# Patient Record
Sex: MALE | Race: WHITE | Employment: PART TIME | URBAN - METROPOLITAN AREA
[De-identification: names, ages, dates, MRNs, and addresses within clinical notes are randomized per-mention and may not be internally consistent; named-entity substitution may affect disease eponyms.]

---

## 2020-01-01 ENCOUNTER — APPOINTMENT (OUTPATIENT)
Dept: GENERAL RADIOLOGY | Age: 75
DRG: 951 | End: 2020-01-01
Attending: EMERGENCY MEDICINE
Payer: COMMERCIAL

## 2020-01-01 ENCOUNTER — HOSPITAL ENCOUNTER (EMERGENCY)
Age: 75
Discharge: HOME | DRG: 951 | End: 2020-12-30
Attending: EMERGENCY MEDICINE
Payer: COMMERCIAL

## 2020-01-01 VITALS
HEART RATE: 78 BPM | OXYGEN SATURATION: 95 % | HEIGHT: 71 IN | DIASTOLIC BLOOD PRESSURE: 70 MMHG | TEMPERATURE: 98.8 F | SYSTOLIC BLOOD PRESSURE: 109 MMHG | RESPIRATION RATE: 20 BRPM | BODY MASS INDEX: 29.82 KG/M2 | WEIGHT: 213 LBS

## 2020-01-01 DIAGNOSIS — E87.1 HYPONATREMIA: ICD-10-CM

## 2020-01-01 DIAGNOSIS — J12.82 PNEUMONIA DUE TO COVID-19 VIRUS: ICD-10-CM

## 2020-01-01 DIAGNOSIS — U07.1 PNEUMONIA DUE TO COVID-19 VIRUS: ICD-10-CM

## 2020-01-01 DIAGNOSIS — U07.1 COVID-19 VIRUS INFECTION: Primary | ICD-10-CM

## 2020-01-01 LAB
ALBUMIN SERPL-MCNC: 3.8 G/DL (ref 3.2–4.6)
ALBUMIN/GLOB SERPL: 0.9 {RATIO} (ref 1.2–3.5)
ALP SERPL-CCNC: 98 U/L (ref 50–136)
ALT SERPL-CCNC: 55 U/L (ref 12–65)
ANION GAP SERPL CALC-SCNC: 3 MMOL/L (ref 7–16)
AST SERPL-CCNC: 130 U/L (ref 15–37)
ATRIAL RATE: 92 BPM
BASOPHILS # BLD: 0 K/UL (ref 0–0.2)
BASOPHILS NFR BLD: 0 % (ref 0–2)
BILIRUB SERPL-MCNC: 0.6 MG/DL (ref 0.2–1.1)
BUN SERPL-MCNC: 22 MG/DL (ref 8–23)
CALCIUM SERPL-MCNC: 8.9 MG/DL (ref 8.3–10.4)
CALCULATED P AXIS, ECG09: 23 DEGREES
CALCULATED R AXIS, ECG10: -3 DEGREES
CALCULATED T AXIS, ECG11: 15 DEGREES
CHLORIDE SERPL-SCNC: 104 MMOL/L (ref 98–107)
CO2 SERPL-SCNC: 22 MMOL/L (ref 21–32)
COVID-19 RAPID TEST, COVR: DETECTED
CREAT SERPL-MCNC: 1.7 MG/DL (ref 0.8–1.5)
DIAGNOSIS, 93000: NORMAL
DIFFERENTIAL METHOD BLD: ABNORMAL
EOSINOPHIL # BLD: 0 K/UL (ref 0–0.8)
EOSINOPHIL NFR BLD: 0 % (ref 0.5–7.8)
ERYTHROCYTE [DISTWIDTH] IN BLOOD BY AUTOMATED COUNT: 13.1 % (ref 11.9–14.6)
GLOBULIN SER CALC-MCNC: 4.1 G/DL (ref 2.3–3.5)
GLUCOSE SERPL-MCNC: 151 MG/DL (ref 65–100)
HCT VFR BLD AUTO: 39.7 % (ref 41.1–50.3)
HGB BLD-MCNC: 13.5 G/DL (ref 13.6–17.2)
IMM GRANULOCYTES # BLD AUTO: 0 K/UL (ref 0–0.5)
IMM GRANULOCYTES NFR BLD AUTO: 0 % (ref 0–5)
LACTATE SERPL-SCNC: 1.1 MMOL/L (ref 0.4–2)
LYMPHOCYTES # BLD: 1 K/UL (ref 0.5–4.6)
LYMPHOCYTES NFR BLD: 22 % (ref 13–44)
MCH RBC QN AUTO: 32.5 PG (ref 26.1–32.9)
MCHC RBC AUTO-ENTMCNC: 34 G/DL (ref 31.4–35)
MCV RBC AUTO: 95.4 FL (ref 79.6–97.8)
MONOCYTES # BLD: 0.3 K/UL (ref 0.1–1.3)
MONOCYTES NFR BLD: 7 % (ref 4–12)
NEUTS SEG # BLD: 3.2 K/UL (ref 1.7–8.2)
NEUTS SEG NFR BLD: 71 % (ref 43–78)
NRBC # BLD: 0 K/UL (ref 0–0.2)
P-R INTERVAL, ECG05: 136 MS
PLATELET # BLD AUTO: 136 K/UL (ref 150–450)
PLATELET COMMENTS,PCOM: ADEQUATE
PMV BLD AUTO: 9.7 FL (ref 9.4–12.3)
POTASSIUM SERPL-SCNC: 4.1 MMOL/L (ref 3.5–5.1)
PROCALCITONIN SERPL-MCNC: 0.07 NG/ML
PROT SERPL-MCNC: 7.9 G/DL (ref 6.3–8.2)
Q-T INTERVAL, ECG07: 332 MS
QRS DURATION, ECG06: 100 MS
QTC CALCULATION (BEZET), ECG08: 410 MS
RBC # BLD AUTO: 4.16 M/UL (ref 4.23–5.6)
RBC MORPH BLD: ABNORMAL
SODIUM SERPL-SCNC: 129 MMOL/L (ref 136–145)
SOURCE, COVRS: ABNORMAL
TROPONIN-HIGH SENSITIVITY: 19.1 PG/ML (ref 0–14)
VENTRICULAR RATE, ECG03: 92 BPM
WBC # BLD AUTO: 4.5 K/UL (ref 4.3–11.1)
WBC MORPH BLD: ABNORMAL

## 2020-01-01 PROCEDURE — 74011250636 HC RX REV CODE- 250/636: Performed by: EMERGENCY MEDICINE

## 2020-01-01 PROCEDURE — 87040 BLOOD CULTURE FOR BACTERIA: CPT

## 2020-01-01 PROCEDURE — 74011250637 HC RX REV CODE- 250/637: Performed by: EMERGENCY MEDICINE

## 2020-01-01 PROCEDURE — 84484 ASSAY OF TROPONIN QUANT: CPT

## 2020-01-01 PROCEDURE — 99284 EMERGENCY DEPT VISIT MOD MDM: CPT

## 2020-01-01 PROCEDURE — 80053 COMPREHEN METABOLIC PANEL: CPT

## 2020-01-01 PROCEDURE — 71045 X-RAY EXAM CHEST 1 VIEW: CPT

## 2020-01-01 PROCEDURE — 93005 ELECTROCARDIOGRAM TRACING: CPT | Performed by: EMERGENCY MEDICINE

## 2020-01-01 PROCEDURE — 83605 ASSAY OF LACTIC ACID: CPT

## 2020-01-01 PROCEDURE — 84145 PROCALCITONIN (PCT): CPT

## 2020-01-01 PROCEDURE — 85025 COMPLETE CBC W/AUTO DIFF WBC: CPT

## 2020-01-01 PROCEDURE — 87635 SARS-COV-2 COVID-19 AMP PRB: CPT

## 2020-01-01 RX ORDER — DOXYCYCLINE HYCLATE 100 MG
100 TABLET ORAL 2 TIMES DAILY
Qty: 14 TAB | Refills: 0 | Status: SHIPPED | OUTPATIENT
Start: 2020-01-01 | End: 2021-01-01

## 2020-01-01 RX ORDER — ACETAMINOPHEN 500 MG
1000 TABLET ORAL
Status: COMPLETED | OUTPATIENT
Start: 2020-01-01 | End: 2020-01-01

## 2020-01-01 RX ADMIN — ACETAMINOPHEN 1000 MG: 500 TABLET, FILM COATED ORAL at 01:19

## 2020-01-01 RX ADMIN — SODIUM CHLORIDE 500 ML: 900 INJECTION, SOLUTION INTRAVENOUS at 01:19

## 2020-04-27 PROBLEM — R07.89 OTHER CHEST PAIN: Status: ACTIVE | Noted: 2020-01-01

## 2020-04-27 PROBLEM — R07.2 PRECORDIAL PAIN: Status: ACTIVE | Noted: 2020-01-01

## 2020-12-30 NOTE — DISCHARGE INSTRUCTIONS
Take antibiotic as prescribed. You will need to follow-up with primary care physician in 48 hours for sodium recheck to ensure not worsening. Your sodium level was 129. Self quarantine/self isolate at home per CDC/DHEC guidelines. Schedule follow-up with primary care physician in 48 hrs. Return to emergency department if symptoms worsen or progress in any way. Take Tylenol and/or Ibuprofen as directed if you develop fever. Drink plenty of fluids (water) to remain hydrated. You should be contacted in 72 hours with results to COVID-19 testing. Obtain your own personal pulse oximeter to check oxygen saturation. If oxygen saturations <88-90%, please return immediately to Emergency Department.

## 2020-12-30 NOTE — ED PROVIDER NOTES
74-year-old male with history of CAD, diabetes presents via EMS from home with complaint of increased generalized diffuse myalgias, fatigue, nonproductive cough, fever, chills, URI symptoms since Sunday 12/28. States that he visited [facility] for [place] with family on the way home from his visit he began to have chills and body aches during the car ride home. Patient reports shortness of breath and \"diffuse burning feeling in his lungs when he breathes in\". Denies chest pain, abdominal pain, nausea, vomiting, diarrhea, sore throat, lost sense of taste or smell. Patient states that he last took Aleve around noon. Patient states that he was tested for COVID-19 around 2 weeks ago and was tested negative. 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 Denies history of tobacco use. The history is provided by the patient. No  was used. Shortness of Breath This is a new problem. The problem occurs continuously. The current episode started more than 2 days ago. The problem has not changed since onset. Associated symptoms include a fever, headaches, rhinorrhea and cough. Pertinent negatives include no coryza, no sore throat, no swollen glands, no ear pain, no neck pain, no hemoptysis, no wheezing, no PND, no orthopnea, no chest pain, no syncope, no vomiting, no abdominal pain, no rash, no leg pain and no leg swelling. He has tried nothing for the symptoms. The treatment provided no relief. Past Medical History:  
Diagnosis Date  Arthritis  CAD (coronary artery disease) CABG 1992 - no heart cath since - has not seen a cardiologist since 2008  Diabetes ([facility] [address] type 2, adverage glucose , symptomatic is unknown, unknown A1C level  Full dentures  GERD (gastroesophageal reflux disease)   
 controlled with rolaids PRN  
 Hepatitis C   
 2005  High cholesterol  Hypertension Past Surgical History:  
Procedure Laterality Date  CARDIAC SURG PROCEDURE UNLIST    
 triple bypass  HX COLONOSCOPY    
 HX KNEE ARTHROSCOPY Left   
 times 3   
 HX KNEE REPLACEMENT Bilateral   
 HX REFRACTIVE SURGERY Right Family History:  
Problem Relation Age of Onset  Diabetes Mother  Kidney Disease Mother   
     only has one kidney  Cancer Mother   
     liver  Alcohol abuse Father Social History Socioeconomic History  Marital status:  Spouse name: Not on file  Number of children: Not on file  Years of education: Not on file  Highest education level: Not on file Occupational History  Not on file Social Needs  Financial resource strain: Not on file  Food insecurity Worry: Not on file Inability: Not on file  Transportation needs Medical: Not on file Non-medical: Not on file Tobacco Use  Smoking status: Never Smoker  Smokeless tobacco: Never Used Substance and Sexual Activity  Alcohol use: Yes Alcohol/week: 4.0 standard drinks Types: 4 Cans of beer per week  Drug use: No  
 Sexual activity: Not on file Lifestyle  Physical activity Days per week: Not on file Minutes per session: Not on file  Stress: Not on file Relationships  Social connections Talks on phone: Not on file Gets together: Not on file Attends Quaker service: Not on file Active member of club or organization: Not on file Attends meetings of clubs or organizations: Not on file Relationship status: Not on file  Intimate partner violence Fear of current or ex partner: Not on file Emotionally abused: Not on file Physically abused: Not on file Forced sexual activity: Not on file Other Topics Concern  Not on file Social History Narrative  Not on file ALLERGIES: Codeine Review of Systems Constitutional: Positive for chills, fatigue and fever. HENT: Positive for congestion and rhinorrhea. Negative for ear pain, sore throat, trouble swallowing and voice change. Respiratory: Positive for cough and shortness of breath. Negative for hemoptysis and wheezing. Cardiovascular: Negative for chest pain, orthopnea, leg swelling, syncope and PND. Gastrointestinal: Negative for abdominal pain, diarrhea, nausea and vomiting. Genitourinary: Negative for dysuria and flank pain. Musculoskeletal: Positive for myalgias. Negative for gait problem and neck pain. Skin: Negative for rash. Neurological: Positive for headaches. Negative for dizziness, syncope, weakness and light-headedness. Psychiatric/Behavioral: Negative for confusion. Vitals:  
 12/29/20 2234 BP: 129/76 Pulse: 90 Resp: 20 Temp: (!) 102.7 °F (39.3 °C) SpO2: 95% Weight: 96.6 kg (213 lb) Height: 5' 10.5\" (1.791 m) Physical Exam 
Vitals signs and nursing note reviewed. Constitutional:   
   Appearance: He is well-developed. Comments: Nontoxic in appearance. HENT:  
   Head: Normocephalic. Mouth/Throat:  
   Mouth: Mucous membranes are moist.  
   Comments: Uvula midline. No tonsillar erythema or exudate noted. Eyes:  
   Extraocular Movements: Extraocular movements intact. Pupils: Pupils are equal, round, and reactive to light. Neck: Musculoskeletal: Normal range of motion. Comments: No nuchal rigidity. Cardiovascular:  
   Rate and Rhythm: Normal rate and regular rhythm. Pulses: Normal pulses. Heart sounds: Normal heart sounds. Pulmonary:  
   Effort: Pulmonary effort is normal.  
   Breath sounds: Normal breath sounds. Comments: CTAB. Abdominal:  
   General: Bowel sounds are normal.  
   Palpations: Abdomen is soft. Tenderness: There is no abdominal tenderness. Comments: Soft, nontender. Musculoskeletal: Normal range of motion. Right lower leg: No edema. Left lower leg: No edema.   
Skin: 
 General: Skin is warm. Findings: No erythema. Neurological:  
   General: No focal deficit present. Mental Status: He is alert and oriented to person, place, and time. Motor: No weakness. Comments: No focal deficits. No meningeal signs. MDM Number of Diagnoses or Management Options COVID-19 virus infection: new and requires workup Hyponatremia: new and requires workup Pneumonia due to COVID-19 virus: new and requires workup Diagnosis management comments: Labs unremarkable with exception of a slight hyponatremia with sodium of 129. Chest x-ray with hazy bilateral lung opacities. COVID-19 positive. Patient well-appearing in no acute distress. Ambulated in place for greater than 3 minutes with no significant desat. O2 sats remained around 95%. Patient given small fluid bolus and Tylenol. No indication for admission the patient at this time. Will discharge home with instructions to follow-up with PCP in 48 hours. Patient given personal pulse oximeter. Patient given strict return precautions. Amount and/or Complexity of Data Reviewed Clinical lab tests: ordered and reviewed Tests in the radiology section of CPT®: ordered and reviewed Tests in the medicine section of CPT®: ordered and reviewed Review and summarize past medical records: yes Independent visualization of images, tracings, or specimens: yes Risk of Complications, Morbidity, and/or Mortality Presenting problems: moderate Diagnostic procedures: low Management options: moderate Patient Progress Patient progress: stable ED Course as of Dec 30 0315 Wed Dec 30, 2020  
0059 CXR IMPRESSION: Hazy bilateral lung opacities, possibly early pneumonia. Covid 19 
pneumonia is a differential consideration. [DF] 0225 Patient ambulated in room for greater than 3 minutes with no desaturation. O2 sats remained greater than 95%. [DF] ED Course User Index 
[DF] Ashu Bailey MD  
 EKG 
 
Date/Time: 12/30/2020 1:12 AM 
Performed by: Ashu Bailey MD 
Authorized by: Ashu Bailey MD  
 
ECG reviewed by ED Physician in the absence of a cardiologist: yes Rate:  
  ECG rate:  92 ECG rate assessment: normal   
Rhythm:  
  Rhythm: sinus rhythm Ectopy:  
  Ectopy: none QRS:  
  QRS axis:  Normal 
  QRS intervals:  Normal 
Conduction:  
  Conduction: normal   
ST segments: ST segments:  Normal 
T waves:  
  T waves: normal   
 
 
 
Results Include: 
 
Recent Results (from the past 24 hour(s)) CBC WITH AUTOMATED DIFF Collection Time: 12/29/20 10:42 PM  
Result Value Ref Range WBC 4.5 4.3 - 11.1 K/uL  
 RBC 4.16 (L) 4.23 - 5.6 M/uL  
 HGB 13.5 (L) 13.6 - 17.2 g/dL HCT 39.7 (L) 41.1 - 50.3 % MCV 95.4 79.6 - 97.8 FL  
 MCH 32.5 26.1 - 32.9 PG  
 MCHC 34.0 31.4 - 35.0 g/dL  
 RDW 13.1 11.9 - 14.6 % PLATELET 856 (L) 660 - 450 K/uL MPV 9.7 9.4 - 12.3 FL ABSOLUTE NRBC 0.00 0.0 - 0.2 K/uL DF PENDING   
METABOLIC PANEL, COMPREHENSIVE Collection Time: 12/29/20 10:42 PM  
Result Value Ref Range Sodium 129 (L) 136 - 145 mmol/L Potassium 4.1 3.5 - 5.1 mmol/L Chloride 104 98 - 107 mmol/L  
 CO2 22 21 - 32 mmol/L Anion gap 3 (L) 7 - 16 mmol/L Glucose 151 (H) 65 - 100 mg/dL BUN 22 8 - 23 MG/DL Creatinine 1.70 (H) 0.8 - 1.5 MG/DL  
 GFR est AA 51 (L) >60 ml/min/1.73m2 GFR est non-AA 42 (L) >60 ml/min/1.73m2 Calcium 8.9 8.3 - 10.4 MG/DL Bilirubin, total 0.6 0.2 - 1.1 MG/DL  
 ALT (SGPT) 55 12 - 65 U/L  
 AST (SGOT) 130 (H) 15 - 37 U/L Alk. phosphatase 98 50 - 136 U/L Protein, total 7.9 6.3 - 8.2 g/dL Albumin 3.8 3.2 - 4.6 g/dL Globulin 4.1 (H) 2.3 - 3.5 g/dL A-G Ratio 0.9 (L) 1.2 - 3.5 LACTIC ACID Collection Time: 12/29/20 10:42 PM  
Result Value Ref Range  Lactic acid 1.1 0.4 - 2.0 MMOL/L  
 
 
 
 
 
 Wan Ford MD; 12/30/2020 @1:12 AM Voice dictation software was used during the making of this note. This software is not perfect and grammatical and other typographical errors may be present.   This note has not been proofread for errors. 
===================================================================

## 2020-12-30 NOTE — ED TRIAGE NOTES
Pt arrives via [facility] from home. Pt is wearing a mask. Pt c/o generalized body aches, fatigue, cough, fever, and dizziness since Sunday 12/28/20. Pt reports he got a temperature of 101 at home. Pt reports SOB and reports a \"burning feeling when I breathe in.\" Pt reports \"extreme HA\" and diarrhea. Pt denies CP, abdominal pain, n/v, sore throat, or loss of taste/smell. Pt reports he last took a 12-hour Aleve around noon today. Pt denies any known COVID contacts. Pt reports getting together with family on Sunday. EMS started 18 g IV in left hand. Pt has Hx of CABG and Type 2 DM. 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

## 2021-01-01 ENCOUNTER — APPOINTMENT (OUTPATIENT)
Dept: CT IMAGING | Age: 76
DRG: 951 | End: 2021-01-01
Attending: HOSPITALIST
Payer: COMMERCIAL

## 2021-01-01 ENCOUNTER — APPOINTMENT (OUTPATIENT)
Dept: GENERAL RADIOLOGY | Age: 76
DRG: 951 | End: 2021-01-01
Attending: FAMILY MEDICINE
Payer: COMMERCIAL

## 2021-01-01 ENCOUNTER — APPOINTMENT (OUTPATIENT)
Dept: GENERAL RADIOLOGY | Age: 76
DRG: 951 | End: 2021-01-01
Attending: INTERNAL MEDICINE
Payer: COMMERCIAL

## 2021-01-01 ENCOUNTER — HOSPITAL ENCOUNTER (INPATIENT)
Age: 76
LOS: 12 days | DRG: 951 | End: 2021-01-13
Attending: EMERGENCY MEDICINE | Admitting: FAMILY MEDICINE
Payer: COMMERCIAL

## 2021-01-01 ENCOUNTER — APPOINTMENT (OUTPATIENT)
Dept: GENERAL RADIOLOGY | Age: 76
DRG: 951 | End: 2021-01-01
Attending: EMERGENCY MEDICINE
Payer: COMMERCIAL

## 2021-01-01 VITALS
DIASTOLIC BLOOD PRESSURE: 75 MMHG | OXYGEN SATURATION: 27 % | HEIGHT: 70 IN | TEMPERATURE: 98 F | RESPIRATION RATE: 110 BRPM | WEIGHT: 141.54 LBS | BODY MASS INDEX: 20.26 KG/M2 | SYSTOLIC BLOOD PRESSURE: 145 MMHG

## 2021-01-01 DIAGNOSIS — E11.65 TYPE 2 DIABETES MELLITUS WITH HYPERGLYCEMIA, WITH LONG-TERM CURRENT USE OF INSULIN (HCC): ICD-10-CM

## 2021-01-01 DIAGNOSIS — J12.82 PNEUMONIA DUE TO COVID-19 VIRUS: Primary | ICD-10-CM

## 2021-01-01 DIAGNOSIS — Z79.4 TYPE 2 DIABETES MELLITUS WITH HYPERGLYCEMIA, WITH LONG-TERM CURRENT USE OF INSULIN (HCC): ICD-10-CM

## 2021-01-01 DIAGNOSIS — I95.9 HYPOTENSION, UNSPECIFIED HYPOTENSION TYPE: ICD-10-CM

## 2021-01-01 DIAGNOSIS — I10 ESSENTIAL HYPERTENSION: ICD-10-CM

## 2021-01-01 DIAGNOSIS — E55.9 VITAMIN D DEFICIENCY: ICD-10-CM

## 2021-01-01 DIAGNOSIS — J96.01 ACUTE RESPIRATORY FAILURE WITH HYPOXIA (HCC): ICD-10-CM

## 2021-01-01 DIAGNOSIS — U07.1 PNEUMONIA DUE TO COVID-19 VIRUS: Primary | ICD-10-CM

## 2021-01-01 DIAGNOSIS — J93.9 PNEUMOTHORAX ON RIGHT: ICD-10-CM

## 2021-01-01 DIAGNOSIS — D72.829 LEUKOCYTOSIS, UNSPECIFIED TYPE: ICD-10-CM

## 2021-01-01 DIAGNOSIS — R09.02 HYPOXIA: ICD-10-CM

## 2021-01-01 DIAGNOSIS — N18.31 STAGE 3A CHRONIC KIDNEY DISEASE (HCC): ICD-10-CM

## 2021-01-01 PROBLEM — E78.2 MIXED HYPERLIPIDEMIA: Status: ACTIVE | Noted: 2021-01-01

## 2021-01-01 PROBLEM — E11.9 TYPE 2 DIABETES MELLITUS (HCC): Status: ACTIVE | Noted: 2018-07-23

## 2021-01-01 PROBLEM — I25.119 CORONARY ARTERY DISEASE INVOLVING NATIVE CORONARY ARTERY OF NATIVE HEART WITH ANGINA PECTORIS (HCC): Status: ACTIVE | Noted: 2020-01-01

## 2021-01-01 PROBLEM — K21.9 GASTROESOPHAGEAL REFLUX DISEASE: Status: ACTIVE | Noted: 2021-01-01

## 2021-01-01 LAB
ABO + RH BLD: NORMAL
ADMINISTERED INITIALS, ADMINIT: NORMAL
ALBUMIN SERPL-MCNC: 2.4 G/DL (ref 3.2–4.6)
ALBUMIN SERPL-MCNC: 2.6 G/DL (ref 3.2–4.6)
ALBUMIN SERPL-MCNC: 2.7 G/DL (ref 3.2–4.6)
ALBUMIN SERPL-MCNC: 2.9 G/DL (ref 3.2–4.6)
ALBUMIN SERPL-MCNC: 3.1 G/DL (ref 3.2–4.6)
ALBUMIN/GLOB SERPL: 0.6 {RATIO} (ref 1.2–3.5)
ALBUMIN/GLOB SERPL: 0.6 {RATIO} (ref 1.2–3.5)
ALBUMIN/GLOB SERPL: 0.7 {RATIO} (ref 1.2–3.5)
ALBUMIN/GLOB SERPL: 0.8 {RATIO} (ref 1.2–3.5)
ALP SERPL-CCNC: 125 U/L (ref 50–136)
ALP SERPL-CCNC: 80 U/L (ref 50–136)
ALP SERPL-CCNC: 81 U/L (ref 50–136)
ALP SERPL-CCNC: 83 U/L (ref 50–136)
ALP SERPL-CCNC: 85 U/L (ref 50–136)
ALP SERPL-CCNC: 88 U/L (ref 50–136)
ALP SERPL-CCNC: 92 U/L (ref 50–136)
ALP SERPL-CCNC: 98 U/L (ref 50–136)
ALT SERPL-CCNC: 40 U/L (ref 12–65)
ALT SERPL-CCNC: 41 U/L (ref 12–65)
ALT SERPL-CCNC: 43 U/L (ref 12–65)
ALT SERPL-CCNC: 43 U/L (ref 12–65)
ALT SERPL-CCNC: 44 U/L (ref 12–65)
ALT SERPL-CCNC: 47 U/L (ref 12–65)
ANION GAP SERPL CALC-SCNC: 10 MMOL/L (ref 7–16)
ANION GAP SERPL CALC-SCNC: 11 MMOL/L (ref 7–16)
ANION GAP SERPL CALC-SCNC: 12 MMOL/L (ref 7–16)
ANION GAP SERPL CALC-SCNC: 12 MMOL/L (ref 7–16)
ANION GAP SERPL CALC-SCNC: 6 MMOL/L (ref 7–16)
ANION GAP SERPL CALC-SCNC: 7 MMOL/L (ref 7–16)
ANION GAP SERPL CALC-SCNC: 7 MMOL/L (ref 7–16)
ANION GAP SERPL CALC-SCNC: 8 MMOL/L (ref 7–16)
ANION GAP SERPL CALC-SCNC: 9 MMOL/L (ref 7–16)
ANION GAP SERPL CALC-SCNC: 9 MMOL/L (ref 7–16)
ARTERIAL PATENCY WRIST A: ABNORMAL
ARTERIAL PATENCY WRIST A: YES
AST SERPL-CCNC: 105 U/L (ref 15–37)
AST SERPL-CCNC: 108 U/L (ref 15–37)
AST SERPL-CCNC: 109 U/L (ref 15–37)
AST SERPL-CCNC: 111 U/L (ref 15–37)
AST SERPL-CCNC: 116 U/L (ref 15–37)
AST SERPL-CCNC: 116 U/L (ref 15–37)
AST SERPL-CCNC: 117 U/L (ref 15–37)
AST SERPL-CCNC: 120 U/L (ref 15–37)
ATRIAL RATE: 79 BPM
ATRIAL RATE: 88 BPM
BACTERIA SPEC CULT: NORMAL
BASE DEFICIT BLD-SCNC: 11 MMOL/L
BASE DEFICIT BLD-SCNC: 3 MMOL/L
BASE DEFICIT BLD-SCNC: 4 MMOL/L
BASE DEFICIT BLD-SCNC: 4 MMOL/L
BASE DEFICIT BLD-SCNC: 5 MMOL/L
BASE DEFICIT BLD-SCNC: 7 MMOL/L
BASE DEFICIT BLD-SCNC: 7 MMOL/L
BASE EXCESS BLD CALC-SCNC: 1 MMOL/L
BASOPHILS # BLD: 0 K/UL (ref 0–0.2)
BASOPHILS # BLD: 0.1 K/UL (ref 0–0.2)
BASOPHILS NFR BLD: 0 % (ref 0–2)
BASOPHILS NFR BLD: 1 % (ref 0–2)
BDY SITE: ABNORMAL
BILIRUB SERPL-MCNC: 0.5 MG/DL (ref 0.2–1.1)
BILIRUB SERPL-MCNC: 0.6 MG/DL (ref 0.2–1.1)
BILIRUB SERPL-MCNC: 0.6 MG/DL (ref 0.2–1.1)
BILIRUB SERPL-MCNC: 0.7 MG/DL (ref 0.2–1.1)
BILIRUB SERPL-MCNC: 0.8 MG/DL (ref 0.2–1.1)
BILIRUB SERPL-MCNC: 0.8 MG/DL (ref 0.2–1.1)
BILIRUB SERPL-MCNC: 0.9 MG/DL (ref 0.2–1.1)
BILIRUB SERPL-MCNC: 0.9 MG/DL (ref 0.2–1.1)
BLD PROD TYP BPU: NORMAL
BLOOD BANK CMNT PATIENT-IMP: NORMAL
BLOOD GROUP ANTIBODIES SERPL: NORMAL
BNP SERPL-MCNC: 378 PG/ML (ref 5–125)
BPU ID: NORMAL
BUN SERPL-MCNC: 25 MG/DL (ref 8–23)
BUN SERPL-MCNC: 33 MG/DL (ref 8–23)
BUN SERPL-MCNC: 35 MG/DL (ref 8–23)
BUN SERPL-MCNC: 37 MG/DL (ref 8–23)
BUN SERPL-MCNC: 38 MG/DL (ref 8–23)
BUN SERPL-MCNC: 60 MG/DL (ref 8–23)
BUN SERPL-MCNC: 64 MG/DL (ref 8–23)
BUN SERPL-MCNC: 84 MG/DL (ref 8–23)
BUN SERPL-MCNC: 92 MG/DL (ref 8–23)
BUN SERPL-MCNC: 94 MG/DL (ref 8–23)
CALCIUM SERPL-MCNC: 10.1 MG/DL (ref 8.3–10.4)
CALCIUM SERPL-MCNC: 7.9 MG/DL (ref 8.3–10.4)
CALCIUM SERPL-MCNC: 8.2 MG/DL (ref 8.3–10.4)
CALCIUM SERPL-MCNC: 8.2 MG/DL (ref 8.3–10.4)
CALCIUM SERPL-MCNC: 8.3 MG/DL (ref 8.3–10.4)
CALCIUM SERPL-MCNC: 8.4 MG/DL (ref 8.3–10.4)
CALCIUM SERPL-MCNC: 8.6 MG/DL (ref 8.3–10.4)
CALCIUM SERPL-MCNC: 8.7 MG/DL (ref 8.3–10.4)
CALCIUM SERPL-MCNC: 8.7 MG/DL (ref 8.3–10.4)
CALCIUM SERPL-MCNC: 8.8 MG/DL (ref 8.3–10.4)
CALCIUM SERPL-MCNC: 9 MG/DL (ref 8.3–10.4)
CALCIUM SERPL-MCNC: 9.6 MG/DL (ref 8.3–10.4)
CALCULATED P AXIS, ECG09: 1 DEGREES
CALCULATED R AXIS, ECG10: -16 DEGREES
CALCULATED R AXIS, ECG10: -26 DEGREES
CALCULATED T AXIS, ECG11: -2 DEGREES
CALCULATED T AXIS, ECG11: -5 DEGREES
CHLORIDE SERPL-SCNC: 100 MMOL/L (ref 98–107)
CHLORIDE SERPL-SCNC: 100 MMOL/L (ref 98–107)
CHLORIDE SERPL-SCNC: 101 MMOL/L (ref 98–107)
CHLORIDE SERPL-SCNC: 104 MMOL/L (ref 98–107)
CHLORIDE SERPL-SCNC: 104 MMOL/L (ref 98–107)
CHLORIDE SERPL-SCNC: 105 MMOL/L (ref 98–107)
CHLORIDE SERPL-SCNC: 105 MMOL/L (ref 98–107)
CHLORIDE SERPL-SCNC: 106 MMOL/L (ref 98–107)
CHLORIDE SERPL-SCNC: 107 MMOL/L (ref 98–107)
CHLORIDE SERPL-SCNC: 107 MMOL/L (ref 98–107)
CHLORIDE SERPL-SCNC: 96 MMOL/L (ref 98–107)
CHLORIDE SERPL-SCNC: 96 MMOL/L (ref 98–107)
CO2 BLD-SCNC: 19 MMOL/L
CO2 BLD-SCNC: 21 MMOL/L
CO2 BLD-SCNC: 21 MMOL/L
CO2 BLD-SCNC: 24 MMOL/L
CO2 BLD-SCNC: 24 MMOL/L
CO2 BLD-SCNC: 25 MMOL/L
CO2 BLD-SCNC: 25 MMOL/L
CO2 BLD-SCNC: 26 MMOL/L
CO2 SERPL-SCNC: 19 MMOL/L (ref 21–32)
CO2 SERPL-SCNC: 20 MMOL/L (ref 21–32)
CO2 SERPL-SCNC: 21 MMOL/L (ref 21–32)
CO2 SERPL-SCNC: 21 MMOL/L (ref 21–32)
CO2 SERPL-SCNC: 22 MMOL/L (ref 21–32)
CO2 SERPL-SCNC: 23 MMOL/L (ref 21–32)
CO2 SERPL-SCNC: 23 MMOL/L (ref 21–32)
CO2 SERPL-SCNC: 24 MMOL/L (ref 21–32)
CO2 SERPL-SCNC: 27 MMOL/L (ref 21–32)
COLLECT TIME,HTIME: 1120
COLLECT TIME,HTIME: 1345
COLLECT TIME,HTIME: 1506
COLLECT TIME,HTIME: 1833
COLLECT TIME,HTIME: 2002
COLLECT TIME,HTIME: 230
COLLECT TIME,HTIME: 455
COLLECT TIME,HTIME: 635
CREAT SERPL-MCNC: 1.22 MG/DL (ref 0.8–1.5)
CREAT SERPL-MCNC: 1.27 MG/DL (ref 0.8–1.5)
CREAT SERPL-MCNC: 1.33 MG/DL (ref 0.8–1.5)
CREAT SERPL-MCNC: 1.4 MG/DL (ref 0.8–1.5)
CREAT SERPL-MCNC: 1.41 MG/DL (ref 0.8–1.5)
CREAT SERPL-MCNC: 1.51 MG/DL (ref 0.8–1.5)
CREAT SERPL-MCNC: 1.51 MG/DL (ref 0.8–1.5)
CREAT SERPL-MCNC: 1.53 MG/DL (ref 0.8–1.5)
CREAT SERPL-MCNC: 1.56 MG/DL (ref 0.8–1.5)
CREAT SERPL-MCNC: 2.6 MG/DL (ref 0.8–1.5)
CREAT SERPL-MCNC: 3.62 MG/DL (ref 0.8–1.5)
CREAT SERPL-MCNC: 3.9 MG/DL (ref 0.8–1.5)
CRP SERPL-MCNC: 10.5 MG/DL (ref 0–0.9)
D DIMER PPP FEU-MCNC: 0.4 UG/ML(FEU)
D DIMER PPP FEU-MCNC: 0.42 UG/ML(FEU)
D DIMER PPP FEU-MCNC: 0.5 UG/ML(FEU)
D DIMER PPP FEU-MCNC: 0.5 UG/ML(FEU)
D DIMER PPP FEU-MCNC: 0.51 UG/ML(FEU)
D DIMER PPP FEU-MCNC: 0.51 UG/ML(FEU)
D DIMER PPP FEU-MCNC: 0.52 UG/ML(FEU)
D DIMER PPP FEU-MCNC: 0.62 UG/ML(FEU)
D DIMER PPP FEU-MCNC: 0.73 UG/ML(FEU)
D DIMER PPP FEU-MCNC: 0.74 UG/ML(FEU)
D DIMER PPP FEU-MCNC: 0.76 UG/ML(FEU)
D50 ADMINISTERED, D50ADM: 0 ML
D50 ORDER, D50ORD: 0 ML
DIAGNOSIS, 93000: NORMAL
DIAGNOSIS, 93000: NORMAL
DIFFERENTIAL METHOD BLD: ABNORMAL
EOSINOPHIL # BLD: 0 K/UL (ref 0–0.8)
EOSINOPHIL NFR BLD: 0 % (ref 0.5–7.8)
ERYTHROCYTE [DISTWIDTH] IN BLOOD BY AUTOMATED COUNT: 12.8 % (ref 11.9–14.6)
ERYTHROCYTE [DISTWIDTH] IN BLOOD BY AUTOMATED COUNT: 13.1 % (ref 11.9–14.6)
ERYTHROCYTE [DISTWIDTH] IN BLOOD BY AUTOMATED COUNT: 13.2 % (ref 11.9–14.6)
EST. AVERAGE GLUCOSE BLD GHB EST-MCNC: 235 MG/DL
EST. AVERAGE GLUCOSE BLD GHB EST-MCNC: 240 MG/DL
EXHALED MINUTE VOLUME, VE: 11.7 L/MIN
EXHALED MINUTE VOLUME, VE: 12.6 L/MIN
EXHALED MINUTE VOLUME, VE: 15 L/MIN
EXHALED MINUTE VOLUME, VE: 20 L/MIN
FERRITIN SERPL-MCNC: 664 NG/ML (ref 8–388)
FIBRINOGEN PPP-MCNC: 555 MG/DL (ref 190–501)
FIBRINOGEN PPP-MCNC: 608 MG/DL (ref 190–501)
FIBRINOGEN PPP-MCNC: 621 MG/DL (ref 190–501)
FIBRINOGEN PPP-MCNC: 642 MG/DL (ref 190–501)
FIBRINOGEN PPP-MCNC: 644 MG/DL (ref 190–501)
FIBRINOGEN PPP-MCNC: 652 MG/DL (ref 190–501)
FIBRINOGEN PPP-MCNC: 652 MG/DL (ref 190–501)
FIBRINOGEN PPP-MCNC: 716 MG/DL (ref 190–501)
FIBRINOGEN PPP-MCNC: 757 MG/DL (ref 190–501)
FIBRINOGEN PPP-MCNC: 796 MG/DL (ref 190–501)
FIBRINOGEN PPP-MCNC: 828 MG/DL (ref 190–501)
FLOW RATE ISTAT,IFRATE: 60 L/MIN
GAS FLOW.O2 O2 DELIVERY SYS: ABNORMAL L/MIN
GAS FLOW.O2 SETTING OXYMISER: 28 BPM
GAS FLOW.O2 SETTING OXYMISER: 28 BPM
GAS FLOW.O2 SETTING OXYMISER: 32 BPM
GAS FLOW.O2 SETTING OXYMISER: 32 BPM
GLOBULIN SER CALC-MCNC: 3.7 G/DL (ref 2.3–3.5)
GLOBULIN SER CALC-MCNC: 3.8 G/DL (ref 2.3–3.5)
GLOBULIN SER CALC-MCNC: 3.9 G/DL (ref 2.3–3.5)
GLOBULIN SER CALC-MCNC: 4 G/DL (ref 2.3–3.5)
GLOBULIN SER CALC-MCNC: 4 G/DL (ref 2.3–3.5)
GLOBULIN SER CALC-MCNC: 4.2 G/DL (ref 2.3–3.5)
GLOBULIN SER CALC-MCNC: 4.2 G/DL (ref 2.3–3.5)
GLOBULIN SER CALC-MCNC: 4.9 G/DL (ref 2.3–3.5)
GLSCOM COMMENTS: NORMAL
GLUCOSE BLD STRIP.AUTO-MCNC: 129 MG/DL (ref 65–100)
GLUCOSE BLD STRIP.AUTO-MCNC: 139 MG/DL (ref 65–100)
GLUCOSE BLD STRIP.AUTO-MCNC: 146 MG/DL (ref 65–100)
GLUCOSE BLD STRIP.AUTO-MCNC: 146 MG/DL (ref 65–100)
GLUCOSE BLD STRIP.AUTO-MCNC: 179 MG/DL (ref 65–100)
GLUCOSE BLD STRIP.AUTO-MCNC: 182 MG/DL (ref 65–100)
GLUCOSE BLD STRIP.AUTO-MCNC: 189 MG/DL (ref 65–100)
GLUCOSE BLD STRIP.AUTO-MCNC: 195 MG/DL (ref 65–100)
GLUCOSE BLD STRIP.AUTO-MCNC: 202 MG/DL (ref 65–100)
GLUCOSE BLD STRIP.AUTO-MCNC: 205 MG/DL (ref 65–100)
GLUCOSE BLD STRIP.AUTO-MCNC: 209 MG/DL (ref 65–100)
GLUCOSE BLD STRIP.AUTO-MCNC: 210 MG/DL (ref 65–100)
GLUCOSE BLD STRIP.AUTO-MCNC: 219 MG/DL (ref 65–100)
GLUCOSE BLD STRIP.AUTO-MCNC: 224 MG/DL (ref 65–100)
GLUCOSE BLD STRIP.AUTO-MCNC: 226 MG/DL (ref 65–100)
GLUCOSE BLD STRIP.AUTO-MCNC: 231 MG/DL (ref 65–100)
GLUCOSE BLD STRIP.AUTO-MCNC: 232 MG/DL (ref 65–100)
GLUCOSE BLD STRIP.AUTO-MCNC: 235 MG/DL (ref 65–100)
GLUCOSE BLD STRIP.AUTO-MCNC: 243 MG/DL (ref 65–100)
GLUCOSE BLD STRIP.AUTO-MCNC: 255 MG/DL (ref 65–100)
GLUCOSE BLD STRIP.AUTO-MCNC: 256 MG/DL (ref 65–100)
GLUCOSE BLD STRIP.AUTO-MCNC: 265 MG/DL (ref 65–100)
GLUCOSE BLD STRIP.AUTO-MCNC: 274 MG/DL (ref 65–100)
GLUCOSE BLD STRIP.AUTO-MCNC: 277 MG/DL (ref 65–100)
GLUCOSE BLD STRIP.AUTO-MCNC: 279 MG/DL (ref 65–100)
GLUCOSE BLD STRIP.AUTO-MCNC: 297 MG/DL (ref 65–100)
GLUCOSE BLD STRIP.AUTO-MCNC: 299 MG/DL (ref 65–100)
GLUCOSE BLD STRIP.AUTO-MCNC: 300 MG/DL (ref 65–100)
GLUCOSE BLD STRIP.AUTO-MCNC: 314 MG/DL (ref 65–100)
GLUCOSE BLD STRIP.AUTO-MCNC: 318 MG/DL (ref 65–100)
GLUCOSE BLD STRIP.AUTO-MCNC: 320 MG/DL (ref 65–100)
GLUCOSE BLD STRIP.AUTO-MCNC: 328 MG/DL (ref 65–100)
GLUCOSE BLD STRIP.AUTO-MCNC: 334 MG/DL (ref 65–100)
GLUCOSE BLD STRIP.AUTO-MCNC: 345 MG/DL (ref 65–100)
GLUCOSE BLD STRIP.AUTO-MCNC: 346 MG/DL (ref 65–100)
GLUCOSE BLD STRIP.AUTO-MCNC: 346 MG/DL (ref 65–100)
GLUCOSE BLD STRIP.AUTO-MCNC: 351 MG/DL (ref 65–100)
GLUCOSE BLD STRIP.AUTO-MCNC: 356 MG/DL (ref 65–100)
GLUCOSE BLD STRIP.AUTO-MCNC: 364 MG/DL (ref 65–100)
GLUCOSE BLD STRIP.AUTO-MCNC: 364 MG/DL (ref 65–100)
GLUCOSE BLD STRIP.AUTO-MCNC: 385 MG/DL (ref 65–100)
GLUCOSE BLD STRIP.AUTO-MCNC: 393 MG/DL (ref 65–100)
GLUCOSE BLD STRIP.AUTO-MCNC: 396 MG/DL (ref 65–100)
GLUCOSE BLD STRIP.AUTO-MCNC: 398 MG/DL (ref 65–100)
GLUCOSE BLD STRIP.AUTO-MCNC: 408 MG/DL (ref 65–100)
GLUCOSE BLD STRIP.AUTO-MCNC: 437 MG/DL (ref 65–100)
GLUCOSE BLD STRIP.AUTO-MCNC: 451 MG/DL (ref 65–100)
GLUCOSE BLD STRIP.AUTO-MCNC: 478 MG/DL (ref 65–100)
GLUCOSE BLD STRIP.AUTO-MCNC: 514 MG/DL (ref 65–100)
GLUCOSE BLD STRIP.AUTO-MCNC: 516 MG/DL (ref 65–100)
GLUCOSE BLD STRIP.AUTO-MCNC: 84 MG/DL (ref 65–100)
GLUCOSE SERPL-MCNC: 218 MG/DL (ref 65–100)
GLUCOSE SERPL-MCNC: 235 MG/DL (ref 65–100)
GLUCOSE SERPL-MCNC: 238 MG/DL (ref 65–100)
GLUCOSE SERPL-MCNC: 241 MG/DL (ref 65–100)
GLUCOSE SERPL-MCNC: 263 MG/DL (ref 65–100)
GLUCOSE SERPL-MCNC: 280 MG/DL (ref 65–100)
GLUCOSE SERPL-MCNC: 281 MG/DL (ref 65–100)
GLUCOSE SERPL-MCNC: 299 MG/DL (ref 65–100)
GLUCOSE SERPL-MCNC: 308 MG/DL (ref 65–100)
GLUCOSE SERPL-MCNC: 345 MG/DL (ref 65–100)
GLUCOSE SERPL-MCNC: 450 MG/DL (ref 65–100)
GLUCOSE SERPL-MCNC: 531 MG/DL (ref 65–100)
GLUCOSE, GLC: 346 MG/DL
GLUCOSE, GLC: 437 MG/DL
GLUCOSE, GLC: 478 MG/DL
GLUCOSE, GLC: 514 MG/DL
GLUCOSE, GLC: 516 MG/DL
HBA1C MFR BLD: 10 % (ref 4.2–6.3)
HBA1C MFR BLD: 9.8 % (ref 4.8–6)
HCO3 BLD-SCNC: 18.1 MMOL/L (ref 22–26)
HCO3 BLD-SCNC: 19.5 MMOL/L (ref 22–26)
HCO3 BLD-SCNC: 20.2 MMOL/L (ref 22–26)
HCO3 BLD-SCNC: 21.8 MMOL/L (ref 22–26)
HCO3 BLD-SCNC: 21.9 MMOL/L (ref 22–26)
HCO3 BLD-SCNC: 23.5 MMOL/L (ref 22–26)
HCO3 BLD-SCNC: 24 MMOL/L (ref 22–26)
HCO3 BLD-SCNC: 24.3 MMOL/L (ref 22–26)
HCT VFR BLD AUTO: 32.5 % (ref 41.1–50.3)
HCT VFR BLD AUTO: 33.1 % (ref 41.1–50.3)
HCT VFR BLD AUTO: 33.3 % (ref 41.1–50.3)
HCT VFR BLD AUTO: 33.7 % (ref 41.1–50.3)
HCT VFR BLD AUTO: 34.2 % (ref 41.1–50.3)
HCT VFR BLD AUTO: 36 % (ref 41.1–50.3)
HCT VFR BLD AUTO: 38.7 % (ref 41.1–50.3)
HCT VFR BLD AUTO: 42.2 % (ref 41.1–50.3)
HCT VFR BLD AUTO: 42.4 % (ref 41.1–50.3)
HCT VFR BLD AUTO: 44.8 % (ref 41.1–50.3)
HCT VFR BLD AUTO: 46.2 % (ref 41.1–50.3)
HGB BLD-MCNC: 11.3 G/DL (ref 13.6–17.2)
HGB BLD-MCNC: 11.4 G/DL (ref 13.6–17.2)
HGB BLD-MCNC: 11.8 G/DL (ref 13.6–17.2)
HGB BLD-MCNC: 12.2 G/DL (ref 13.6–17.2)
HGB BLD-MCNC: 13.1 G/DL (ref 13.6–17.2)
HGB BLD-MCNC: 13.6 G/DL (ref 13.6–17.2)
HGB BLD-MCNC: 14.3 G/DL (ref 13.6–17.2)
HGB BLD-MCNC: 14.5 G/DL (ref 13.6–17.2)
HGB BLD-MCNC: 15.5 G/DL (ref 13.6–17.2)
HIGH TARGET, HITG: 180 MG/DL
IMM GRANULOCYTES # BLD AUTO: 0 K/UL (ref 0–0.5)
IMM GRANULOCYTES # BLD AUTO: 0 K/UL (ref 0–0.5)
IMM GRANULOCYTES # BLD AUTO: 0.1 K/UL (ref 0–0.5)
IMM GRANULOCYTES # BLD AUTO: 0.3 K/UL (ref 0–0.5)
IMM GRANULOCYTES # BLD AUTO: 0.3 K/UL (ref 0–0.5)
IMM GRANULOCYTES # BLD AUTO: 0.5 K/UL (ref 0–0.5)
IMM GRANULOCYTES # BLD AUTO: 0.9 K/UL (ref 0–0.5)
IMM GRANULOCYTES NFR BLD AUTO: 0 % (ref 0–5)
IMM GRANULOCYTES NFR BLD AUTO: 1 % (ref 0–5)
IMM GRANULOCYTES NFR BLD AUTO: 2 % (ref 0–5)
IMM GRANULOCYTES NFR BLD AUTO: 5 % (ref 0–5)
IMM GRANULOCYTES NFR BLD AUTO: 6 % (ref 0–5)
INR PPP: 1
INR PPP: 1.1
INR PPP: 1.2
INR PPP: 1.2
INSPIRATION.DURATION SETTING TIME VENT: 0.85 SEC
INSPIRATION.DURATION SETTING TIME VENT: 0.9 SEC
INSPIRATION.DURATION SETTING TIME VENT: 0.9 SEC
INSPIRATION.DURATION SETTING TIME VENT: 1 SEC
INSULIN ADMINSTERED, INSADM: 13.7 UNITS/HOUR
INSULIN ADMINSTERED, INSADM: 14.3 UNITS/HOUR
INSULIN ADMINSTERED, INSADM: 16.7 UNITS/HOUR
INSULIN ADMINSTERED, INSADM: 18.9 UNITS/HOUR
INSULIN ADMINSTERED, INSADM: 9.1 UNITS/HOUR
INSULIN ORDER, INSORD: 13.7 UNITS/HOUR
INSULIN ORDER, INSORD: 14.3 UNITS/HOUR
INSULIN ORDER, INSORD: 16.7 UNITS/HOUR
INSULIN ORDER, INSORD: 18.9 UNITS/HOUR
INSULIN ORDER, INSORD: 9.1 UNITS/HOUR
LACTATE SERPL-SCNC: 1.6 MMOL/L (ref 0.4–2)
LDH SERPL L TO P-CCNC: 286 U/L (ref 110–210)
LOW TARGET, LOT: 140 MG/DL
LYMPHOCYTES # BLD: 0.5 K/UL (ref 0.5–4.6)
LYMPHOCYTES # BLD: 0.8 K/UL (ref 0.5–4.6)
LYMPHOCYTES # BLD: 1 K/UL (ref 0.5–4.6)
LYMPHOCYTES # BLD: 1.2 K/UL (ref 0.5–4.6)
LYMPHOCYTES # BLD: 1.4 K/UL (ref 0.5–4.6)
LYMPHOCYTES NFR BLD: 11 % (ref 13–44)
LYMPHOCYTES NFR BLD: 17 % (ref 13–44)
LYMPHOCYTES NFR BLD: 18 % (ref 13–44)
LYMPHOCYTES NFR BLD: 3 % (ref 13–44)
LYMPHOCYTES NFR BLD: 7 % (ref 13–44)
LYMPHOCYTES NFR BLD: 8 % (ref 13–44)
LYMPHOCYTES NFR BLD: 9 % (ref 13–44)
MAGNESIUM SERPL-MCNC: 1.8 MG/DL (ref 1.8–2.4)
MAGNESIUM SERPL-MCNC: 2.8 MG/DL (ref 1.8–2.4)
MAGNESIUM SERPL-MCNC: 2.8 MG/DL (ref 1.8–2.4)
MCH RBC QN AUTO: 31.7 PG (ref 26.1–32.9)
MCH RBC QN AUTO: 31.7 PG (ref 26.1–32.9)
MCH RBC QN AUTO: 31.9 PG (ref 26.1–32.9)
MCH RBC QN AUTO: 32 PG (ref 26.1–32.9)
MCH RBC QN AUTO: 32 PG (ref 26.1–32.9)
MCH RBC QN AUTO: 32.1 PG (ref 26.1–32.9)
MCH RBC QN AUTO: 32.2 PG (ref 26.1–32.9)
MCH RBC QN AUTO: 32.3 PG (ref 26.1–32.9)
MCH RBC QN AUTO: 32.6 PG (ref 26.1–32.9)
MCH RBC QN AUTO: 32.7 PG (ref 26.1–32.9)
MCHC RBC AUTO-ENTMCNC: 32.1 G/DL (ref 31.4–35)
MCHC RBC AUTO-ENTMCNC: 33.5 G/DL (ref 31.4–35)
MCHC RBC AUTO-ENTMCNC: 33.5 G/DL (ref 31.4–35)
MCHC RBC AUTO-ENTMCNC: 33.9 G/DL (ref 31.4–35)
MCHC RBC AUTO-ENTMCNC: 34.4 G/DL (ref 31.4–35)
MCHC RBC AUTO-ENTMCNC: 34.5 G/DL (ref 31.4–35)
MCHC RBC AUTO-ENTMCNC: 34.8 G/DL (ref 31.4–35)
MCV RBC AUTO: 93.5 FL (ref 79.6–97.8)
MCV RBC AUTO: 93.9 FL (ref 79.6–97.8)
MCV RBC AUTO: 94.2 FL (ref 79.6–97.8)
MCV RBC AUTO: 94.5 FL (ref 79.6–97.8)
MCV RBC AUTO: 94.6 FL (ref 79.6–97.8)
MCV RBC AUTO: 94.6 FL (ref 79.6–97.8)
MCV RBC AUTO: 94.7 FL (ref 79.6–97.8)
MCV RBC AUTO: 95.3 FL (ref 79.6–97.8)
MCV RBC AUTO: 95.3 FL (ref 79.6–97.8)
MCV RBC AUTO: 98.8 FL (ref 79.6–97.8)
MINUTES UNTIL NEXT BG, NBG: 60 MIN
MONOCYTES # BLD: 0.3 K/UL (ref 0.1–1.3)
MONOCYTES # BLD: 0.4 K/UL (ref 0.1–1.3)
MONOCYTES # BLD: 0.6 K/UL (ref 0.1–1.3)
MONOCYTES # BLD: 0.6 K/UL (ref 0.1–1.3)
MONOCYTES # BLD: 0.7 K/UL (ref 0.1–1.3)
MONOCYTES # BLD: 0.9 K/UL (ref 0.1–1.3)
MONOCYTES # BLD: 1 K/UL (ref 0.1–1.3)
MONOCYTES NFR BLD: 5 % (ref 4–12)
MONOCYTES NFR BLD: 6 % (ref 4–12)
MONOCYTES NFR BLD: 6 % (ref 4–12)
MONOCYTES NFR BLD: 7 % (ref 4–12)
MONOCYTES NFR BLD: 8 % (ref 4–12)
MULTIPLIER, MUL: 0.02
MULTIPLIER, MUL: 0.03
MULTIPLIER, MUL: 0.04
MULTIPLIER, MUL: 0.05
MULTIPLIER, MUL: 0.05
NEUTS SEG # BLD: 10.9 K/UL (ref 1.7–8.2)
NEUTS SEG # BLD: 11.4 K/UL (ref 1.7–8.2)
NEUTS SEG # BLD: 18.6 K/UL (ref 1.7–8.2)
NEUTS SEG # BLD: 3.2 K/UL (ref 1.7–8.2)
NEUTS SEG # BLD: 3.7 K/UL (ref 1.7–8.2)
NEUTS SEG # BLD: 8.2 K/UL (ref 1.7–8.2)
NEUTS SEG # BLD: 9.7 K/UL (ref 1.7–8.2)
NEUTS SEG NFR BLD: 74 % (ref 43–78)
NEUTS SEG NFR BLD: 75 % (ref 43–78)
NEUTS SEG NFR BLD: 77 % (ref 43–78)
NEUTS SEG NFR BLD: 78 % (ref 43–78)
NEUTS SEG NFR BLD: 85 % (ref 43–78)
NEUTS SEG NFR BLD: 86 % (ref 43–78)
NEUTS SEG NFR BLD: 91 % (ref 43–78)
NRBC # BLD: 0 K/UL (ref 0–0.2)
NRBC # BLD: 0.02 K/UL (ref 0–0.2)
NRBC # BLD: 0.02 K/UL (ref 0–0.2)
NRBC # BLD: 0.03 K/UL (ref 0–0.2)
NRBC # BLD: 0.04 K/UL (ref 0–0.2)
O2/TOTAL GAS SETTING VFR VENT: 100 %
O2/TOTAL GAS SETTING VFR VENT: 90 %
O2/TOTAL GAS SETTING VFR VENT: 90 %
ORDER INITIALS, ORDINIT: NORMAL
P-R INTERVAL, ECG05: 126 MS
P-R INTERVAL, ECG05: 140 MS
PCO2 BLD: 27.6 MMHG (ref 35–45)
PCO2 BLD: 31.7 MMHG (ref 35–45)
PCO2 BLD: 32.6 MMHG (ref 35–45)
PCO2 BLD: 50.1 MMHG (ref 35–45)
PCO2 BLD: 52.4 MMHG (ref 35–45)
PCO2 BLD: 55.7 MMHG (ref 35–45)
PCO2 BLD: 58.4 MMHG (ref 35–45)
PCO2 BLD: 60.8 MMHG (ref 35–45)
PEEP RESPIRATORY: 12 CMH2O
PEEP RESPIRATORY: 12 CMH2O
PEEP RESPIRATORY: 15 CMH2O
PEEP RESPIRATORY: 16 CMH2O
PEEP RESPIRATORY: 16 CMH2O
PH BLD: 7.11 [PH] (ref 7.35–7.45)
PH BLD: 7.18 [PH] (ref 7.35–7.45)
PH BLD: 7.2 [PH] (ref 7.35–7.45)
PH BLD: 7.26 [PH] (ref 7.35–7.45)
PH BLD: 7.29 [PH] (ref 7.35–7.45)
PH BLD: 7.41 [PH] (ref 7.35–7.45)
PH BLD: 7.42 [PH] (ref 7.35–7.45)
PH BLD: 7.47 [PH] (ref 7.35–7.45)
PHOSPHATE SERPL-MCNC: 8.9 MG/DL (ref 2.3–3.7)
PIP ISTAT,IPIP: 19
PLATELET # BLD AUTO: 134 K/UL (ref 150–450)
PLATELET # BLD AUTO: 160 K/UL (ref 150–450)
PLATELET # BLD AUTO: 208 K/UL (ref 150–450)
PLATELET # BLD AUTO: 273 K/UL (ref 150–450)
PLATELET # BLD AUTO: 274 K/UL (ref 150–450)
PLATELET # BLD AUTO: 295 K/UL (ref 150–450)
PLATELET # BLD AUTO: 356 K/UL (ref 150–450)
PLATELET # BLD AUTO: 442 K/UL (ref 150–450)
PLATELET # BLD AUTO: 474 K/UL (ref 150–450)
PLATELET # BLD AUTO: 488 K/UL (ref 150–450)
PMV BLD AUTO: 10 FL (ref 9.4–12.3)
PMV BLD AUTO: 10 FL (ref 9.4–12.3)
PMV BLD AUTO: 10.1 FL (ref 9.4–12.3)
PMV BLD AUTO: 10.5 FL (ref 9.4–12.3)
PMV BLD AUTO: 9.6 FL (ref 9.4–12.3)
PMV BLD AUTO: 9.7 FL (ref 9.4–12.3)
PMV BLD AUTO: 9.8 FL (ref 9.4–12.3)
PMV BLD AUTO: 9.9 FL (ref 9.4–12.3)
PO2 BLD: 63 MMHG (ref 75–100)
PO2 BLD: 64 MMHG (ref 75–100)
PO2 BLD: 71 MMHG (ref 75–100)
PO2 BLD: 73 MMHG (ref 75–100)
PO2 BLD: 74 MMHG (ref 75–100)
PO2 BLD: 78 MMHG (ref 75–100)
PO2 BLD: 78 MMHG (ref 75–100)
PO2 BLD: 93 MMHG (ref 75–100)
POTASSIUM SERPL-SCNC: 4 MMOL/L (ref 3.5–5.1)
POTASSIUM SERPL-SCNC: 4.2 MMOL/L (ref 3.5–5.1)
POTASSIUM SERPL-SCNC: 4.2 MMOL/L (ref 3.5–5.1)
POTASSIUM SERPL-SCNC: 4.4 MMOL/L (ref 3.5–5.1)
POTASSIUM SERPL-SCNC: 4.5 MMOL/L (ref 3.5–5.1)
POTASSIUM SERPL-SCNC: 4.5 MMOL/L (ref 3.5–5.1)
POTASSIUM SERPL-SCNC: 4.6 MMOL/L (ref 3.5–5.1)
POTASSIUM SERPL-SCNC: 4.9 MMOL/L (ref 3.5–5.1)
POTASSIUM SERPL-SCNC: 5.3 MMOL/L (ref 3.5–5.1)
POTASSIUM SERPL-SCNC: 5.4 MMOL/L (ref 3.5–5.1)
POTASSIUM SERPL-SCNC: 5.7 MMOL/L (ref 3.5–5.1)
POTASSIUM SERPL-SCNC: 6 MMOL/L (ref 3.5–5.1)
PROCALCITONIN SERPL-MCNC: 0.1 NG/ML
PROCALCITONIN SERPL-MCNC: 0.16 NG/ML
PROT SERPL-MCNC: 6.2 G/DL (ref 6.3–8.2)
PROT SERPL-MCNC: 6.5 G/DL (ref 6.3–8.2)
PROT SERPL-MCNC: 6.8 G/DL (ref 6.3–8.2)
PROT SERPL-MCNC: 6.9 G/DL (ref 6.3–8.2)
PROT SERPL-MCNC: 6.9 G/DL (ref 6.3–8.2)
PROT SERPL-MCNC: 7.1 G/DL (ref 6.3–8.2)
PROT SERPL-MCNC: 7.1 G/DL (ref 6.3–8.2)
PROT SERPL-MCNC: 7.6 G/DL (ref 6.3–8.2)
PROTHROMBIN TIME: 13 SEC (ref 12.5–14.7)
PROTHROMBIN TIME: 13.4 SEC (ref 12.5–14.7)
PROTHROMBIN TIME: 13.4 SEC (ref 12.5–14.7)
PROTHROMBIN TIME: 13.6 SEC (ref 12.5–14.7)
PROTHROMBIN TIME: 13.8 SEC (ref 12.5–14.7)
PROTHROMBIN TIME: 13.8 SEC (ref 12.5–14.7)
PROTHROMBIN TIME: 13.9 SEC (ref 12.5–14.7)
PROTHROMBIN TIME: 14.1 SEC (ref 12.5–14.7)
PROTHROMBIN TIME: 14.2 SEC (ref 12.5–14.7)
PROTHROMBIN TIME: 14.6 SEC (ref 12.5–14.7)
PROTHROMBIN TIME: 15.3 SEC (ref 12.5–14.7)
PROTHROMBIN TIME: 15.7 SEC (ref 12.5–14.7)
Q-T INTERVAL, ECG07: 352 MS
Q-T INTERVAL, ECG07: 354 MS
QRS DURATION, ECG06: 102 MS
QRS DURATION, ECG06: 98 MS
QTC CALCULATION (BEZET), ECG08: 403 MS
QTC CALCULATION (BEZET), ECG08: 428 MS
RBC # BLD AUTO: 3.46 M/UL (ref 4.23–5.6)
RBC # BLD AUTO: 3.52 M/UL (ref 4.23–5.6)
RBC # BLD AUTO: 3.54 M/UL (ref 4.23–5.6)
RBC # BLD AUTO: 3.56 M/UL (ref 4.23–5.6)
RBC # BLD AUTO: 3.62 M/UL (ref 4.23–5.6)
RBC # BLD AUTO: 3.82 M/UL (ref 4.23–5.6)
RBC # BLD AUTO: 4.09 M/UL (ref 4.23–5.6)
RBC # BLD AUTO: 4.29 M/UL (ref 4.23–5.6)
RBC # BLD AUTO: 4.43 M/UL (ref 4.23–5.6)
RBC # BLD AUTO: 4.85 M/UL (ref 4.23–5.6)
SAO2 % BLD: 89 % (ref 95–98)
SAO2 % BLD: 90 % (ref 95–98)
SAO2 % BLD: 90 % (ref 95–98)
SAO2 % BLD: 92 % (ref 95–98)
SAO2 % BLD: 93 % (ref 95–98)
SAO2 % BLD: 94 % (ref 95–98)
SAO2 % BLD: 96 % (ref 95–98)
SAO2 % BLD: 96 % (ref 95–98)
SERVICE CMNT-IMP: ABNORMAL
SERVICE CMNT-IMP: NORMAL
SODIUM SERPL-SCNC: 128 MMOL/L (ref 138–145)
SODIUM SERPL-SCNC: 129 MMOL/L (ref 138–145)
SODIUM SERPL-SCNC: 133 MMOL/L (ref 138–145)
SODIUM SERPL-SCNC: 134 MMOL/L (ref 136–145)
SODIUM SERPL-SCNC: 134 MMOL/L (ref 136–145)
SODIUM SERPL-SCNC: 134 MMOL/L (ref 138–145)
SODIUM SERPL-SCNC: 135 MMOL/L (ref 136–145)
SODIUM SERPL-SCNC: 136 MMOL/L (ref 136–145)
SODIUM SERPL-SCNC: 136 MMOL/L (ref 138–145)
SODIUM SERPL-SCNC: 137 MMOL/L (ref 138–145)
SPECIMEN EXP DATE BLD: NORMAL
SPECIMEN TYPE: ABNORMAL
STATUS OF UNIT,%ST: NORMAL
TOTAL RESP. RATE, ITRR: 29
TROPONIN-HIGH SENSITIVITY: 10.3 PG/ML (ref 0–14)
TROPONIN-HIGH SENSITIVITY: 16.4 PG/ML (ref 0–14)
UNIT DIVISION, %UDIV: NORMAL
VENTILATION MODE VENT: ABNORMAL
VENTRICULAR RATE, ECG03: 79 BPM
VENTRICULAR RATE, ECG03: 88 BPM
VT SETTING VENT: 440 ML
VT SETTING VENT: 460 ML
VT SETTING VENT: 608 ML
WBC # BLD AUTO: 10.6 K/UL (ref 4.3–11.1)
WBC # BLD AUTO: 11.2 K/UL (ref 4.3–11.1)
WBC # BLD AUTO: 11.5 K/UL (ref 4.3–11.1)
WBC # BLD AUTO: 12.9 K/UL (ref 4.3–11.1)
WBC # BLD AUTO: 14.6 K/UL (ref 4.3–11.1)
WBC # BLD AUTO: 20.4 K/UL (ref 4.3–11.1)
WBC # BLD AUTO: 23.6 K/UL (ref 4.3–11.1)
WBC # BLD AUTO: 31.3 K/UL (ref 4.3–11.1)
WBC # BLD AUTO: 4.3 K/UL (ref 4.3–11.1)
WBC # BLD AUTO: 5 K/UL (ref 4.3–11.1)

## 2021-01-01 PROCEDURE — 74011250636 HC RX REV CODE- 250/636: Performed by: FAMILY MEDICINE

## 2021-01-01 PROCEDURE — 87186 SC STD MICRODIL/AGAR DIL: CPT

## 2021-01-01 PROCEDURE — 77030012390 HC DRN CHST BTL GTNG -B

## 2021-01-01 PROCEDURE — 77030008771 HC TU NG SALEM SUMP -A

## 2021-01-01 PROCEDURE — 97110 THERAPEUTIC EXERCISES: CPT | Performed by: PHYSICAL THERAPIST

## 2021-01-01 PROCEDURE — 74018 RADEX ABDOMEN 1 VIEW: CPT

## 2021-01-01 PROCEDURE — 85384 FIBRINOGEN ACTIVITY: CPT

## 2021-01-01 PROCEDURE — 74011000250 HC RX REV CODE- 250: Performed by: FAMILY MEDICINE

## 2021-01-01 PROCEDURE — 74011636637 HC RX REV CODE- 636/637: Performed by: FAMILY MEDICINE

## 2021-01-01 PROCEDURE — 36415 COLL VENOUS BLD VENIPUNCTURE: CPT

## 2021-01-01 PROCEDURE — 82962 GLUCOSE BLOOD TEST: CPT

## 2021-01-01 PROCEDURE — 85379 FIBRIN DEGRADATION QUANT: CPT

## 2021-01-01 PROCEDURE — 83735 ASSAY OF MAGNESIUM: CPT

## 2021-01-01 PROCEDURE — 77010033711 HC HIGH FLOW OXYGEN

## 2021-01-01 PROCEDURE — 84145 PROCALCITONIN (PCT): CPT

## 2021-01-01 PROCEDURE — 74011250636 HC RX REV CODE- 250/636: Performed by: INTERNAL MEDICINE

## 2021-01-01 PROCEDURE — 77030021668 HC NEB PREFIL KT VYRM -A

## 2021-01-01 PROCEDURE — 05H533Z INSERTION OF INFUSION DEVICE INTO RIGHT SUBCLAVIAN VEIN, PERCUTANEOUS APPROACH: ICD-10-PCS | Performed by: INTERNAL MEDICINE

## 2021-01-01 PROCEDURE — 96375 TX/PRO/DX INJ NEW DRUG ADDON: CPT

## 2021-01-01 PROCEDURE — 77030013794 HC KT TRNSDUC BLD EDWD -B

## 2021-01-01 PROCEDURE — 74011636637 HC RX REV CODE- 636/637: Performed by: HOSPITALIST

## 2021-01-01 PROCEDURE — 85025 COMPLETE CBC W/AUTO DIFF WBC: CPT

## 2021-01-01 PROCEDURE — 2709999900 HC NON-CHARGEABLE SUPPLY

## 2021-01-01 PROCEDURE — 82803 BLOOD GASES ANY COMBINATION: CPT

## 2021-01-01 PROCEDURE — 74011250637 HC RX REV CODE- 250/637: Performed by: INTERNAL MEDICINE

## 2021-01-01 PROCEDURE — 65660000000 HC RM CCU STEPDOWN

## 2021-01-01 PROCEDURE — 74011250637 HC RX REV CODE- 250/637: Performed by: FAMILY MEDICINE

## 2021-01-01 PROCEDURE — 94660 CPAP INITIATION&MGMT: CPT

## 2021-01-01 PROCEDURE — 85610 PROTHROMBIN TIME: CPT

## 2021-01-01 PROCEDURE — 77030034850

## 2021-01-01 PROCEDURE — 87040 BLOOD CULTURE FOR BACTERIA: CPT

## 2021-01-01 PROCEDURE — 94760 N-INVAS EAR/PLS OXIMETRY 1: CPT

## 2021-01-01 PROCEDURE — 84484 ASSAY OF TROPONIN QUANT: CPT

## 2021-01-01 PROCEDURE — 71045 X-RAY EXAM CHEST 1 VIEW: CPT

## 2021-01-01 PROCEDURE — 83036 HEMOGLOBIN GLYCOSYLATED A1C: CPT

## 2021-01-01 PROCEDURE — 97165 OT EVAL LOW COMPLEX 30 MIN: CPT

## 2021-01-01 PROCEDURE — 77030002996 HC SUT SLK J&J -A

## 2021-01-01 PROCEDURE — C1729 CATH, DRAINAGE: HCPCS

## 2021-01-01 PROCEDURE — 80053 COMPREHEN METABOLIC PANEL: CPT

## 2021-01-01 PROCEDURE — 87086 URINE CULTURE/COLONY COUNT: CPT

## 2021-01-01 PROCEDURE — 96374 THER/PROPH/DIAG INJ IV PUSH: CPT

## 2021-01-01 PROCEDURE — 74011000250 HC RX REV CODE- 250

## 2021-01-01 PROCEDURE — 74011000258 HC RX REV CODE- 258

## 2021-01-01 PROCEDURE — 99291 CRITICAL CARE FIRST HOUR: CPT | Performed by: INTERNAL MEDICINE

## 2021-01-01 PROCEDURE — 74011000258 HC RX REV CODE- 258: Performed by: INTERNAL MEDICINE

## 2021-01-01 PROCEDURE — 99233 SBSQ HOSP IP/OBS HIGH 50: CPT | Performed by: INTERNAL MEDICINE

## 2021-01-01 PROCEDURE — 36430 TRANSFUSION BLD/BLD COMPNT: CPT

## 2021-01-01 PROCEDURE — 74011000250 HC RX REV CODE- 250: Performed by: INTERNAL MEDICINE

## 2021-01-01 PROCEDURE — 85018 HEMOGLOBIN: CPT

## 2021-01-01 PROCEDURE — 36600 WITHDRAWAL OF ARTERIAL BLOOD: CPT

## 2021-01-01 PROCEDURE — 86901 BLOOD TYPING SEROLOGIC RH(D): CPT

## 2021-01-01 PROCEDURE — 5A1945Z RESPIRATORY VENTILATION, 24-96 CONSECUTIVE HOURS: ICD-10-PCS | Performed by: INTERNAL MEDICINE

## 2021-01-01 PROCEDURE — 0W9930Z DRAINAGE OF RIGHT PLEURAL CAVITY WITH DRAINAGE DEVICE, PERCUTANEOUS APPROACH: ICD-10-PCS | Performed by: INTERNAL MEDICINE

## 2021-01-01 PROCEDURE — 32551 INSERTION OF CHEST TUBE: CPT | Performed by: INTERNAL MEDICINE

## 2021-01-01 PROCEDURE — 97530 THERAPEUTIC ACTIVITIES: CPT

## 2021-01-01 PROCEDURE — 36556 INSERT NON-TUNNEL CV CATH: CPT | Performed by: INTERNAL MEDICINE

## 2021-01-01 PROCEDURE — 83605 ASSAY OF LACTIC ACID: CPT

## 2021-01-01 PROCEDURE — 85027 COMPLETE CBC AUTOMATED: CPT

## 2021-01-01 PROCEDURE — 97535 SELF CARE MNGMENT TRAINING: CPT

## 2021-01-01 PROCEDURE — 80048 BASIC METABOLIC PNL TOTAL CA: CPT

## 2021-01-01 PROCEDURE — 0BH17EZ INSERTION OF ENDOTRACHEAL AIRWAY INTO TRACHEA, VIA NATURAL OR ARTIFICIAL OPENING: ICD-10-PCS | Performed by: INTERNAL MEDICINE

## 2021-01-01 PROCEDURE — 83880 ASSAY OF NATRIURETIC PEPTIDE: CPT

## 2021-01-01 PROCEDURE — 87070 CULTURE OTHR SPECIMN AEROBIC: CPT

## 2021-01-01 PROCEDURE — XW033E5 INTRODUCTION OF REMDESIVIR ANTI-INFECTIVE INTO PERIPHERAL VEIN, PERCUTANEOUS APPROACH, NEW TECHNOLOGY GROUP 5: ICD-10-PCS | Performed by: FAMILY MEDICINE

## 2021-01-01 PROCEDURE — 97161 PT EVAL LOW COMPLEX 20 MIN: CPT

## 2021-01-01 PROCEDURE — 94762 N-INVAS EAR/PLS OXIMTRY CONT: CPT

## 2021-01-01 PROCEDURE — 36556 INSERT NON-TUNNEL CV CATH: CPT

## 2021-01-01 PROCEDURE — 5A09357 ASSISTANCE WITH RESPIRATORY VENTILATION, LESS THAN 24 CONSECUTIVE HOURS, CONTINUOUS POSITIVE AIRWAY PRESSURE: ICD-10-PCS | Performed by: HOSPITALIST

## 2021-01-01 PROCEDURE — 77030031476 HC EXCH HEAT MOISTW FLTR HALY -A

## 2021-01-01 PROCEDURE — XW13325 TRANSFUSION OF CONVALESCENT PLASMA (NONAUTOLOGOUS) INTO PERIPHERAL VEIN, PERCUTANEOUS APPROACH, NEW TECHNOLOGY GROUP 5: ICD-10-PCS | Performed by: EMERGENCY MEDICINE

## 2021-01-01 PROCEDURE — 87077 CULTURE AEROBIC IDENTIFY: CPT

## 2021-01-01 PROCEDURE — 74011250636 HC RX REV CODE- 250/636: Performed by: EMERGENCY MEDICINE

## 2021-01-01 PROCEDURE — 5A2204Z RESTORATION OF CARDIAC RHYTHM, SINGLE: ICD-10-PCS | Performed by: INTERNAL MEDICINE

## 2021-01-01 PROCEDURE — 94003 VENT MGMT INPAT SUBQ DAY: CPT

## 2021-01-01 PROCEDURE — 36592 COLLECT BLOOD FROM PICC: CPT

## 2021-01-01 PROCEDURE — 99232 SBSQ HOSP IP/OBS MODERATE 35: CPT | Performed by: INTERNAL MEDICINE

## 2021-01-01 PROCEDURE — 74011000636 HC RX REV CODE- 636: Performed by: FAMILY MEDICINE

## 2021-01-01 PROCEDURE — 84100 ASSAY OF PHOSPHORUS: CPT

## 2021-01-01 PROCEDURE — 99284 EMERGENCY DEPT VISIT MOD MDM: CPT

## 2021-01-01 PROCEDURE — 77030005402 HC CATH RAD ART LN KT TELE -B

## 2021-01-01 PROCEDURE — 03HY32Z INSERTION OF MONITORING DEVICE INTO UPPER ARTERY, PERCUTANEOUS APPROACH: ICD-10-PCS | Performed by: ANESTHESIOLOGY

## 2021-01-01 PROCEDURE — 74011250636 HC RX REV CODE- 250/636: Performed by: NURSE PRACTITIONER

## 2021-01-01 PROCEDURE — 32551 INSERTION OF CHEST TUBE: CPT

## 2021-01-01 PROCEDURE — 86140 C-REACTIVE PROTEIN: CPT

## 2021-01-01 PROCEDURE — 99223 1ST HOSP IP/OBS HIGH 75: CPT | Performed by: INTERNAL MEDICINE

## 2021-01-01 PROCEDURE — 77030040393 HC DRSG OPTIFOAM GENT MDII -B

## 2021-01-01 PROCEDURE — 65620000000 HC RM CCU GENERAL

## 2021-01-01 PROCEDURE — 36620 INSERTION CATHETER ARTERY: CPT

## 2021-01-01 PROCEDURE — C1751 CATH, INF, PER/CENT/MIDLINE: HCPCS

## 2021-01-01 PROCEDURE — 74011000258 HC RX REV CODE- 258: Performed by: EMERGENCY MEDICINE

## 2021-01-01 PROCEDURE — 94002 VENT MGMT INPAT INIT DAY: CPT

## 2021-01-01 PROCEDURE — 83615 LACTATE (LD) (LDH) ENZYME: CPT

## 2021-01-01 PROCEDURE — 82728 ASSAY OF FERRITIN: CPT

## 2021-01-01 PROCEDURE — 93005 ELECTROCARDIOGRAM TRACING: CPT | Performed by: HOSPITALIST

## 2021-01-01 PROCEDURE — 5A09457 ASSISTANCE WITH RESPIRATORY VENTILATION, 24-96 CONSECUTIVE HOURS, CONTINUOUS POSITIVE AIRWAY PRESSURE: ICD-10-PCS | Performed by: INTERNAL MEDICINE

## 2021-01-01 PROCEDURE — 31500 INSERT EMERGENCY AIRWAY: CPT | Performed by: INTERNAL MEDICINE

## 2021-01-01 PROCEDURE — 93005 ELECTROCARDIOGRAM TRACING: CPT | Performed by: EMERGENCY MEDICINE

## 2021-01-01 PROCEDURE — 74011636637 HC RX REV CODE- 636/637: Performed by: INTERNAL MEDICINE

## 2021-01-01 PROCEDURE — 74011000258 HC RX REV CODE- 258: Performed by: FAMILY MEDICINE

## 2021-01-01 RX ORDER — INSULIN GLARGINE 100 [IU]/ML
30 INJECTION, SOLUTION SUBCUTANEOUS
Status: DISCONTINUED | OUTPATIENT
Start: 2021-01-01 | End: 2021-01-01

## 2021-01-01 RX ORDER — ALBUMIN HUMAN 50 G/1000ML
25 SOLUTION INTRAVENOUS ONCE
Status: DISCONTINUED | OUTPATIENT
Start: 2021-01-01 | End: 2021-01-01 | Stop reason: HOSPADM

## 2021-01-01 RX ORDER — GUAIFENESIN 100 MG/5ML
81 LIQUID (ML) ORAL DAILY
Status: DISCONTINUED | OUTPATIENT
Start: 2021-01-01 | End: 2021-01-01 | Stop reason: HOSPADM

## 2021-01-01 RX ORDER — DEXTROSE 50 % IN WATER (D50W) INTRAVENOUS SYRINGE
25-50 AS NEEDED
Status: DISCONTINUED | OUTPATIENT
Start: 2021-01-01 | End: 2021-01-01 | Stop reason: HOSPADM

## 2021-01-01 RX ORDER — AMIODARONE HYDROCHLORIDE 150 MG/3ML
INJECTION, SOLUTION INTRAVENOUS
Status: COMPLETED | OUTPATIENT
Start: 2021-01-01 | End: 2021-01-01

## 2021-01-01 RX ORDER — FAMOTIDINE 40 MG/5ML
20 POWDER, FOR SUSPENSION ORAL EVERY 24 HOURS
Status: DISCONTINUED | OUTPATIENT
Start: 2021-01-01 | End: 2021-01-01 | Stop reason: HOSPADM

## 2021-01-01 RX ORDER — FENTANYL CITRATE 50 UG/ML
50 INJECTION, SOLUTION INTRAMUSCULAR; INTRAVENOUS
Status: DISCONTINUED | OUTPATIENT
Start: 2021-01-01 | End: 2021-01-01 | Stop reason: HOSPADM

## 2021-01-01 RX ORDER — ACETAMINOPHEN 325 MG/1
650 TABLET ORAL
Status: DISCONTINUED | OUTPATIENT
Start: 2021-01-01 | End: 2021-01-01 | Stop reason: HOSPADM

## 2021-01-01 RX ORDER — POLYETHYLENE GLYCOL 3350 17 G/17G
17 POWDER, FOR SOLUTION ORAL DAILY PRN
Status: DISCONTINUED | OUTPATIENT
Start: 2021-01-01 | End: 2021-01-01

## 2021-01-01 RX ORDER — ONDANSETRON 2 MG/ML
4 INJECTION INTRAMUSCULAR; INTRAVENOUS
Status: DISCONTINUED | OUTPATIENT
Start: 2021-01-01 | End: 2021-01-01 | Stop reason: HOSPADM

## 2021-01-01 RX ORDER — FENTANYL CITRATE-0.9 % NACL/PF 25 MCG/ML
0-200 PLASTIC BAG, INJECTION (ML) INJECTION
Status: DISCONTINUED | OUTPATIENT
Start: 2021-01-01 | End: 2021-01-01

## 2021-01-01 RX ORDER — INSULIN LISPRO 100 [IU]/ML
9 INJECTION, SOLUTION INTRAVENOUS; SUBCUTANEOUS
Status: DISCONTINUED | OUTPATIENT
Start: 2021-01-01 | End: 2021-01-01

## 2021-01-01 RX ORDER — INSULIN GLARGINE 100 [IU]/ML
40 INJECTION, SOLUTION SUBCUTANEOUS
Status: DISCONTINUED | OUTPATIENT
Start: 2021-01-01 | End: 2021-01-01 | Stop reason: HOSPADM

## 2021-01-01 RX ORDER — INSULIN GLARGINE 100 [IU]/ML
20 INJECTION, SOLUTION SUBCUTANEOUS
Status: DISCONTINUED | OUTPATIENT
Start: 2021-01-01 | End: 2021-01-01

## 2021-01-01 RX ORDER — DEXAMETHASONE 4 MG/1
6 TABLET ORAL DAILY
Status: DISCONTINUED | OUTPATIENT
Start: 2021-01-01 | End: 2021-01-01

## 2021-01-01 RX ORDER — SODIUM BICARBONATE 84 MG/ML
50 INJECTION, SOLUTION INTRAVENOUS ONCE
Status: COMPLETED | OUTPATIENT
Start: 2021-01-01 | End: 2021-01-01

## 2021-01-01 RX ORDER — GUAIFENESIN/DEXTROMETHORPHAN 100-10MG/5
5 SYRUP ORAL
Status: DISCONTINUED | OUTPATIENT
Start: 2021-01-01 | End: 2021-01-01 | Stop reason: HOSPADM

## 2021-01-01 RX ORDER — MELATONIN
1000 DAILY
Status: DISCONTINUED | OUTPATIENT
Start: 2021-01-01 | End: 2021-01-01

## 2021-01-01 RX ORDER — FAMOTIDINE 20 MG/1
20 TABLET, FILM COATED ORAL 2 TIMES DAILY
Status: DISCONTINUED | OUTPATIENT
Start: 2021-01-01 | End: 2021-01-01

## 2021-01-01 RX ORDER — ALBUTEROL SULFATE 90 UG/1
2 AEROSOL, METERED RESPIRATORY (INHALATION)
Status: DISCONTINUED | OUTPATIENT
Start: 2021-01-01 | End: 2021-01-01 | Stop reason: HOSPADM

## 2021-01-01 RX ORDER — INSULIN LISPRO 100 [IU]/ML
5 INJECTION, SOLUTION INTRAVENOUS; SUBCUTANEOUS ONCE
Status: COMPLETED | OUTPATIENT
Start: 2021-01-01 | End: 2021-01-01

## 2021-01-01 RX ORDER — ACETAMINOPHEN 325 MG/1
650 TABLET ORAL
Status: DISCONTINUED | OUTPATIENT
Start: 2021-01-01 | End: 2021-01-01

## 2021-01-01 RX ORDER — MIDAZOLAM IN 0.9 % SOD.CHLORID 1 MG/ML
0-10 PLASTIC BAG, INJECTION (ML) INTRAVENOUS
Status: DISCONTINUED | OUTPATIENT
Start: 2021-01-01 | End: 2021-01-01

## 2021-01-01 RX ORDER — CALCIUM CHLORIDE INJECTION 100 MG/ML
INJECTION, SOLUTION INTRAVENOUS
Status: COMPLETED | OUTPATIENT
Start: 2021-01-01 | End: 2021-01-01

## 2021-01-01 RX ORDER — FENTANYL CITRATE-0.9 % NACL/PF 25 MCG/ML
0-200 PLASTIC BAG, INJECTION (ML) INJECTION
Status: DISCONTINUED | OUTPATIENT
Start: 2021-01-01 | End: 2021-01-01 | Stop reason: HOSPADM

## 2021-01-01 RX ORDER — DEXTROSE 40 %
15 GEL (GRAM) ORAL AS NEEDED
Status: DISCONTINUED | OUTPATIENT
Start: 2021-01-01 | End: 2021-01-01 | Stop reason: HOSPADM

## 2021-01-01 RX ORDER — ASPIRIN 81 MG/1
81 TABLET ORAL
Status: DISCONTINUED | OUTPATIENT
Start: 2021-01-01 | End: 2021-01-01

## 2021-01-01 RX ORDER — ATRACURIUM BESYLATE 10 MG/ML
0.5 INJECTION, SOLUTION INTRAVENOUS ONCE
Status: COMPLETED | OUTPATIENT
Start: 2021-01-01 | End: 2021-01-01

## 2021-01-01 RX ORDER — ACETAMINOPHEN 650 MG/1
650 SUPPOSITORY RECTAL
Status: DISCONTINUED | OUTPATIENT
Start: 2021-01-01 | End: 2021-01-01

## 2021-01-01 RX ORDER — FENTANYL CITRATE 50 UG/ML
100 INJECTION, SOLUTION INTRAMUSCULAR; INTRAVENOUS ONCE
Status: COMPLETED | OUTPATIENT
Start: 2021-01-01 | End: 2021-01-01

## 2021-01-01 RX ORDER — SODIUM BICARBONATE 84 MG/ML
100 INJECTION, SOLUTION INTRAVENOUS ONCE
Status: DISCONTINUED | OUTPATIENT
Start: 2021-01-01 | End: 2021-01-01 | Stop reason: HOSPADM

## 2021-01-01 RX ORDER — ENOXAPARIN SODIUM 100 MG/ML
40 INJECTION SUBCUTANEOUS EVERY 12 HOURS
Status: DISCONTINUED | OUTPATIENT
Start: 2021-01-01 | End: 2021-01-01

## 2021-01-01 RX ORDER — MIDAZOLAM IN 0.9 % SOD.CHLORID 1 MG/ML
0-10 PLASTIC BAG, INJECTION (ML) INTRAVENOUS
Status: DISCONTINUED | OUTPATIENT
Start: 2021-01-01 | End: 2021-01-01 | Stop reason: HOSPADM

## 2021-01-01 RX ORDER — INSULIN LISPRO 100 [IU]/ML
INJECTION, SOLUTION INTRAVENOUS; SUBCUTANEOUS
Status: DISCONTINUED | OUTPATIENT
Start: 2021-01-01 | End: 2021-01-01

## 2021-01-01 RX ORDER — PROMETHAZINE HYDROCHLORIDE 25 MG/1
12.5 TABLET ORAL
Status: DISCONTINUED | OUTPATIENT
Start: 2021-01-01 | End: 2021-01-01

## 2021-01-01 RX ORDER — INSULIN LISPRO 100 [IU]/ML
5 INJECTION, SOLUTION INTRAVENOUS; SUBCUTANEOUS
Status: DISCONTINUED | OUTPATIENT
Start: 2021-01-01 | End: 2021-01-01

## 2021-01-01 RX ORDER — SODIUM POLYSTYRENE SULFONATE 15 G/60ML
15 SUSPENSION ORAL; RECTAL EVERY 6 HOURS
Status: DISCONTINUED | OUTPATIENT
Start: 2021-01-01 | End: 2021-01-01 | Stop reason: HOSPADM

## 2021-01-01 RX ORDER — SODIUM CHLORIDE 9 MG/ML
8 INJECTION, SOLUTION INTRAVENOUS
Status: DISCONTINUED | OUTPATIENT
Start: 2021-01-01 | End: 2021-01-01

## 2021-01-01 RX ORDER — VANCOMYCIN HYDROCHLORIDE
1250 ONCE
Status: COMPLETED | OUTPATIENT
Start: 2021-01-01 | End: 2021-01-01

## 2021-01-01 RX ORDER — INSULIN LISPRO 100 [IU]/ML
6 INJECTION, SOLUTION INTRAVENOUS; SUBCUTANEOUS ONCE
Status: COMPLETED | OUTPATIENT
Start: 2021-01-01 | End: 2021-01-01

## 2021-01-01 RX ORDER — FUROSEMIDE 10 MG/ML
20 INJECTION INTRAMUSCULAR; INTRAVENOUS ONCE
Status: COMPLETED | OUTPATIENT
Start: 2021-01-01 | End: 2021-01-01

## 2021-01-01 RX ORDER — ATORVASTATIN CALCIUM 40 MG/1
40 TABLET, FILM COATED ORAL
Status: DISCONTINUED | OUTPATIENT
Start: 2021-01-01 | End: 2021-01-01

## 2021-01-01 RX ORDER — ETOMIDATE 2 MG/ML
0.3 INJECTION INTRAVENOUS ONCE
Status: COMPLETED | OUTPATIENT
Start: 2021-01-01 | End: 2021-01-01

## 2021-01-01 RX ORDER — SODIUM CHLORIDE 9 MG/ML
250 INJECTION, SOLUTION INTRAVENOUS AS NEEDED
Status: DISCONTINUED | OUTPATIENT
Start: 2021-01-01 | End: 2021-01-01 | Stop reason: HOSPADM

## 2021-01-01 RX ORDER — SODIUM BICARBONATE 1 MEQ/ML
SYRINGE (ML) INTRAVENOUS
Status: COMPLETED | OUTPATIENT
Start: 2021-01-01 | End: 2021-01-01

## 2021-01-01 RX ORDER — SODIUM CHLORIDE 0.9 % (FLUSH) 0.9 %
5-40 SYRINGE (ML) INJECTION EVERY 8 HOURS
Status: DISCONTINUED | OUTPATIENT
Start: 2021-01-01 | End: 2021-01-01 | Stop reason: HOSPADM

## 2021-01-01 RX ORDER — NOREPINEPHRINE BITARTRATE/D5W 4MG/250ML
.5-3 PLASTIC BAG, INJECTION (ML) INTRAVENOUS
Status: DISCONTINUED | OUTPATIENT
Start: 2021-01-01 | End: 2021-01-01

## 2021-01-01 RX ORDER — SODIUM BICARBONATE 84 MG/ML
INJECTION, SOLUTION INTRAVENOUS
Status: COMPLETED
Start: 2021-01-01 | End: 2021-01-01

## 2021-01-01 RX ORDER — DEXAMETHASONE 4 MG/1
6 TABLET ORAL EVERY 12 HOURS
Status: DISCONTINUED | OUTPATIENT
Start: 2021-01-01 | End: 2021-01-01

## 2021-01-01 RX ORDER — SODIUM BICARBONATE 84 MG/ML
100 INJECTION, SOLUTION INTRAVENOUS ONCE
Status: COMPLETED | OUTPATIENT
Start: 2021-01-01 | End: 2021-01-01

## 2021-01-01 RX ORDER — INSULIN GLARGINE 100 [IU]/ML
10 INJECTION, SOLUTION SUBCUTANEOUS
Status: DISCONTINUED | OUTPATIENT
Start: 2021-01-01 | End: 2021-01-01

## 2021-01-01 RX ORDER — SODIUM CHLORIDE 9 MG/ML
8 INJECTION, SOLUTION INTRAVENOUS
Status: DISCONTINUED | OUTPATIENT
Start: 2021-01-01 | End: 2021-01-01 | Stop reason: HOSPADM

## 2021-01-01 RX ORDER — ENOXAPARIN SODIUM 100 MG/ML
30 INJECTION SUBCUTANEOUS EVERY 12 HOURS
Status: DISCONTINUED | OUTPATIENT
Start: 2021-01-01 | End: 2021-01-01

## 2021-01-01 RX ORDER — EPINEPHRINE 0.1 MG/ML
INJECTION INTRACARDIAC; INTRAVENOUS
Status: COMPLETED | OUTPATIENT
Start: 2021-01-01 | End: 2021-01-01

## 2021-01-01 RX ORDER — HEPARIN SODIUM 5000 [USP'U]/ML
5000 INJECTION, SOLUTION INTRAVENOUS; SUBCUTANEOUS EVERY 8 HOURS
Status: DISCONTINUED | OUTPATIENT
Start: 2021-01-01 | End: 2021-01-01 | Stop reason: HOSPADM

## 2021-01-01 RX ORDER — LISINOPRIL 5 MG/1
10 TABLET ORAL
Status: DISCONTINUED | OUTPATIENT
Start: 2021-01-01 | End: 2021-01-01

## 2021-01-01 RX ORDER — MAGNESIUM SULFATE HEPTAHYDRATE 40 MG/ML
INJECTION, SOLUTION INTRAVENOUS
Status: COMPLETED | OUTPATIENT
Start: 2021-01-01 | End: 2021-01-01

## 2021-01-01 RX ORDER — METOPROLOL TARTRATE 25 MG/1
25 TABLET, FILM COATED ORAL
Status: DISCONTINUED | OUTPATIENT
Start: 2021-01-01 | End: 2021-01-01

## 2021-01-01 RX ORDER — INSULIN LISPRO 100 [IU]/ML
15 INJECTION, SOLUTION INTRAVENOUS; SUBCUTANEOUS
Status: DISCONTINUED | OUTPATIENT
Start: 2021-01-01 | End: 2021-01-01

## 2021-01-01 RX ORDER — INSULIN GLARGINE 100 [IU]/ML
10 INJECTION, SOLUTION SUBCUTANEOUS ONCE
Status: DISCONTINUED | OUTPATIENT
Start: 2021-01-01 | End: 2021-01-01

## 2021-01-01 RX ORDER — METOPROLOL TARTRATE 25 MG/1
25 TABLET, FILM COATED ORAL
Status: DISCONTINUED | OUTPATIENT
Start: 2021-01-01 | End: 2021-01-01 | Stop reason: HOSPADM

## 2021-01-01 RX ORDER — ROCURONIUM BROMIDE 10 MG/ML
1 INJECTION, SOLUTION INTRAVENOUS
Status: ACTIVE | OUTPATIENT
Start: 2021-01-01 | End: 2021-01-01

## 2021-01-01 RX ORDER — SODIUM CHLORIDE 0.9 % (FLUSH) 0.9 %
10 SYRINGE (ML) INJECTION
Status: COMPLETED | OUTPATIENT
Start: 2021-01-01 | End: 2021-01-01

## 2021-01-01 RX ORDER — POLYETHYLENE GLYCOL 3350 17 G/17G
17 POWDER, FOR SOLUTION ORAL DAILY PRN
Status: DISCONTINUED | OUTPATIENT
Start: 2021-01-01 | End: 2021-01-01 | Stop reason: HOSPADM

## 2021-01-01 RX ORDER — ACETAMINOPHEN 650 MG/1
650 SUPPOSITORY RECTAL
Status: DISCONTINUED | OUTPATIENT
Start: 2021-01-01 | End: 2021-01-01 | Stop reason: HOSPADM

## 2021-01-01 RX ORDER — UREA 10 %
220 LOTION (ML) TOPICAL DAILY
Status: DISCONTINUED | OUTPATIENT
Start: 2021-01-01 | End: 2021-01-01

## 2021-01-01 RX ORDER — ATORVASTATIN CALCIUM 40 MG/1
40 TABLET, FILM COATED ORAL
Status: DISCONTINUED | OUTPATIENT
Start: 2021-01-01 | End: 2021-01-01 | Stop reason: HOSPADM

## 2021-01-01 RX ORDER — ROCURONIUM BROMIDE 10 MG/ML
INJECTION, SOLUTION INTRAVENOUS
Status: COMPLETED
Start: 2021-01-01 | End: 2021-01-01

## 2021-01-01 RX ORDER — ONDANSETRON 2 MG/ML
4 INJECTION INTRAMUSCULAR; INTRAVENOUS
Status: DISCONTINUED | OUTPATIENT
Start: 2021-01-01 | End: 2021-01-01

## 2021-01-01 RX ORDER — PROMETHAZINE HYDROCHLORIDE 25 MG/1
12.5 TABLET ORAL
Status: DISCONTINUED | OUTPATIENT
Start: 2021-01-01 | End: 2021-01-01 | Stop reason: HOSPADM

## 2021-01-01 RX ORDER — SODIUM CHLORIDE 0.9 % (FLUSH) 0.9 %
5-40 SYRINGE (ML) INJECTION AS NEEDED
Status: DISCONTINUED | OUTPATIENT
Start: 2021-01-01 | End: 2021-01-01 | Stop reason: HOSPADM

## 2021-01-01 RX ORDER — MIDAZOLAM HYDROCHLORIDE 1 MG/ML
2 INJECTION, SOLUTION INTRAMUSCULAR; INTRAVENOUS
Status: DISCONTINUED | OUTPATIENT
Start: 2021-01-01 | End: 2021-01-01 | Stop reason: HOSPADM

## 2021-01-01 RX ORDER — ASCORBIC ACID 500 MG
1000 TABLET ORAL DAILY
Status: DISCONTINUED | OUTPATIENT
Start: 2021-01-01 | End: 2021-01-01

## 2021-01-01 RX ORDER — NOREPINEPHRINE BITARTRATE/D5W 4MG/250ML
PLASTIC BAG, INJECTION (ML) INTRAVENOUS
Status: COMPLETED
Start: 2021-01-01 | End: 2021-01-01

## 2021-01-01 RX ORDER — DEXAMETHASONE SODIUM PHOSPHATE 100 MG/10ML
6 INJECTION INTRAMUSCULAR; INTRAVENOUS
Status: COMPLETED | OUTPATIENT
Start: 2021-01-01 | End: 2021-01-01

## 2021-01-01 RX ORDER — DEXAMETHASONE SODIUM PHOSPHATE 100 MG/10ML
6 INJECTION INTRAMUSCULAR; INTRAVENOUS EVERY 24 HOURS
Status: DISCONTINUED | OUTPATIENT
Start: 2021-01-01 | End: 2021-01-01 | Stop reason: HOSPADM

## 2021-01-01 RX ORDER — INSULIN GLARGINE 100 [IU]/ML
25 INJECTION, SOLUTION SUBCUTANEOUS
Status: DISCONTINUED | OUTPATIENT
Start: 2021-01-01 | End: 2021-01-01

## 2021-01-01 RX ORDER — INSULIN LISPRO 100 [IU]/ML
11 INJECTION, SOLUTION INTRAVENOUS; SUBCUTANEOUS
Status: DISCONTINUED | OUTPATIENT
Start: 2021-01-01 | End: 2021-01-01

## 2021-01-01 RX ADMIN — INSULIN LISPRO 5 UNITS: 100 INJECTION, SOLUTION INTRAVENOUS; SUBCUTANEOUS at 17:37

## 2021-01-01 RX ADMIN — INSULIN LISPRO 9 UNITS: 100 INJECTION, SOLUTION INTRAVENOUS; SUBCUTANEOUS at 12:17

## 2021-01-01 RX ADMIN — SODIUM POLYSTYRENE SULFONATE 15 G: 15 SUSPENSION ORAL; RECTAL at 12:11

## 2021-01-01 RX ADMIN — EPINEPHRINE 1 MG: 0.1 INJECTION, SOLUTION ENDOTRACHEAL; INTRACARDIAC; INTRAVENOUS at 16:58

## 2021-01-01 RX ADMIN — ENOXAPARIN SODIUM 30 MG: 30 INJECTION SUBCUTANEOUS at 02:00

## 2021-01-01 RX ADMIN — ENOXAPARIN SODIUM 40 MG: 40 INJECTION SUBCUTANEOUS at 01:59

## 2021-01-01 RX ADMIN — Medication 10 ML: at 21:41

## 2021-01-01 RX ADMIN — EPINEPHRINE 1 MG: 0.1 INJECTION, SOLUTION ENDOTRACHEAL; INTRACARDIAC; INTRAVENOUS at 16:42

## 2021-01-01 RX ADMIN — MULTIPLE VITAMINS W/ MINERALS TAB 1 TABLET: TAB at 08:12

## 2021-01-01 RX ADMIN — ATRACURIUM BESYLATE 37 MG: 10 INJECTION, SOLUTION INTRAVENOUS at 16:21

## 2021-01-01 RX ADMIN — INSULIN LISPRO 6 UNITS: 100 INJECTION, SOLUTION INTRAVENOUS; SUBCUTANEOUS at 21:13

## 2021-01-01 RX ADMIN — SODIUM BICARBONATE 100 MEQ: 84 INJECTION, SOLUTION INTRAVENOUS at 16:52

## 2021-01-01 RX ADMIN — FAMOTIDINE 20 MG: 20 TABLET, FILM COATED ORAL at 17:01

## 2021-01-01 RX ADMIN — Medication 10 ML: at 21:05

## 2021-01-01 RX ADMIN — PHENYLEPHRINE HYDROCHLORIDE 300 MCG/MIN: 10 INJECTION INTRAVENOUS at 10:52

## 2021-01-01 RX ADMIN — DEXAMETHASONE 6 MG: 4 TABLET ORAL at 09:53

## 2021-01-01 RX ADMIN — MULTIPLE VITAMINS W/ MINERALS TAB 1 TABLET: TAB at 08:41

## 2021-01-01 RX ADMIN — ATORVASTATIN CALCIUM 40 MG: 40 TABLET, FILM COATED ORAL at 21:39

## 2021-01-01 RX ADMIN — INSULIN LISPRO 10 UNITS: 100 INJECTION, SOLUTION INTRAVENOUS; SUBCUTANEOUS at 13:09

## 2021-01-01 RX ADMIN — Medication 10 ML: at 06:00

## 2021-01-01 RX ADMIN — FAMOTIDINE 20 MG: 20 TABLET, FILM COATED ORAL at 08:11

## 2021-01-01 RX ADMIN — NOREPINEPHRINE-DEXTROSE IV SOLUTION 4 MG/250ML-5% 30 MCG/MIN: 4-5/250 SOLUTION at 05:09

## 2021-01-01 RX ADMIN — DEXAMETHASONE 6 MG: 4 TABLET ORAL at 08:40

## 2021-01-01 RX ADMIN — MULTIPLE VITAMINS W/ MINERALS TAB 1 TABLET: TAB at 08:22

## 2021-01-01 RX ADMIN — PHENYLEPHRINE HYDROCHLORIDE 30 MCG/MIN: 10 INJECTION INTRAVENOUS at 07:15

## 2021-01-01 RX ADMIN — OXYCODONE HYDROCHLORIDE AND ACETAMINOPHEN 1000 MG: 500 TABLET ORAL at 08:14

## 2021-01-01 RX ADMIN — ASPIRIN 81 MG: 81 TABLET, COATED ORAL at 21:28

## 2021-01-01 RX ADMIN — MULTIPLE VITAMINS W/ MINERALS TAB 1 TABLET: TAB at 08:31

## 2021-01-01 RX ADMIN — INSULIN LISPRO 10 UNITS: 100 INJECTION, SOLUTION INTRAVENOUS; SUBCUTANEOUS at 17:20

## 2021-01-01 RX ADMIN — METOPROLOL TARTRATE 25 MG: 25 TABLET, FILM COATED ORAL at 21:28

## 2021-01-01 RX ADMIN — DEXAMETHASONE 6 MG: 4 TABLET ORAL at 22:15

## 2021-01-01 RX ADMIN — LISINOPRIL 10 MG: 5 TABLET ORAL at 22:04

## 2021-01-01 RX ADMIN — MIDAZOLAM 2 MG/HR: 5 INJECTION INTRAMUSCULAR; INTRAVENOUS at 13:32

## 2021-01-01 RX ADMIN — INSULIN LISPRO 8 UNITS: 100 INJECTION, SOLUTION INTRAVENOUS; SUBCUTANEOUS at 21:03

## 2021-01-01 RX ADMIN — ENOXAPARIN SODIUM 40 MG: 40 INJECTION SUBCUTANEOUS at 16:54

## 2021-01-01 RX ADMIN — FAMOTIDINE 20 MG: 20 TABLET, FILM COATED ORAL at 17:19

## 2021-01-01 RX ADMIN — METOPROLOL TARTRATE 25 MG: 25 TABLET, FILM COATED ORAL at 21:39

## 2021-01-01 RX ADMIN — Medication 220 MG: at 09:30

## 2021-01-01 RX ADMIN — INSULIN LISPRO 2 UNITS: 100 INJECTION, SOLUTION INTRAVENOUS; SUBCUTANEOUS at 11:50

## 2021-01-01 RX ADMIN — ASPIRIN 81 MG: 81 TABLET, COATED ORAL at 21:39

## 2021-01-01 RX ADMIN — INSULIN LISPRO 8 UNITS: 100 INJECTION, SOLUTION INTRAVENOUS; SUBCUTANEOUS at 08:23

## 2021-01-01 RX ADMIN — Medication 10 ML: at 14:28

## 2021-01-01 RX ADMIN — INSULIN LISPRO 8 UNITS: 100 INJECTION, SOLUTION INTRAVENOUS; SUBCUTANEOUS at 17:01

## 2021-01-01 RX ADMIN — SODIUM POLYSTYRENE SULFONATE 15 G: 15 SUSPENSION ORAL; RECTAL at 07:00

## 2021-01-01 RX ADMIN — Medication 10 ML: at 22:00

## 2021-01-01 RX ADMIN — Medication 10 ML: at 14:48

## 2021-01-01 RX ADMIN — INSULIN LISPRO 15 UNITS: 100 INJECTION, SOLUTION INTRAVENOUS; SUBCUTANEOUS at 07:27

## 2021-01-01 RX ADMIN — IOPAMIDOL 75 ML: 755 INJECTION, SOLUTION INTRAVENOUS at 23:31

## 2021-01-01 RX ADMIN — FAMOTIDINE 20 MG: 20 TABLET, FILM COATED ORAL at 08:41

## 2021-01-01 RX ADMIN — DEXAMETHASONE SODIUM PHOSPHATE 6 MG: 10 INJECTION INTRAMUSCULAR; INTRAVENOUS at 14:37

## 2021-01-01 RX ADMIN — MIDAZOLAM HYDROCHLORIDE 2 MG: 1 INJECTION, SOLUTION INTRAMUSCULAR; INTRAVENOUS at 16:06

## 2021-01-01 RX ADMIN — INSULIN LISPRO 6 UNITS: 100 INJECTION, SOLUTION INTRAVENOUS; SUBCUTANEOUS at 12:53

## 2021-01-01 RX ADMIN — Medication 10 ML: at 07:07

## 2021-01-01 RX ADMIN — INSULIN LISPRO 8 UNITS: 100 INJECTION, SOLUTION INTRAVENOUS; SUBCUTANEOUS at 21:40

## 2021-01-01 RX ADMIN — EPINEPHRINE 1 MG: 0.1 INJECTION, SOLUTION ENDOTRACHEAL; INTRACARDIAC; INTRAVENOUS at 17:01

## 2021-01-01 RX ADMIN — INSULIN LISPRO 4 UNITS: 100 INJECTION, SOLUTION INTRAVENOUS; SUBCUTANEOUS at 06:09

## 2021-01-01 RX ADMIN — INSULIN LISPRO 10 UNITS: 100 INJECTION, SOLUTION INTRAVENOUS; SUBCUTANEOUS at 12:01

## 2021-01-01 RX ADMIN — OXYCODONE HYDROCHLORIDE AND ACETAMINOPHEN 1000 MG: 500 TABLET ORAL at 09:31

## 2021-01-01 RX ADMIN — FAMOTIDINE 20 MG: 20 TABLET, FILM COATED ORAL at 17:08

## 2021-01-01 RX ADMIN — INSULIN LISPRO 10 UNITS: 100 INJECTION, SOLUTION INTRAVENOUS; SUBCUTANEOUS at 00:13

## 2021-01-01 RX ADMIN — NOREPINEPHRINE-DEXTROSE IV SOLUTION 4 MG/250ML-5% 30 MCG/MIN: 4-5/250 SOLUTION at 02:20

## 2021-01-01 RX ADMIN — Medication 10 ML: at 14:34

## 2021-01-01 RX ADMIN — VITAMIN D, TAB 1000IU (100/BT) 1 TABLET: 25 TAB at 08:11

## 2021-01-01 RX ADMIN — Medication 220 MG: at 08:14

## 2021-01-01 RX ADMIN — Medication 5 ML: at 13:53

## 2021-01-01 RX ADMIN — Medication 10 ML: at 14:17

## 2021-01-01 RX ADMIN — ENOXAPARIN SODIUM 40 MG: 40 INJECTION SUBCUTANEOUS at 02:36

## 2021-01-01 RX ADMIN — ENOXAPARIN SODIUM 30 MG: 30 INJECTION SUBCUTANEOUS at 09:47

## 2021-01-01 RX ADMIN — EPINEPHRINE 10 MCG/MIN: 1 INJECTION INTRAMUSCULAR; INTRAVENOUS; SUBCUTANEOUS at 16:50

## 2021-01-01 RX ADMIN — ATORVASTATIN CALCIUM 40 MG: 40 TABLET, FILM COATED ORAL at 21:13

## 2021-01-01 RX ADMIN — VITAMIN D, TAB 1000IU (100/BT) 1 TABLET: 25 TAB at 08:22

## 2021-01-01 RX ADMIN — INSULIN LISPRO 6 UNITS: 100 INJECTION, SOLUTION INTRAVENOUS; SUBCUTANEOUS at 16:59

## 2021-01-01 RX ADMIN — INSULIN LISPRO 9 UNITS: 100 INJECTION, SOLUTION INTRAVENOUS; SUBCUTANEOUS at 06:31

## 2021-01-01 RX ADMIN — INSULIN LISPRO 9 UNITS: 100 INJECTION, SOLUTION INTRAVENOUS; SUBCUTANEOUS at 17:01

## 2021-01-01 RX ADMIN — SODIUM BICARBONATE 100 MEQ: 84 INJECTION, SOLUTION INTRAVENOUS at 11:36

## 2021-01-01 RX ADMIN — INSULIN LISPRO 4 UNITS: 100 INJECTION, SOLUTION INTRAVENOUS; SUBCUTANEOUS at 08:17

## 2021-01-01 RX ADMIN — Medication 220 MG: at 08:22

## 2021-01-01 RX ADMIN — MULTIPLE VITAMINS W/ MINERALS TAB 1 TABLET: TAB at 08:14

## 2021-01-01 RX ADMIN — INSULIN LISPRO 6 UNITS: 100 INJECTION, SOLUTION INTRAVENOUS; SUBCUTANEOUS at 21:40

## 2021-01-01 RX ADMIN — INSULIN LISPRO 5 UNITS: 100 INJECTION, SOLUTION INTRAVENOUS; SUBCUTANEOUS at 08:24

## 2021-01-01 RX ADMIN — FENTANYL CITRATE 50 MCG: 50 INJECTION, SOLUTION INTRAMUSCULAR; INTRAVENOUS at 16:16

## 2021-01-01 RX ADMIN — INSULIN GLARGINE 20 UNITS: 100 INJECTION, SOLUTION SUBCUTANEOUS at 21:04

## 2021-01-01 RX ADMIN — INSULIN LISPRO 5 UNITS: 100 INJECTION, SOLUTION INTRAVENOUS; SUBCUTANEOUS at 12:03

## 2021-01-01 RX ADMIN — DEXAMETHASONE 6 MG: 4 TABLET ORAL at 22:50

## 2021-01-01 RX ADMIN — FAMOTIDINE 20 MG: 20 TABLET, FILM COATED ORAL at 17:39

## 2021-01-01 RX ADMIN — INSULIN LISPRO 2 UNITS: 100 INJECTION, SOLUTION INTRAVENOUS; SUBCUTANEOUS at 22:00

## 2021-01-01 RX ADMIN — LISINOPRIL 10 MG: 5 TABLET ORAL at 21:37

## 2021-01-01 RX ADMIN — INSULIN LISPRO 9 UNITS: 100 INJECTION, SOLUTION INTRAVENOUS; SUBCUTANEOUS at 08:34

## 2021-01-01 RX ADMIN — NOREPINEPHRINE BITARTRATE 4 MCG/MIN: 1 INJECTION, SOLUTION, CONCENTRATE INTRAVENOUS at 13:31

## 2021-01-01 RX ADMIN — LISINOPRIL 10 MG: 5 TABLET ORAL at 21:03

## 2021-01-01 RX ADMIN — FAMOTIDINE 20 MG: 20 TABLET, FILM COATED ORAL at 08:26

## 2021-01-01 RX ADMIN — METOPROLOL TARTRATE 25 MG: 25 TABLET, FILM COATED ORAL at 22:53

## 2021-01-01 RX ADMIN — SODIUM CHLORIDE 250 ML: 900 INJECTION, SOLUTION INTRAVENOUS at 10:45

## 2021-01-01 RX ADMIN — INSULIN LISPRO 15 UNITS: 100 INJECTION, SOLUTION INTRAVENOUS; SUBCUTANEOUS at 12:53

## 2021-01-01 RX ADMIN — INSULIN LISPRO 10 UNITS: 100 INJECTION, SOLUTION INTRAVENOUS; SUBCUTANEOUS at 16:46

## 2021-01-01 RX ADMIN — INSULIN LISPRO 10 UNITS: 100 INJECTION, SOLUTION INTRAVENOUS; SUBCUTANEOUS at 21:41

## 2021-01-01 RX ADMIN — ENOXAPARIN SODIUM 40 MG: 40 INJECTION SUBCUTANEOUS at 14:21

## 2021-01-01 RX ADMIN — ATORVASTATIN CALCIUM 40 MG: 40 TABLET, FILM COATED ORAL at 22:00

## 2021-01-01 RX ADMIN — DEXAMETHASONE SODIUM PHOSPHATE 6 MG: 10 INJECTION INTRAMUSCULAR; INTRAVENOUS at 11:01

## 2021-01-01 RX ADMIN — INSULIN LISPRO 15 UNITS: 100 INJECTION, SOLUTION INTRAVENOUS; SUBCUTANEOUS at 07:11

## 2021-01-01 RX ADMIN — Medication 5 ML: at 16:20

## 2021-01-01 RX ADMIN — INSULIN GLARGINE 40 UNITS: 100 INJECTION, SOLUTION SUBCUTANEOUS at 22:00

## 2021-01-01 RX ADMIN — OXYCODONE HYDROCHLORIDE AND ACETAMINOPHEN 1000 MG: 500 TABLET ORAL at 08:12

## 2021-01-01 RX ADMIN — DEXAMETHASONE 6 MG: 4 TABLET ORAL at 08:31

## 2021-01-01 RX ADMIN — METOPROLOL TARTRATE 25 MG: 25 TABLET, FILM COATED ORAL at 22:05

## 2021-01-01 RX ADMIN — INSULIN LISPRO 5 UNITS: 100 INJECTION, SOLUTION INTRAVENOUS; SUBCUTANEOUS at 08:16

## 2021-01-01 RX ADMIN — ASPIRIN 81 MG: 81 TABLET, COATED ORAL at 21:41

## 2021-01-01 RX ADMIN — ENOXAPARIN SODIUM 40 MG: 40 INJECTION SUBCUTANEOUS at 14:48

## 2021-01-01 RX ADMIN — INSULIN LISPRO 11 UNITS: 100 INJECTION, SOLUTION INTRAVENOUS; SUBCUTANEOUS at 07:41

## 2021-01-01 RX ADMIN — VITAMIN D, TAB 1000IU (100/BT) 1 TABLET: 25 TAB at 08:33

## 2021-01-01 RX ADMIN — Medication 10 ML: at 17:39

## 2021-01-01 RX ADMIN — ENOXAPARIN SODIUM 40 MG: 40 INJECTION SUBCUTANEOUS at 05:16

## 2021-01-01 RX ADMIN — METOPROLOL TARTRATE 25 MG: 25 TABLET, FILM COATED ORAL at 21:03

## 2021-01-01 RX ADMIN — INSULIN LISPRO 2 UNITS: 100 INJECTION, SOLUTION INTRAVENOUS; SUBCUTANEOUS at 16:58

## 2021-01-01 RX ADMIN — VITAMIN D, TAB 1000IU (100/BT) 1 TABLET: 25 TAB at 09:30

## 2021-01-01 RX ADMIN — INSULIN LISPRO 4 UNITS: 100 INJECTION, SOLUTION INTRAVENOUS; SUBCUTANEOUS at 06:32

## 2021-01-01 RX ADMIN — INSULIN LISPRO 20 UNITS: 100 INJECTION, SOLUTION INTRAVENOUS; SUBCUTANEOUS at 07:30

## 2021-01-01 RX ADMIN — INSULIN LISPRO 6 UNITS: 100 INJECTION, SOLUTION INTRAVENOUS; SUBCUTANEOUS at 12:03

## 2021-01-01 RX ADMIN — Medication 220 MG: at 08:12

## 2021-01-01 RX ADMIN — DEXAMETHASONE 6 MG: 4 TABLET ORAL at 09:25

## 2021-01-01 RX ADMIN — ASPIRIN 81 MG: 81 TABLET, COATED ORAL at 22:04

## 2021-01-01 RX ADMIN — REMDESIVIR 100 MG: 100 INJECTION, POWDER, LYOPHILIZED, FOR SOLUTION INTRAVENOUS at 11:00

## 2021-01-01 RX ADMIN — PHENYLEPHRINE HYDROCHLORIDE 280 MCG/MIN: 10 INJECTION INTRAVENOUS at 12:18

## 2021-01-01 RX ADMIN — Medication 10 ML: at 13:37

## 2021-01-01 RX ADMIN — ENOXAPARIN SODIUM 40 MG: 40 INJECTION SUBCUTANEOUS at 02:00

## 2021-01-01 RX ADMIN — ASPIRIN 81 MG: 81 TABLET, COATED ORAL at 22:00

## 2021-01-01 RX ADMIN — FAMOTIDINE 20 MG: 20 TABLET, FILM COATED ORAL at 17:37

## 2021-01-01 RX ADMIN — VANCOMYCIN HYDROCHLORIDE 1250 MG: 10 INJECTION, POWDER, LYOPHILIZED, FOR SOLUTION INTRAVENOUS at 10:34

## 2021-01-01 RX ADMIN — Medication 220 MG: at 08:31

## 2021-01-01 RX ADMIN — ATORVASTATIN CALCIUM 40 MG: 40 TABLET, FILM COATED ORAL at 21:03

## 2021-01-01 RX ADMIN — Medication 10 ML: at 06:24

## 2021-01-01 RX ADMIN — REMDESIVIR 100 MG: 100 INJECTION, POWDER, LYOPHILIZED, FOR SOLUTION INTRAVENOUS at 09:01

## 2021-01-01 RX ADMIN — INSULIN LISPRO 4 UNITS: 100 INJECTION, SOLUTION INTRAVENOUS; SUBCUTANEOUS at 12:17

## 2021-01-01 RX ADMIN — DEXAMETHASONE 6 MG: 4 TABLET ORAL at 22:19

## 2021-01-01 RX ADMIN — METOPROLOL TARTRATE 25 MG: 25 TABLET, FILM COATED ORAL at 21:41

## 2021-01-01 RX ADMIN — FAMOTIDINE 20 MG: 20 TABLET, FILM COATED ORAL at 08:31

## 2021-01-01 RX ADMIN — INSULIN LISPRO 6 UNITS: 100 INJECTION, SOLUTION INTRAVENOUS; SUBCUTANEOUS at 21:42

## 2021-01-01 RX ADMIN — LISINOPRIL 10 MG: 5 TABLET ORAL at 21:42

## 2021-01-01 RX ADMIN — Medication 10 ML: at 05:42

## 2021-01-01 RX ADMIN — LISINOPRIL 10 MG: 5 TABLET ORAL at 22:00

## 2021-01-01 RX ADMIN — INSULIN LISPRO 8 UNITS: 100 INJECTION, SOLUTION INTRAVENOUS; SUBCUTANEOUS at 16:20

## 2021-01-01 RX ADMIN — ENOXAPARIN SODIUM 40 MG: 40 INJECTION SUBCUTANEOUS at 17:37

## 2021-01-01 RX ADMIN — MULTIPLE VITAMINS W/ MINERALS TAB 1 TABLET: TAB at 09:30

## 2021-01-01 RX ADMIN — ATORVASTATIN CALCIUM 40 MG: 40 TABLET, FILM COATED ORAL at 22:08

## 2021-01-01 RX ADMIN — Medication 10 ML: at 22:22

## 2021-01-01 RX ADMIN — SODIUM BICARBONATE 100 MEQ: 84 INJECTION, SOLUTION INTRAVENOUS at 16:43

## 2021-01-01 RX ADMIN — FAMOTIDINE 20 MG: 20 TABLET, FILM COATED ORAL at 17:18

## 2021-01-01 RX ADMIN — DEXAMETHASONE 6 MG: 4 TABLET ORAL at 08:11

## 2021-01-01 RX ADMIN — INSULIN LISPRO 9 UNITS: 100 INJECTION, SOLUTION INTRAVENOUS; SUBCUTANEOUS at 12:02

## 2021-01-01 RX ADMIN — REMDESIVIR 100 MG: 100 INJECTION, POWDER, LYOPHILIZED, FOR SOLUTION INTRAVENOUS at 10:45

## 2021-01-01 RX ADMIN — ATORVASTATIN CALCIUM 40 MG: 40 TABLET, FILM COATED ORAL at 22:04

## 2021-01-01 RX ADMIN — Medication 10 ML: at 06:23

## 2021-01-01 RX ADMIN — INSULIN LISPRO 9 UNITS: 100 INJECTION, SOLUTION INTRAVENOUS; SUBCUTANEOUS at 16:52

## 2021-01-01 RX ADMIN — ASPIRIN 81 MG: 81 TABLET, CHEWABLE ORAL at 10:35

## 2021-01-01 RX ADMIN — ENOXAPARIN SODIUM 30 MG: 30 INJECTION SUBCUTANEOUS at 21:00

## 2021-01-01 RX ADMIN — ENOXAPARIN SODIUM 30 MG: 30 INJECTION SUBCUTANEOUS at 13:43

## 2021-01-01 RX ADMIN — ASPIRIN 81 MG: 81 TABLET, COATED ORAL at 22:15

## 2021-01-01 RX ADMIN — FAMOTIDINE 20 MG: 20 TABLET, FILM COATED ORAL at 16:54

## 2021-01-01 RX ADMIN — INSULIN LISPRO 11 UNITS: 100 INJECTION, SOLUTION INTRAVENOUS; SUBCUTANEOUS at 17:00

## 2021-01-01 RX ADMIN — EPINEPHRINE 1 MG: 0.1 INJECTION, SOLUTION ENDOTRACHEAL; INTRACARDIAC; INTRAVENOUS at 16:46

## 2021-01-01 RX ADMIN — ATORVASTATIN CALCIUM 40 MG: 40 TABLET, FILM COATED ORAL at 21:41

## 2021-01-01 RX ADMIN — ALBUTEROL SULFATE 2 PUFF: 108 AEROSOL, METERED RESPIRATORY (INHALATION) at 00:32

## 2021-01-01 RX ADMIN — Medication 10 ML: at 21:44

## 2021-01-01 RX ADMIN — SODIUM CHLORIDE 9.1 UNITS/HR: 9 INJECTION, SOLUTION INTRAVENOUS at 10:51

## 2021-01-01 RX ADMIN — INSULIN LISPRO 10 UNITS: 100 INJECTION, SOLUTION INTRAVENOUS; SUBCUTANEOUS at 22:05

## 2021-01-01 RX ADMIN — INSULIN LISPRO 11 UNITS: 100 INJECTION, SOLUTION INTRAVENOUS; SUBCUTANEOUS at 11:59

## 2021-01-01 RX ADMIN — FAMOTIDINE 20 MG: 20 TABLET, FILM COATED ORAL at 09:53

## 2021-01-01 RX ADMIN — AZITHROMYCIN MONOHYDRATE 500 MG: 500 INJECTION, POWDER, LYOPHILIZED, FOR SOLUTION INTRAVENOUS at 14:21

## 2021-01-01 RX ADMIN — Medication 10 ML: at 21:37

## 2021-01-01 RX ADMIN — ASPIRIN 81 MG: 81 TABLET, COATED ORAL at 21:42

## 2021-01-01 RX ADMIN — Medication 10 ML: at 05:54

## 2021-01-01 RX ADMIN — ROCURONIUM BROMIDE 50 MG: 10 INJECTION, SOLUTION INTRAVENOUS at 13:08

## 2021-01-01 RX ADMIN — VASOPRESSIN 0.1 UNITS/MIN: 20 INJECTION INTRAVENOUS at 01:00

## 2021-01-01 RX ADMIN — ENOXAPARIN SODIUM 40 MG: 40 INJECTION SUBCUTANEOUS at 16:21

## 2021-01-01 RX ADMIN — INSULIN LISPRO 6 UNITS: 100 INJECTION, SOLUTION INTRAVENOUS; SUBCUTANEOUS at 16:25

## 2021-01-01 RX ADMIN — ENOXAPARIN SODIUM 40 MG: 40 INJECTION SUBCUTANEOUS at 13:37

## 2021-01-01 RX ADMIN — INSULIN LISPRO 11 UNITS: 100 INJECTION, SOLUTION INTRAVENOUS; SUBCUTANEOUS at 12:03

## 2021-01-01 RX ADMIN — DEXAMETHASONE 6 MG: 4 TABLET ORAL at 08:14

## 2021-01-01 RX ADMIN — INSULIN LISPRO 4 UNITS: 100 INJECTION, SOLUTION INTRAVENOUS; SUBCUTANEOUS at 12:03

## 2021-01-01 RX ADMIN — INSULIN LISPRO 4 UNITS: 100 INJECTION, SOLUTION INTRAVENOUS; SUBCUTANEOUS at 06:23

## 2021-01-01 RX ADMIN — INSULIN LISPRO 11 UNITS: 100 INJECTION, SOLUTION INTRAVENOUS; SUBCUTANEOUS at 07:46

## 2021-01-01 RX ADMIN — ASPIRIN 81 MG: 81 TABLET, COATED ORAL at 21:03

## 2021-01-01 RX ADMIN — HEPARIN SODIUM 5000 UNITS: 5000 INJECTION INTRAVENOUS; SUBCUTANEOUS at 10:35

## 2021-01-01 RX ADMIN — MULTIPLE VITAMINS W/ MINERALS TAB 1 TABLET: TAB at 08:34

## 2021-01-01 RX ADMIN — ENOXAPARIN SODIUM 40 MG: 40 INJECTION SUBCUTANEOUS at 14:18

## 2021-01-01 RX ADMIN — AMIODARONE HYDROCHLORIDE 300 MG: 50 INJECTION, SOLUTION INTRAVENOUS at 16:54

## 2021-01-01 RX ADMIN — LISINOPRIL 10 MG: 5 TABLET ORAL at 21:41

## 2021-01-01 RX ADMIN — INSULIN LISPRO 8 UNITS: 100 INJECTION, SOLUTION INTRAVENOUS; SUBCUTANEOUS at 06:20

## 2021-01-01 RX ADMIN — Medication 10 ML: at 23:22

## 2021-01-01 RX ADMIN — INSULIN LISPRO 15 UNITS: 100 INJECTION, SOLUTION INTRAVENOUS; SUBCUTANEOUS at 16:25

## 2021-01-01 RX ADMIN — INSULIN LISPRO 4 UNITS: 100 INJECTION, SOLUTION INTRAVENOUS; SUBCUTANEOUS at 16:39

## 2021-01-01 RX ADMIN — NOREPINEPHRINE-DEXTROSE IV SOLUTION 4 MG/250ML-5% 30 MCG/MIN: 4-5/250 SOLUTION at 09:51

## 2021-01-01 RX ADMIN — Medication 5 ML: at 13:36

## 2021-01-01 RX ADMIN — FENTANYL CITRATE 50 MCG/HR: 50 INJECTION, SOLUTION INTRAMUSCULAR; INTRAVENOUS at 13:32

## 2021-01-01 RX ADMIN — Medication 10 ML: at 06:20

## 2021-01-01 RX ADMIN — INSULIN GLARGINE 20 UNITS: 100 INJECTION, SOLUTION SUBCUTANEOUS at 22:06

## 2021-01-01 RX ADMIN — MAGNESIUM SULFATE IN WATER 2 G: 40 INJECTION, SOLUTION INTRAVENOUS at 16:55

## 2021-01-01 RX ADMIN — ETOMIDATE 40 MG: 2 INJECTION INTRAVENOUS at 13:07

## 2021-01-01 RX ADMIN — NOREPINEPHRINE-DEXTROSE IV SOLUTION 4 MG/250ML-5% 4 MCG/MIN: 4-5/250 SOLUTION at 13:31

## 2021-01-01 RX ADMIN — INSULIN LISPRO 10 UNITS: 100 INJECTION, SOLUTION INTRAVENOUS; SUBCUTANEOUS at 23:00

## 2021-01-01 RX ADMIN — FUROSEMIDE 20 MG: 10 INJECTION, SOLUTION INTRAMUSCULAR; INTRAVENOUS at 15:23

## 2021-01-01 RX ADMIN — ENOXAPARIN SODIUM 40 MG: 40 INJECTION SUBCUTANEOUS at 13:52

## 2021-01-01 RX ADMIN — FAMOTIDINE 20 MG: 20 TABLET, FILM COATED ORAL at 08:33

## 2021-01-01 RX ADMIN — GUAIFENESIN AND DEXTROMETHORPHAN 5 ML: 100; 10 SYRUP ORAL at 12:52

## 2021-01-01 RX ADMIN — CEFEPIME HYDROCHLORIDE 2 G: 2 INJECTION, POWDER, FOR SOLUTION INTRAVENOUS at 10:35

## 2021-01-01 RX ADMIN — INSULIN LISPRO 11 UNITS: 100 INJECTION, SOLUTION INTRAVENOUS; SUBCUTANEOUS at 16:58

## 2021-01-01 RX ADMIN — INSULIN LISPRO 2 UNITS: 100 INJECTION, SOLUTION INTRAVENOUS; SUBCUTANEOUS at 12:03

## 2021-01-01 RX ADMIN — Medication 10 ML: at 14:21

## 2021-01-01 RX ADMIN — DEXAMETHASONE 6 MG: 4 TABLET ORAL at 08:33

## 2021-01-01 RX ADMIN — LISINOPRIL 10 MG: 5 TABLET ORAL at 22:52

## 2021-01-01 RX ADMIN — VITAMIN D, TAB 1000IU (100/BT) 1 TABLET: 25 TAB at 08:31

## 2021-01-01 RX ADMIN — Medication 50 MCG/HR: at 03:11

## 2021-01-01 RX ADMIN — INSULIN LISPRO 11 UNITS: 100 INJECTION, SOLUTION INTRAVENOUS; SUBCUTANEOUS at 17:07

## 2021-01-01 RX ADMIN — CEFTRIAXONE 2 G: 2 INJECTION, POWDER, FOR SOLUTION INTRAMUSCULAR; INTRAVENOUS at 11:02

## 2021-01-01 RX ADMIN — OXYCODONE HYDROCHLORIDE AND ACETAMINOPHEN 1000 MG: 500 TABLET ORAL at 08:40

## 2021-01-01 RX ADMIN — DEXAMETHASONE 6 MG: 4 TABLET ORAL at 21:14

## 2021-01-01 RX ADMIN — INSULIN GLARGINE 30 UNITS: 100 INJECTION, SOLUTION SUBCUTANEOUS at 21:11

## 2021-01-01 RX ADMIN — ATORVASTATIN CALCIUM 40 MG: 40 TABLET, FILM COATED ORAL at 21:28

## 2021-01-01 RX ADMIN — PHENYLEPHRINE HYDROCHLORIDE 250 MCG/MIN: 10 INJECTION INTRAVENOUS at 14:46

## 2021-01-01 RX ADMIN — LISINOPRIL 10 MG: 5 TABLET ORAL at 22:14

## 2021-01-01 RX ADMIN — ENOXAPARIN SODIUM 40 MG: 40 INJECTION SUBCUTANEOUS at 01:20

## 2021-01-01 RX ADMIN — SODIUM CHLORIDE 100 ML: 900 INJECTION, SOLUTION INTRAVENOUS at 23:31

## 2021-01-01 RX ADMIN — Medication 10 ML: at 23:31

## 2021-01-01 RX ADMIN — FAMOTIDINE 20 MG: 40 POWDER, FOR SUSPENSION ORAL at 21:00

## 2021-01-01 RX ADMIN — METOPROLOL TARTRATE 25 MG: 25 TABLET, FILM COATED ORAL at 21:42

## 2021-01-01 RX ADMIN — Medication 10 ML: at 06:21

## 2021-01-01 RX ADMIN — INSULIN LISPRO 8 UNITS: 100 INJECTION, SOLUTION INTRAVENOUS; SUBCUTANEOUS at 17:38

## 2021-01-01 RX ADMIN — INSULIN GLARGINE 40 UNITS: 100 INJECTION, SOLUTION SUBCUTANEOUS at 22:07

## 2021-01-01 RX ADMIN — OXYCODONE HYDROCHLORIDE AND ACETAMINOPHEN 1000 MG: 500 TABLET ORAL at 08:34

## 2021-01-01 RX ADMIN — INSULIN LISPRO 4 UNITS: 100 INJECTION, SOLUTION INTRAVENOUS; SUBCUTANEOUS at 21:11

## 2021-01-01 RX ADMIN — ASPIRIN 81 MG: 81 TABLET, COATED ORAL at 21:13

## 2021-01-01 RX ADMIN — OXYCODONE HYDROCHLORIDE AND ACETAMINOPHEN 1000 MG: 500 TABLET ORAL at 08:31

## 2021-01-01 RX ADMIN — REMDESIVIR 100 MG: 100 INJECTION, POWDER, LYOPHILIZED, FOR SOLUTION INTRAVENOUS at 09:16

## 2021-01-01 RX ADMIN — INSULIN LISPRO 5 UNITS: 100 INJECTION, SOLUTION INTRAVENOUS; SUBCUTANEOUS at 17:20

## 2021-01-01 RX ADMIN — Medication 220 MG: at 08:41

## 2021-01-01 RX ADMIN — DEXAMETHASONE 6 MG: 4 TABLET ORAL at 08:26

## 2021-01-01 RX ADMIN — NOREPINEPHRINE BITARTRATE 30 MCG/MIN: 1 INJECTION, SOLUTION, CONCENTRATE INTRAVENOUS at 13:06

## 2021-01-01 RX ADMIN — EPINEPHRINE 1 MG: 0.1 INJECTION, SOLUTION ENDOTRACHEAL; INTRACARDIAC; INTRAVENOUS at 16:54

## 2021-01-01 RX ADMIN — ATRACURIUM BESYLATE 5 MCG/KG/MIN: 10 INJECTION, SOLUTION INTRAVENOUS at 16:24

## 2021-01-01 RX ADMIN — VASOPRESSIN 0.04 UNITS/MIN: 20 INJECTION INTRAVENOUS at 10:38

## 2021-01-01 RX ADMIN — INSULIN LISPRO 4 UNITS: 100 INJECTION, SOLUTION INTRAVENOUS; SUBCUTANEOUS at 06:00

## 2021-01-01 RX ADMIN — INSULIN LISPRO 5 UNITS: 100 INJECTION, SOLUTION INTRAVENOUS; SUBCUTANEOUS at 12:38

## 2021-01-01 RX ADMIN — ATORVASTATIN CALCIUM 40 MG: 40 TABLET, FILM COATED ORAL at 21:42

## 2021-01-01 RX ADMIN — INSULIN LISPRO 4 UNITS: 100 INJECTION, SOLUTION INTRAVENOUS; SUBCUTANEOUS at 22:00

## 2021-01-01 RX ADMIN — PHENYLEPHRINE HYDROCHLORIDE 300 MCG/MIN: 10 INJECTION INTRAVENOUS at 16:55

## 2021-01-01 RX ADMIN — INSULIN GLARGINE 30 UNITS: 100 INJECTION, SOLUTION SUBCUTANEOUS at 21:41

## 2021-01-01 RX ADMIN — INSULIN LISPRO 15 UNITS: 100 INJECTION, SOLUTION INTRAVENOUS; SUBCUTANEOUS at 16:39

## 2021-01-01 RX ADMIN — FAMOTIDINE 20 MG: 20 TABLET, FILM COATED ORAL at 09:31

## 2021-01-01 RX ADMIN — OXYCODONE HYDROCHLORIDE AND ACETAMINOPHEN 1000 MG: 500 TABLET ORAL at 08:22

## 2021-01-01 RX ADMIN — DEXAMETHASONE SODIUM PHOSPHATE 6 MG: 10 INJECTION INTRAMUSCULAR; INTRAVENOUS at 11:36

## 2021-01-01 RX ADMIN — SODIUM BICARBONATE 50 MEQ: 84 INJECTION, SOLUTION INTRAVENOUS at 06:28

## 2021-01-01 RX ADMIN — ATRACURIUM BESYLATE 5 MCG/KG/MIN: 10 INJECTION, SOLUTION INTRAVENOUS at 23:57

## 2021-01-01 RX ADMIN — CALCIUM CHLORIDE 1 G: 100 INJECTION, SOLUTION INTRAVENOUS; INTRAVENTRICULAR at 16:46

## 2021-01-01 RX ADMIN — INSULIN GLARGINE 30 UNITS: 100 INJECTION, SOLUTION SUBCUTANEOUS at 21:12

## 2021-01-01 RX ADMIN — FENTANYL CITRATE 100 MCG: 50 INJECTION, SOLUTION INTRAMUSCULAR; INTRAVENOUS at 13:06

## 2021-01-01 RX ADMIN — VASOPRESSIN 0.01 UNITS/MIN: 20 INJECTION INTRAVENOUS at 21:12

## 2021-01-01 RX ADMIN — INSULIN LISPRO 10 UNITS: 100 INJECTION, SOLUTION INTRAVENOUS; SUBCUTANEOUS at 08:12

## 2021-01-01 RX ADMIN — DEXAMETHASONE 6 MG: 4 TABLET ORAL at 08:22

## 2021-01-01 RX ADMIN — INSULIN LISPRO 11 UNITS: 100 INJECTION, SOLUTION INTRAVENOUS; SUBCUTANEOUS at 11:49

## 2021-01-01 RX ADMIN — Medication 220 MG: at 08:33

## 2021-01-01 RX ADMIN — INSULIN GLARGINE 20 UNITS: 100 INJECTION, SOLUTION SUBCUTANEOUS at 21:40

## 2021-01-01 RX ADMIN — NOREPINEPHRINE-DEXTROSE IV SOLUTION 4 MG/250ML-5% 30 MCG/MIN: 4-5/250 SOLUTION at 12:18

## 2021-01-01 RX ADMIN — ENOXAPARIN SODIUM 40 MG: 40 INJECTION SUBCUTANEOUS at 14:54

## 2021-01-01 RX ADMIN — Medication 10 ML: at 13:43

## 2021-01-01 RX ADMIN — DEXAMETHASONE 6 MG: 4 TABLET ORAL at 09:31

## 2021-01-01 RX ADMIN — INSULIN GLARGINE 25 UNITS: 100 INJECTION, SOLUTION SUBCUTANEOUS at 21:40

## 2021-01-01 RX ADMIN — INSULIN LISPRO 8 UNITS: 100 INJECTION, SOLUTION INTRAVENOUS; SUBCUTANEOUS at 06:15

## 2021-01-01 RX ADMIN — FAMOTIDINE 20 MG: 20 TABLET, FILM COATED ORAL at 08:14

## 2021-01-01 RX ADMIN — LISINOPRIL 10 MG: 5 TABLET ORAL at 21:40

## 2021-01-01 RX ADMIN — FAMOTIDINE 20 MG: 20 TABLET, FILM COATED ORAL at 16:47

## 2021-01-01 RX ADMIN — INSULIN LISPRO 6 UNITS: 100 INJECTION, SOLUTION INTRAVENOUS; SUBCUTANEOUS at 16:52

## 2021-01-01 RX ADMIN — ENOXAPARIN SODIUM 30 MG: 30 INJECTION SUBCUTANEOUS at 09:35

## 2021-01-01 RX ADMIN — VASOPRESSIN 0.1 UNITS/MIN: 20 INJECTION INTRAVENOUS at 05:12

## 2021-01-01 RX ADMIN — FAMOTIDINE 20 MG: 20 TABLET, FILM COATED ORAL at 09:25

## 2021-01-01 RX ADMIN — VITAMIN D, TAB 1000IU (100/BT) 1 TABLET: 25 TAB at 08:14

## 2021-01-01 RX ADMIN — LISINOPRIL 10 MG: 5 TABLET ORAL at 21:39

## 2021-01-01 RX ADMIN — NOREPINEPHRINE-DEXTROSE IV SOLUTION 4 MG/250ML-5% 30 MCG/MIN: 4-5/250 SOLUTION at 07:07

## 2021-01-01 RX ADMIN — FAMOTIDINE 20 MG: 20 TABLET, FILM COATED ORAL at 08:22

## 2021-01-01 RX ADMIN — Medication 10 ML: at 14:54

## 2021-01-01 RX ADMIN — INSULIN LISPRO 4 UNITS: 100 INJECTION, SOLUTION INTRAVENOUS; SUBCUTANEOUS at 07:30

## 2021-01-01 RX ADMIN — EPINEPHRINE 1 MG: 0.1 INJECTION, SOLUTION ENDOTRACHEAL; INTRACARDIAC; INTRAVENOUS at 16:50

## 2021-01-01 RX ADMIN — Medication 10 ML: at 21:11

## 2021-01-01 RX ADMIN — ENOXAPARIN SODIUM 40 MG: 40 INJECTION SUBCUTANEOUS at 05:53

## 2021-01-01 RX ADMIN — INSULIN LISPRO 8 UNITS: 100 INJECTION, SOLUTION INTRAVENOUS; SUBCUTANEOUS at 12:38

## 2021-01-01 RX ADMIN — Medication 10 ML: at 21:28

## 2021-01-01 RX ADMIN — ATORVASTATIN CALCIUM 40 MG: 40 TABLET, FILM COATED ORAL at 22:15

## 2021-01-01 RX ADMIN — REMDESIVIR 200 MG: 100 INJECTION, POWDER, LYOPHILIZED, FOR SOLUTION INTRAVENOUS at 10:36

## 2021-01-01 RX ADMIN — VITAMIN D, TAB 1000IU (100/BT) 1 TABLET: 25 TAB at 08:40

## 2021-01-01 NOTE — PROGRESS NOTES
TRANSFER - IN REPORT: 
 
Verbal report received from [facility] (name) on Eddy Reading  being received from ER (unit) for routine progression of care Report consisted of patients Situation, Background, Assessment and  
Recommendations(SBAR). Information from the following report(s) SBAR, Kardex and ED Summary was reviewed with the receiving nurse. Opportunity for questions and clarification was provided. Assessment completed upon patients arrival to unit and care assumed.

## 2021-01-01 NOTE — ED NOTES
TRANSFER - OUT REPORT: 
 
Verbal report given to [place] RN on Korene Blizzard  being transferred to [phone] for routine progression of care Report consisted of patients Situation, Background, Assessment and  
Recommendations(SBAR). Information from the following report(s) SBAR was reviewed with the receiving nurse. Lines:  
Peripheral IV 01/01/21 Right Forearm (Active) Site Assessment Clean, dry, & intact 01/01/21 1001 Phlebitis Assessment 0 01/01/21 1001 Infiltration Assessment 0 01/01/21 1001 Dressing Status Clean, dry, & intact 01/01/21 1001 Opportunity for questions and clarification was provided. Patient transported with: 
 O2 @ 2 liters

## 2021-01-01 NOTE — H&P
HOSPITALIST H&P/CONSULT 
NAME:  Maurilio Mahoney Age:  70 y.o. 
:   1945 MRN:   [id number] PCP: Michelle Estrada., MD 
Consulting MD: Treatment Team: Attending Provider: Mildred Salguero DO 
HPI:  
Patient is a 70year old CM w/ a PMHx of CAD s/p CABG, DMII presented with SOB. Pt stated that he started having SOB associated with fatigue, myalgias, cough, fever and chills about 5 days ago. He went to the ED 2 days later and was tested for Posive for Covid (). He was sent home on antibiotics at that time as he did not require supplemental oxygen at rest and on exertion. Pt stated in the past several days symptoms have gotten worse. He had a T 102 this AM and checked his O2 sat today which dropped down to the 79%, prompting him to call EMS. He denies peripheral edema, chest pain, abdominal pain, N/V, diarrhea or constipation. In the ED, pt was placed on 2L with O2sat>90%. CBC and CMP at baseline. Procal negative. CXR shows bibasilar atelectasis or pneumonia worse in the interval. Pt was given dexamethasone, azithromycin and Ceftriaxone in ED. Complete ROS done and is as stated in HPI or otherwise negative Past Medical History:  
Diagnosis Date  Arthritis  CAD (coronary artery disease) CABG  - no heart cath since - has not seen a cardiologist since   Diabetes ([facility] [address] type 2, adverage glucose , symptomatic is unknown, unknown A1C level  Full dentures  Gastroesophageal reflux disease 2021  GERD (gastroesophageal reflux disease)   
 controlled with rolaids PRN  
 Hepatitis C   
 2005  High cholesterol  Hypertension  Pneumonia due to COVID-19 virus 2021  Type 2 diabetes mellitus ([address] 2018 Past Surgical History:  
Procedure Laterality Date  HX COLONOSCOPY    
 HX KNEE ARTHROSCOPY Left   
 times 3   
 HX KNEE REPLACEMENT Bilateral   
 HX REFRACTIVE SURGERY Right  DC CARDIAC SURG PROCEDURE UNLIST triple bypass Prior to Admission Medications Prescriptions Last Dose Informant Patient Reported? Taking?  
ascorbic acid (VITAMIN C) 500 mg tablet   Yes No  
Sig: Take 500 mg by mouth daily. Stop seven days prior to surgery per anesthesia protocol. aspirin 81 mg Tab   Yes No  
Sig: Take 81 mg by mouth nightly. atorvastatin (LIPITOR) 40 mg tablet   Yes No  
Sig: Take 40 mg by mouth nightly. cholecalciferol (VITAMIN D3) 1,000 unit cap   Yes No  
Sig: Take 5,000 Units by mouth daily. Stop seven days prior to surgery per anesthesia protocol. doxycycline (VIBRA-TABS) 100 mg tablet   No No  
Sig: Take 1 Tab by mouth two (2) times a day for 7 days. lisinopril (PRINIVIL, ZESTRIL) 10 mg tablet   Yes No  
Sig: Take 10 mg by mouth nightly. metoprolol tartrate (LOPRESSOR) 25 mg tablet   Yes No  
Sig: Take 25 mg by mouth nightly. multivitamin (ONE A DAY) tablet   Yes No  
Sig: Take 1 Tab by mouth daily. Stop seven days prior to surgery per anesthesia protocol. pioglitazone (Actos) 45 mg tablet   Yes No  
Sig: Take  by mouth. 1/2 tab po daily Facility-Administered Medications: None Allergies Allergen Reactions  Codeine Hives Social History Tobacco Use  Smoking status: Never Smoker  Smokeless tobacco: Never Used Substance Use Topics  Alcohol use: Yes Alcohol/week: 4.0 standard drinks Types: 4 Cans of beer per week Family History Problem Relation Age of Onset  Diabetes Mother  Kidney Disease Mother   
     only has one kidney  Cancer Mother   
     liver  Alcohol abuse Father Objective:  
 
Visit Vitals /74 (BP 1 Location: Left arm, BP Patient Position: At rest) Pulse 76 Temp 98.9 °F (37.2 °C) Resp 20 Ht 5' 10\" (1.778 m) Wt 96.6 kg (213 lb) SpO2 95% BMI 30.56 kg/m² Temp (24hrs), Av °F (37.2 °C), Min:98.3 °F (36.8 °C), Max:99.7 °F (37.6 °C) Oxygen Therapy O2 Sat (%): 95 % (21 1523) Pulse via Oximetry: 80 beats per minute (01/01/21 1140) O2 Device: Nasal cannula (01/01/21 1420) O2 Flow Rate (L/min): 2 l/min (01/01/21 1420) Physical Exam: 
General:    Alert, cooperative, no distress, appears stated age. Head:   Normocephalic, without obvious abnormality, atraumatic. Nose:  Nares normal. No drainage or sinus tenderness. Lungs:   B/l rhonchi on lung bases Heart:   Regular rate and rhythm,  no murmur, rub or gallop. Abdomen:   Soft, non-tender. Not distended. Bowel sounds normal.  
Extremities: No cyanosis. No edema. No clubbing Skin:     Texture, turgor normal. No rashes or lesions. Not Jaundiced Neurologic: Alert and oriented x 3, no focal deficits Data Review:  
Recent Results (from the past 24 hour(s)) CBC WITH AUTOMATED DIFF Collection Time: 01/01/21 10:02 AM  
Result Value Ref Range WBC 4.3 4.3 - 11.1 K/uL  
 RBC 3.62 (L) 4.23 - 5.6 M/uL  
 HGB 11.8 (L) 13.6 - 17.2 g/dL HCT 34.2 (L) 41.1 - 50.3 % MCV 94.5 79.6 - 97.8 FL  
 MCH 32.6 26.1 - 32.9 PG  
 MCHC 34.5 31.4 - 35.0 g/dL  
 RDW 13.2 11.9 - 14.6 % PLATELET 673 (L) 056 - 450 K/uL MPV 9.7 9.4 - 12.3 FL ABSOLUTE NRBC 0.00 0.0 - 0.2 K/uL  
 DF AUTOMATED NEUTROPHILS 74 43 - 78 % LYMPHOCYTES 18 13 - 44 % MONOCYTES 7 4.0 - 12.0 % EOSINOPHILS 0 (L) 0.5 - 7.8 % BASOPHILS 0 0.0 - 2.0 % IMMATURE GRANULOCYTES 0 0.0 - 5.0 %  
 ABS. NEUTROPHILS 3.2 1.7 - 8.2 K/UL  
 ABS. LYMPHOCYTES 0.8 0.5 - 4.6 K/UL  
 ABS. MONOCYTES 0.3 0.1 - 1.3 K/UL  
 ABS. EOSINOPHILS 0.0 0.0 - 0.8 K/UL  
 ABS. BASOPHILS 0.0 0.0 - 0.2 K/UL  
 ABS. IMM. GRANS. 0.0 0.0 - 0.5 K/UL METABOLIC PANEL, COMPREHENSIVE Collection Time: 01/01/21 10:02 AM  
Result Value Ref Range Sodium 133 (L) 138 - 145 mmol/L Potassium 4.2 3.5 - 5.1 mmol/L Chloride 104 98 - 107 mmol/L  
 CO2 23 21 - 32 mmol/L Anion gap 6 (L) 7 - 16 mmol/L Glucose 235 (H) 65 - 100 mg/dL  BUN 25 (H) 8 - 23 MG/DL  
 Creatinine 1.51 (H) 0.8 - 1.5 MG/DL  
 GFR est AA 59 (L) >60 ml/min/1.73m2 GFR est non-AA 48 (L) >60 ml/min/1.73m2 Calcium 8.8 8.3 - 10.4 MG/DL Bilirubin, total 0.7 0.2 - 1.1 MG/DL  
 ALT (SGPT) 43 12 - 65 U/L  
 AST (SGOT) 120 (H) 15 - 37 U/L Alk. phosphatase 81 50 - 136 U/L Protein, total 6.8 6.3 - 8.2 g/dL Albumin 3.1 (L) 3.2 - 4.6 g/dL Globulin 3.7 (H) 2.3 - 3.5 g/dL A-G Ratio 0.8 (L) 1.2 - 3.5    
TROPONIN-HIGH SENSITIVITY Collection Time: 01/01/21 10:02 AM  
Result Value Ref Range Troponin-High Sensitivity 16.4 (H) 0 - 14 pg/mL MAGNESIUM Collection Time: 01/01/21 10:02 AM  
Result Value Ref Range Magnesium 1.8 1.8 - 2.4 mg/dL PROTHROMBIN TIME + INR Collection Time: 01/01/21 10:02 AM  
Result Value Ref Range Prothrombin time 13.8 12.5 - 14.7 sec INR 1.0 FERRITIN Collection Time: 01/01/21 10:02 AM  
Result Value Ref Range Ferritin 664 (H) 8 - 388 NG/ML  
LD Collection Time: 01/01/21 10:02 AM  
Result Value Ref Range  (H) 110 - 210 U/L  
D DIMER Collection Time: 01/01/21 10:02 AM  
Result Value Ref Range D DIMER 0.50 <0.56 ug/ml(FEU) C REACTIVE PROTEIN, QT Collection Time: 01/01/21 10:02 AM  
Result Value Ref Range C-Reactive protein 10.5 (H) 0.0 - 0.9 mg/dL PROCALCITONIN Collection Time: 01/01/21 10:02 AM  
Result Value Ref Range Procalcitonin 0.10 ng/mL LACTIC ACID Collection Time: 01/01/21 10:04 AM  
Result Value Ref Range Lactic acid 1.6 0.4 - 2.0 MMOL/L  
EKG, 12 LEAD, INITIAL Collection Time: 01/01/21 10:18 AM  
Result Value Ref Range Ventricular Rate 79 BPM  
 Atrial Rate 79 BPM  
 P-R Interval 126 ms  
 QRS Duration 98 ms Q-T Interval 352 ms QTC Calculation (Bezet) 403 ms Calculated P Axis 1 degrees Calculated R Axis -16 degrees Calculated T Axis -5 degrees Diagnosis Normal sinus rhythm Nonspecific ST abnormality When compared with ECG of 29-DEC-2020 22:33, 
 Premature atrial complexes are no longer Present Confirmed by Vignesh Evans ([id number]) on 1/1/2021 2:52:35 PM 
  
FIBRINOGEN Collection Time: 01/01/21  3:08 PM  
Result Value Ref Range Fibrinogen 608 (H) 190 - 501 mg/dL HEMOGLOBIN A1C WITH EAG Collection Time: 01/01/21  3:08 PM  
Result Value Ref Range Hemoglobin A1c 9.8 (H) 4.8 - 6.0 % Est. average glucose 235 mg/dL GLUCOSE, POC Collection Time: 01/01/21  3:25 PM  
Result Value Ref Range Glucose (POC) 393 (H) 65 - 100 mg/dL Imaging Cindy Torres Plain XR CHEST PORT Final Result IMPRESSION: Bibasilar atelectasis or pneumonia worse in the interval.  
  
 
 
Assessment and Plan: Active Hospital Problems Diagnosis Date Noted  Acute respiratory failure with hypoxia ([facility] [address] 01/01/2021  Pneumonia due to COVID-19 virus 01/01/2021  Mixed hyperlipidemia 01/01/2021  Gastroesophageal reflux disease 01/01/2021  Stage 3a chronic kidney disease 01/01/2021  Other chest pain 04/27/2020  Coronary artery disease involving native coronary artery of native heart with angina pectoris ([facility] [address] 04/21/2020 [facility]. Abn ETT led to 3v CABG. 1990s?  Type 2 diabetes mellitus ([facility] [address] 07/23/2018  Essential hypertension 09/15/2010 Last Assessment & Plan:  
Patient is stable on medications PLAN: 
 
Acute respiratory failure with hypoxia 2/2 Covid-19 Pneumonia O2 sat at home 79% on RA and placed on 2L NC in ED. COVID POSITIVE on 12/30. CXR shows b/l atelectasis vs pneumonia. Procal negative. D-Dimer negative. Convalescent plasma ordered--pt agreeable Dexamethasone for 10 days (1/1-1/10) Remdesivir for 5 days (1/1-1/5) No abx as low suspicion for superimposed bacterial infection with negative procal 
Zinc/vitamin C daily Added IS and albuterol prn Prone as tolerated SSI while on steroids Oxygen supplementation. Wean O2 as tolerated F/u D-Dimer and other covid labs. Monitor renal and hepatic function while on remdesivir. CAD s/p CABG: Cont home ASA, statin, lisinopril metoprolol T2DM: hold home Actos. On SSI w/ POC BG qAC/HS 
HTN: Cont home Lisinopril and metoprolol GERD: Pepcid BID Stage 3 CKD: Cr at baseline HLD: Cont home statin Vit D Def: Cont home supplements DVT PPx: Lovenox SQ Code Status: FULL Anticipated discharge: >2 midnights. PT/OT ordered. Signed By: Ranjana Ragsdale DO   
 January 1, 2021

## 2021-01-01 NOTE — ED PROVIDER NOTES
47-year-old male with a history of coronary artery disease and CABG, diabetes, GERD, high cholesterol, hypertension presents with hypoxia from Covid. He has been sick for about a week. He was diagnosed on December 30. His home O2 saturation was 79%. He had a fever this morning of 102. He reports generalized weakness, poor appetite, and hacking cough. Denies shortness of breath or chest pain. He has mild diarrhea. No vomiting. Past Medical History:  
Diagnosis Date  Arthritis  CAD (coronary artery disease) CABG 1992 - no heart cath since - has not seen a cardiologist since 2008  Diabetes ([facility] [address] type 2, adverage glucose , symptomatic is unknown, unknown A1C level  Full dentures  GERD (gastroesophageal reflux disease)   
 controlled with rolaids PRN  
 Hepatitis C   
 2005  High cholesterol  Hypertension Past Surgical History:  
Procedure Laterality Date  CARDIAC SURG PROCEDURE UNLIST    
 triple bypass  HX COLONOSCOPY    
 HX KNEE ARTHROSCOPY Left   
 times 3   
 HX KNEE REPLACEMENT Bilateral   
 HX REFRACTIVE SURGERY Right Family History:  
Problem Relation Age of Onset  Diabetes Mother  Kidney Disease Mother   
     only has one kidney  Cancer Mother   
     liver  Alcohol abuse Father Social History Socioeconomic History  Marital status:  Spouse name: Not on file  Number of children: Not on file  Years of education: Not on file  Highest education level: Not on file Occupational History  Not on file Social Needs  Financial resource strain: Not on file  Food insecurity Worry: Not on file Inability: Not on file  Transportation needs Medical: Not on file Non-medical: Not on file Tobacco Use  Smoking status: Never Smoker  Smokeless tobacco: Never Used Substance and Sexual Activity  Alcohol use: Yes Alcohol/week: 4.0 standard drinks Types: 4 Cans of beer per week  Drug use: No  
 Sexual activity: Not on file Lifestyle  Physical activity Days per week: Not on file Minutes per session: Not on file  Stress: Not on file Relationships  Social connections Talks on phone: Not on file Gets together: Not on file Attends Sikh service: Not on file Active member of club or organization: Not on file Attends meetings of clubs or organizations: Not on file Relationship status: Not on file  Intimate partner violence Fear of current or ex partner: Not on file Emotionally abused: Not on file Physically abused: Not on file Forced sexual activity: Not on file Other Topics Concern  Not on file Social History Narrative  Not on file ALLERGIES: Codeine Review of Systems Constitutional: Positive for fatigue and fever. Negative for chills. HENT: Negative for hearing loss. Eyes: Negative for visual disturbance. Respiratory: Positive for cough. Negative for shortness of breath. Cardiovascular: Negative for chest pain and palpitations. Gastrointestinal: Positive for diarrhea. Negative for abdominal pain, nausea and vomiting. Musculoskeletal: Negative for back pain. Skin: Negative for rash. Neurological: Negative for weakness and headaches. Psychiatric/Behavioral: Negative for confusion. Vitals:  
 01/01/21 9074 BP: 134/70 Pulse: 84 Resp: 18 Temp: 99.7 °F (37.6 °C) SpO2: 94% Weight: 96.6 kg (213 lb) Height: 5' 10\" (1.778 m) Physical Exam 
Vitals signs and nursing note reviewed. Constitutional:   
   Appearance: He is well-developed. HENT:  
   Head: Normocephalic and atraumatic. Eyes:  
   Pupils: Pupils are equal, round, and reactive to light. Neck: Musculoskeletal: Normal range of motion and neck supple. Cardiovascular:  
   Rate and Rhythm: Regular rhythm. Pulmonary: Effort: Pulmonary effort is normal.  
Abdominal:  
   Palpations: Abdomen is soft. Tenderness: There is no abdominal tenderness. Musculoskeletal: Normal range of motion. Skin: 
   General: Skin is warm and dry. Neurological:  
   Mental Status: He is alert. Psychiatric:     
   Behavior: Behavior normal.  
 
  
 
MDM Number of Diagnoses or Management Options Diagnosis management comments: Parts of this document were created using dragon voice recognition software. The chart has been reviewed but errors may still be present. I wore appropriate PPE throughout this patient's ED visit. Inez Rodrigues MD, 10:08 AM 
 
10:36 AM 
Hypoxic with worsening infiltrates. Will discuss with hospitalist for admission. Amount and/or Complexity of Data Reviewed Clinical lab tests: ordered and reviewed (Results for orders placed or performed during the hospital encounter of 01/01/21 
-CBC WITH AUTOMATED DIFF Result                      Value             Ref Range WBC                         4.3               4.3 - 11.1 K* 
     RBC                         3.62 (L)          4.23 - 5.6 M* HGB                         11.8 (L)          13.6 - 17.2 * HCT                         34.2 (L)          41.1 - 50.3 % MCV                         94.5              79.6 - 97.8 * MCH                         32.6              26.1 - 32.9 * MCHC                        34.5              31.4 - 35.0 * RDW                         13.2              11.9 - 14.6 % PLATELET                    134 (L)           150 - 450 K/* MPV                         9.7               9.4 - 12.3 FL ABSOLUTE NRBC               0.00              0.0 - 0.2 K/* DF                          AUTOMATED NEUTROPHILS                 74                43 - 78 % LYMPHOCYTES                 18                13 - 44 % MONOCYTES                   7                 4.0 - 12.0 % EOSINOPHILS                 0 (L)             0.5 - 7.8 % BASOPHILS                   0                 0.0 - 2.0 % IMMATURE GRANULOCYTES       0                 0.0 - 5.0 %   
     ABS. NEUTROPHILS            3.2               1.7 - 8.2 K/* ABS. LYMPHOCYTES            0.8               0.5 - 4.6 K/* ABS. MONOCYTES              0.3               0.1 - 1.3 K/* ABS. EOSINOPHILS            0.0               0.0 - 0.8 K/* ABS. BASOPHILS              0.0               0.0 - 0.2 K/* ABS. IMM. GRANS.            0.0               0.0 - 0.5 K/* 
) Tests in the radiology section of CPT®: ordered and reviewed (Xr Chest [facility] Result Date: 1/1/2021 EXAM: XR CHEST PORT INDICATION: cough COMPARISON: 12/29/2020 FINDINGS: A portable AP radiograph of the chest was obtained at 015 hours. The patient is on a cardiac monitor. Bibasilar airspace disease worse in the interval. The cardiac and mediastinal contours and pulmonary vascularity are normal.  Median sternotomy. IMPRESSION: Bibasilar atelectasis or pneumonia worse in the interval. ) Tests in the medicine section of CPT®: ordered and reviewed Procedures

## 2021-01-01 NOTE — ED TRIAGE NOTES
Pt arrived via [facility] from home c/o COVID positive as of 12/29 ad told to come back if O2 sat drops below 90. Pt reports RA sat at 79%. Pt on 2L NC for sat at 94%. C/o fever, dry cough, body aches.  Denies [place], cp, NVD

## 2021-01-01 NOTE — PROGRESS NOTES
Pt admitted to floor from ER, report received from Jeimy Mario RN. Pt in bed resting quietly, pt alert and oriented. RR even, unlabored, no noted distress, pt on 2L via NC intact. Pt oriented to room and bed functions, pt verbalized understanding. Pt denies needs. Bed L/L, call bell is within reach and side rails are up x 2.

## 2021-01-02 NOTE — PROGRESS NOTES
ACUTE OT GOALS: 
(Developed with and agreed upon by patient and/or caregiver.) 1) Patient will complete lower body bathing and dressing with modified independence and adaptive equipment as needed. 2) Patient will complete toileting with modified independence. 3) Patient will complete functional transfers with modified independence and adaptive equipment as needed. 4) Patient will tolerate at least 20 minutes of OT activity with as needed rest breaks while maintaining O2 sats >90%. 5) Patient will verbalize at least 3 energy conservation technique to utilize during ADL/IADL. Timeframe: 7 visits OCCUPATIONAL THERAPY ASSESSMENT: Initial Assessment and Daily Note OT Treatment Day # 1 Walker Barker is a 70 y.o. male PRIMARY DIAGNOSIS: Pneumonia due to COVID-19 virus [facility] [J96.01] Pneumonia due to COVID-19 virus [U07.1, J12.82] Reason for Referral:  Decreased activity tolerance due to COVID19 ICD-10: Treatment Diagnosis: Generalized Muscle Weakness (M62.81) INPATIENT: Payor: [employer] / Plan: [place] [employer] / Product Type: Managed Care Medicaid /  
ASSESSMENT:  
 
REHAB RECOMMENDATIONS:  
Recommendation to date pending progress: 
Setting: ? Home Health Therapy Equipment: ? To Be Determined PRIOR LEVEL OF FUNCTION: 
(Prior to Hospitalization)  INITIAL/CURRENT LEVEL OF FUNCTION: 
(Most Recently Demonstrated) Bathing: ? Independent Dressing: ? Independent Feeding/Grooming: ? Independent Toileting: ? Independent Functional Mobility: 
? Independent Bathing: ? Minimal Assistance Dressing: ? Minimal Assistance Feeding/Grooming: 
? Set Up Toileting: ? Minimal Assistance Functional Mobility: 
? Standby Assistance ASSESSMENT: 
 Mr. Angel Mullins is a previously functionally independent 70year old male admitted with fever and hypoxia due to COVID-19 pneumonia. Patient mobilizes well, but desats to 80% on 4L when showing me some of his morning exercise routine in standing. Able to bring sats up to >90% on 6L O2 with seated rest break and instruction on pursed lip breathing. Decreased activity tolerance due to respiratory status is biggest limiting factor currently. SUBJECTIVE:  
Mr. Angel Mullins states, \"I do these leg kicks every morning. \" SOCIAL HISTORY/LIVING ENVIRONMENT: Lives with wife, independent with ADLs, active. OBJECTIVE:  
 
PAIN: VITAL SIGNS: LINES/DRAINS:  
Pre Treatment: Pain Screen Pain Scale 1: Numeric (0 - 10) Pain Intensity 1: 0 Post Treatment: none Vital Signs O2 Sat (%): 90 % O2 Device: Nasal cannula O2 Flow Rate (L/min): 6 l/min None O2 Device: Nasal cannula GROSS EVALUATION: 
BUE Within Functional Limits Abnormal/ Functional Abnormal/ Non-Functional (see comments) Not Tested Comments: AROM [x] [] [] [] PROM [] [] [] [x] Strength [x] [] [] [] Balance [] [x] [] [] Intact sitting Intact static standing Fair + higher level dynamic standing Posture [x] [] [] [] Sensation [] [] [] [x] Coordination [x] [] [] [] Tone [] [] [] [x] Edema [] [] [] [x] Activity Tolerance [] [] [x] [] Desats to 80% on 4L with higher level standing activity   
 [] [] [] [] COGNITION/ 
PERCEPTION: Intact Impaired  
(see comments) Comments:  
Orientation [x] [] Vision [x] [] Hearing [x] [] Judgment/ Insight [] [] Attention [] [] Memory [] [] Command Following [] [] Emotional Regulation [] []   
 [] [] ACTIVITIES OF DAILY LIVING: I Mod I S SBA CGA Min Mod Max Total NT Comments BASIC ADLs:             
Bathing/ Showering [] [] [] [] [] [x] [] [] [] [] Toileting [] [] [] [] [] [x] [] [] [] [] Dressing [] [] [x] [] [] [x] [] [] [] [] Setup for socks while seated EOB Feeding [] [] [x] [] [] [] [] [] [] []   
Grooming [] [] [x] [] [] [] [] [] [] [] Personal Device Care [] [] [x] [] [] [] [] [] [] [] Functional Mobility [] [] [] [x] [] [] [] [] [] [] Bed mobility supervision, sit to stand SBA INSTRUMENTAL ADLs:             
Care of Others [] [] [] [] [] [] [] [] [] [] Community Mobility [] [] [] [] [] [] [] [] [] [] Financial Management [] [] [] [] [] [] [] [] [] [] Home Management [] [] [] [] [] [] [] [] [] [] Meal Preparation [] [] [] [] [] [] [] [] [] [] Health Management [] [] [] [] [] [] [] [] [] []   
Work/Leisure [] [] [] [] [] [] [] [] [] [] I=Independent, Mod I=Modified Independent, S=Supervision, SBA=Standby Assistance, CGA=Contact Guard Assistance,  
Min=Minimal Assistance, Mod=Moderate Assistance, Max=Maximal Assistance, Total=Total Assistance, NT=Not Tested MOBILITY: I Mod I S SBA CGA Min Mod Max Total  NT x2 Comments:  
Supine to sit [] [] [x] [] [] [] [] [] [] [] [] Sit to supine [] [] [] [] [] [] [] [] [] [x] [] Sit to stand [] [] [] [x] [] [] [] [] [] [] [] Bed to chair [] [] [] [] [x] [] [] [] [] [] [] I=Independent, Mod I=Modified Independent, S=Supervision, SBA=Standby Assistance, CGA=Contact Guard Assistance,  
Min=Minimal Assistance, Mod=Moderate Assistance, Max=Maximal Assistance, Total=Total Assistance, NT=Not Tested [place] Daily Activity Inpatient Short Form How much help from another person does the patient currently need. .. Total A Lot A Little None 1. Putting on and taking off regular lower body clothing? [] 1   [] 2   [x] 3   [] 4  
2. Bathing (including washing, rinsing, drying)? [] 1   [] 2   [x] 3   [] 4  
3. Toileting, which includes using toilet, bedpan or urinal?   [] 1   [] 2   [x] 3   [] 4  
4. Putting on and taking off regular upper body clothing?    [] 1   [] 2   [x] 3   [] 4  
 5. Taking care of personal grooming such as brushing teeth? [] 1   [] 2   [x] 3   [] 4  
6. Eating meals? [] 1   [] 2   [x] 3   [] 4  
© 2007, Trustees of [address] [place] [address] [address] [id number], under license to [employer]. All rights reserved Score:  Initial: 18 Most Recent: X (Date: -- ) Interpretation of Tool:  Represents activities that are increasingly more difficult (i.e. Bed mobility, Transfers, Gait). PLAN:  
FREQUENCY/DURATION: OT Plan of Care: 3 times/week for duration of hospital stay or until stated goals are met, whichever comes first. 
 
PROBLEM LIST:  
(Skilled intervention is medically necessary to address:) 1. Decreased ADL/Functional Activities 2. Decreased Activity Tolerance 3. Decreased Balance 4. Decreased Coordination 5. Decreased Transfer Abilities INTERVENTIONS PLANNED:  
(Benefits and precautions of occupational therapy have been discussed with the patient.) 1. Self Care Training 2. Therapeutic Activity 3. Therapeutic Exercise/HEP 4. Neuromuscular Re-education 5. Education TREATMENT:  
 
EVALUATION: Low Complexity : (Untimed Charge) TREATMENT:  
($$ Self Care/Home Management: 8-22 mins    ) Self Care (10 minutes Minutes): Self care including Lower Body Dressing and functional mobility and standing activity (morning exercise routine)  to increase independence and increase activity tolerance. AFTER TREATMENT POSITION/PRECAUTIONS: 
Chair, Needs within reach and RN notified INTERDISCIPLINARY COLLABORATION: 
RN/PCT and OT/MARTIN TOTAL TREATMENT DURATION: 
OT Patient Time In/Time Out Time In: 1140 Time Out: 5716 JERMAN Pruitt/BLANE

## 2021-01-02 NOTE — PROGRESS NOTES
Shift Assessment. Pt is A&O x 4. Pt cardiac rhythm is regular. Pt lungs are coarse. Pt is on 4L via nasal cannula. Bowel sounds present. Pt denies pain or nausea. Pt is resting in bed with the bed in the lowest position with the call light within reach.

## 2021-01-02 NOTE — PROGRESS NOTES
Hospitalist Progress Note 2021 Admit Date: 2021  9:55 AM  
NAME: Jane Leyva :  1945 MRN:  [id number] Attending: Myrtle Navarro DO 
PCP:  Jhonny Reyna MD 
 
SUBJECTIVE:  
Patient is a 70year old CM w/ a PMHx of CAD s/p CABG, DMII presented with SOB. Pt stated that he started having SOB associated with fatigue, myalgias, cough, fever and chills about 5 days ago. He went to the ED 2 days later and was tested for Posive for Covid (). He was sent home on antibiotics at that time as he did not require supplemental oxygen at rest and on exertion. Pt stated in the past several days symptoms have gotten worse. He had a T 102 PTA and checked his O2 sat which dropped down to the 79%, prompting him to call EMS. In the ED, pt was placed on 2L with O2sat>90%. CBC and CMP at baseline. Procal negative. CXR shows bibasilar atelectasis or pneumonia worse in the interval. Pt was given dexamethasone, azithromycin and Ceftriaxone in ED. This Am pt stated his breathing is stable but still get SOB with exertion. In bed resting w/o complaints. Cough has gotten better. He denies peripheral edema, chest pain, abdominal pain, N/V, diarrhea or constipation. Review of Systems negative with exception of pertinent positives noted above PHYSICAL EXAM  
 
Visit Vitals /68 (BP 1 Location: Left arm, BP Patient Position: At rest) Pulse 62 Temp 97.7 °F (36.5 °C) Resp 18 Ht 5' 10\" (1.778 m) Wt 96.6 kg (213 lb) SpO2 90% BMI 30.56 kg/m² Temp (24hrs), Av.5 °F (36.9 °C), Min:97.7 °F (36.5 °C), Max:99.6 °F (37.6 °C) Oxygen Therapy O2 Sat (%): 90 % (21 1455) Pulse via Oximetry: 80 beats per minute (21 1140) O2 Device: Nasal cannula (21 1141) O2 Flow Rate (L/min): 6 l/min (21 1141) No intake or output data in the 24 hours ending 21 1623 General: No acute distress Lungs:  Coarse lung sounds Bilaterally. Heart:  Regular rate and rhythm,  No murmur, rub, or gallop Abdomen: Soft, Non distended, Non tender, Positive bowel sounds Extremities: No cyanosis, clubbing or edema Neurologic:  No focal deficits XR CHEST PORT Final Result IMPRESSION: Bibasilar atelectasis or pneumonia worse in the interval.  
  
 
 
 
ASSESSMENT Active Hospital Problems Diagnosis Date Noted  Acute respiratory failure with hypoxia ([facility] [address] 01/01/2021  Pneumonia due to COVID-19 virus 01/01/2021  Mixed hyperlipidemia 01/01/2021  Gastroesophageal reflux disease 01/01/2021  Stage 3a chronic kidney disease 01/01/2021  Vitamin D deficiency 01/01/2021  Other chest pain 04/27/2020  Coronary artery disease involving native coronary artery of native heart with angina pectoris ([facility] [address] 04/21/2020 [facility]. Abn ETT led to 3v CABG. 1990s?  Type 2 diabetes mellitus ([facility] [address] 07/23/2018  Essential hypertension 09/15/2010 Last Assessment & Plan:  
Patient is stable on medications Plan: 
 
Acute respiratory failure with hypoxia 2/2 Covid-19 Pneumonia O2 sat at home 79% on RA and placed on 2L NC in ED. COVID POSITIVE on 12/30. CXR shows b/l atelectasis vs pneumonia. Procal negative. D-Dimer negative. Convalescent plasma ordered--pt agreeable Dexamethasone for 10 days (1/1-1/10) Remdesivir for 5 days (1/1-1/5) No abx as low suspicion for superimposed bacterial infection with negative procal 
Zinc/vitamin C daily Added IS and albuterol prn Prone as tolerated SSI while on steroids. Added basal 
Oxygen supplementation--4L this Am. Wean O2 as tolerated F/u D-Dimer and other covid labs. Monitor renal and hepatic function while on remdesivir. Elevated LFT: AST>ALT (wnl). Most likely secondary to vial infection. Improving. Hold Remdesivir if ALT>250. CAD s/p CABG: Cont home ASA, statin, lisinopril metoprolol T2DM: hold home Actos. On SSI w/ POC BG qAC/HS. Added lantus. HTN: Cont home Lisinopril and metoprolol GERD: Pepcid BID Stage 3 CKD: Cr at baseline HLD: Cont home statin Vit D Def: Cont home supplements 
  
DVT PPx: Lovenox SQ Code Status: FULL 
  
Anticipated discharge: Pending clinical course as still on high O2. PT/OT ordered. Signed By: Desi Dumas DO   
 January 2, 2021

## 2021-01-02 NOTE — PROGRESS NOTES
ACUTE PHYSICAL THERAPY GOALS: 
(Developed with and agreed upon by patient and/or caregiver.) STG: 
(1.)Mr. Harsh King will move from supine to sit and sit to supine , scoot up and down and roll side to side with SUPERVISION within 4 treatment day(s). (2.)Mr. Harsh King will transfer from bed to chair and chair to bed with SUPERVISION using the least restrictive device within 4 treatment day(s). (3.)Mr. Harsh King will ambulate with SUPERVISION for 100 feet with the least restrictive device within 4 treatment day(s). LTG: 
(1.)Mr. Harsh King will move from supine to sit and sit to supine , scoot up and down and roll side to side in bed with MODIFIED INDEPENDENCE within 7 treatment day(s). (2.)Mr. Harsh King will transfer from bed to chair and chair to bed with MODIFIED INDEPENDENCE using the least restrictive device within 7 treatment day(s). (3.)Mr. Harsh King will ambulate with MODIFIED INDEPENDENCE for 200 feet with the least restrictive device within 7 treatment day(s). ________________________________________________________________________________________________ PHYSICAL THERAPY ASSESSMENT: Initial Assessment and PM PT Treatment Day # 1 Carmen Dyer is a 70 y.o. male PRIMARY DIAGNOSIS: Pneumonia due to COVID-19 virus Acute respiratory failure with hypoxia ([facility] [J96.01] Pneumonia due to COVID-19 virus [U07.1, J12.82] Reason for Referral: ICD-10: Treatment Diagnosis: Generalized Muscle Weakness (M62.81) Other lack of cordination (R27.8) Difficulty in walking, Not elsewhere classified (R26.2) Other abnormalities of gait and mobility (R26.89) INPATIENT: Payor: [employer] / [place] [employer] / Product Type: Managed Care Medicaid /  
 
ASSESSMENT:  
 
REHAB RECOMMENDATIONS:  
Recommendation to date pending progress: 
Setting: 
? No further skilled therapy Equipment:  
? None PRIOR LEVEL OF FUNCTION: 
 (Prior to Hospitalization) INITIAL/CURRENT LEVEL OF FUNCTION: 
(Most Recently Demonstrated) Bed Mobility: 
? Independent Sit to Stand: ? Independent Transfers: 
? Independent Gait/Mobility: 
? Independent Bed Mobility: 
? Contact Guard Assistance Sit to Stand: 
? Contact Guard Assistance Transfers: 
? [address] Gait/Mobility: 
? [address] ASSESSMENT: 
Mr. Monie Godwin is a pleasant elderly male with above diagnosis who demonstrates with decreased transfers, ambulation and mobility below his prior functional baseline. All transfers are currently limited at [facility] x 1 out of bed to sit and stand followed by ambulation with for 40 feet with the deficits stated below. His standing balance is fair to good. Hussain Solorzano then returns to seated edge of bed before return to supine with [facility] x 1. Skilled PT is indicated for this patient's functional mobility deficits. SUBJECTIVE:  
Mr. Monie Godwin states, \"Other than oxygen I am moving fine, but short of breath sometimes. \" SOCIAL HISTORY/LIVING ENVIRONMENT: Live with his wife at home prior with no limitations. OBJECTIVE:  
 
PAIN: VITAL SIGNS: LINES/DRAINS:  
Pre Treatment: Pain Screen Pain Scale 1: Numeric (0 - 10) Pain Intensity 1: 0 Post Treatment: 0/10 no pain reported. no other current assistive device. O2 Device: Nasal cannula GROSS EVALUATION: 
 Within Functional Limits Abnormal/ Functional Abnormal/ Non-Functional (see comments) Not Tested Comments: AROM [] [x] [] [] PROM [] [x] [] [] Strength [] [x] [] [] Balance [] [x] [] [] Posture [] [x] [] [] Sensation [] [x] [] [] Coordination [] [x] [] [] Tone [] [x] [] [] Edema [] [x] [] [] Activity Tolerance [] [x] [] []   
 [] [] [] [] COGNITION/ 
PERCEPTION: Intact Impaired  
(see comments) Comments:  
Orientation [x] [] Vision [x] [] Hearing [x] [] Command Following [x] [] Safety Awareness [x] [] [x] [] MOBILITY: I Mod I S SBA CGA Min Mod Max Total  NT x2 Comments:  
Bed Mobility Rolling [] [] [] [] [x] [] [] [] [] [] [] Supine to Sit [] [] [] [] [x] [] [] [] [] [] [] Scooting [] [] [] [] [x] [] [] [] [] [] [] Sit to Supine [] [] [] [] [x] [] [] [] [] [] []   
Transfers Sit to Stand [] [] [] [] [x] [] [] [] [] [] [] Bed to Chair [] [] [] [] [x] [] [] [] [] [] []   
Stand to Sit [] [] [] [] [x] [] [] [] [] [] [] I=Independent, Mod I=Modified Independent, S=Supervision, SBA=Standby Assistance, CGA=Contact Guard Assistance,  
Min=Minimal Assistance, Mod=Moderate Assistance, Max=Maximal Assistance, Total=Total Assistance, NT=Not Tested GAIT: I Mod I S SBA CGA Min Mod Max Total  NT x2 Comments:  
Level of Assistance [] [] [] [] [x] [] [] [] [] [] [] Distance 40 feet on 6 liters 02 nasal cannula. DME None Gait Quality Good dynamic balance and stability. I=Independent, Mod I=Modified Independent, S=Supervision, SBA=Standby Assistance, CGA=Contact Guard Assistance,  
Min=Minimal Assistance, Mod=Moderate Assistance, Max=Maximal Assistance, Total=Total Assistance, NT=Not Tested [facility] Basic Mobility Inpatient Short Form How much difficulty does the patient currently have. .. Unable A Lot A Little None 1. Turning over in bed (including adjusting bedclothes, sheets and blankets)? [] 1   [] 2   [] 3   [x] 4  
2. Sitting down on and standing up from a chair with arms ( e.g., wheelchair, bedside commode, etc.)   [] 1   [] 2   [] 3   [x] 4  
3. Moving from lying on back to sitting on the side of the bed? [] 1   [] 2   [] 3   [x] 4 How much help from another person does the patient currently need. .. Total A Lot A Little None 4. Moving to and from a bed to a chair (including a wheelchair)? [] 1   [] 2   [] 3   [x] 4  
5. Need to walk in hospital room? [] 1   [] 2   [] 3   [x] 4 6.  Climbing 3-5 steps with a railing? [] 1   [] 2   [x] 3   [] 4  
© 2007, Trustees of [facility] [employer], under license to [employer]. All rights reserved Score:  Initial:  23 Most Recent: X (Date: -- ) Interpretation of Tool:  Represents activities that are increasingly more difficult (i.e. Bed mobility, Transfers, Gait). PLAN:  
FREQUENCY/DURATION: PT Plan of Care: 3 times/week for duration of hospital stay or until stated goals are met, whichever comes first. 
 
PROBLEM LIST:  
(Skilled intervention is medically necessary to address:) 1. Decreased ADL/Functional Activities 2. Decreased Activity Tolerance 3. Decreased AROM/PROM 4. Decreased Balance 5. Decreased Cognition 6. Decreased Coordination 7. Decreased Gait Ability 8. Decreased Strength 9. Decreased Transfer Abilities INTERVENTIONS PLANNED:  
(Benefits and precautions of physical therapy have been discussed with the patient.) 1. Therapeutic Activity 2. Therapeutic Exercise/HEP 3. Neuromuscular Re-education 4. Gait Training 5. Manual Therapy 6. Education TREATMENT:  
 
EVALUATION: Low Complexity : (Untimed Charge) TREATMENT:  
($$ Therapeutic Activity: 23-37 mins    ) Therapeutic Activity (23 Minutes): Therapeutic activity included Rolling, Supine to Sit, Sit to Supine, Scooting, Transfer Training, Ambulation on level ground, Sitting balance  and Standing balance to improve functional Mobility, Strength, ROM and Activity tolerance. AFTER TREATMENT POSITION/PRECAUTIONS: 
Alarm Activated, Bed, Needs within reach and RN notified INTERDISCIPLINARY COLLABORATION: 
RN/PCT 
 
TOTAL TREATMENT DURATION: 
PT Patient Time In/Time Out Time In: 3840 Time Out: 1435 Pradeep Oconnell, JOAQUIM

## 2021-01-02 NOTE — PROGRESS NOTES
Pt is sleeping in bed. Respirations present on 3L NC. No signs of distress. No needs expressed. Bed low and locked. Call light within reach.  Report given to Deepthi Priest

## 2021-01-02 NOTE — PROGRESS NOTES
Pt is resting bed. Respirations present. Safety measures in place.  Report received from Sara Rodriguez [facility]

## 2021-01-03 NOTE — PROGRESS NOTES
Hospitalist Progress Note 1/3/2021 Admit Date: 2021  9:55 AM  
NAME: Edyd Stout :  1945 MRN:  [id number] Attending: Darian Daniel DO 
PCP:  Zak Ribeiro MD 
 
SUBJECTIVE:  
Patient is a 70year old CM w/ a PMHx of CAD s/p CABG, DMII presented with SOB. Pt stated that he started having SOB associated with fatigue, myalgias, cough, fever and chills about 5 days ago. He went to the ED 2 days later and was tested for Posive for Covid (). He was sent home on antibiotics at that time as he did not require supplemental oxygen at rest and on exertion. Pt stated in the past several days symptoms have gotten worse. He had a T 102 PTA and checked his O2 sat which dropped down to the 79%, prompting him to call EMS. In the ED, pt was placed on 2L with O2sat>90%. CBC and CMP at baseline. Procal negative. CXR shows bibasilar atelectasis or pneumonia worse in the interval. Pt was given dexamethasone, azithromycin and Ceftriaxone in ED. In bed resting w/o complaints. Increase in O2 this AM. Cough has gotten better. He denies peripheral edema, chest pain, abdominal pain, N/V, diarrhea or constipation. Review of Systems negative with exception of pertinent positives noted above PHYSICAL EXAM  
 
Visit Vitals BP (!) 114/59 Pulse 61 Temp 98 °F (36.7 °C) Resp 18 Ht 5' 10\" (1.778 m) Wt 96.6 kg (213 lb) SpO2 90% BMI 30.56 kg/m² Temp (24hrs), Av °F (36.7 °C), Min:97.9 °F (36.6 °C), Max:98.1 °F (36.7 °C) Oxygen Therapy O2 Sat (%): 90 % (21 1300) Pulse via Oximetry: 80 beats per minute (21 1140) O2 Device: Hi flow nasal cannula (21 2124) O2 Flow Rate (L/min): 10 l/min (21 8965) No intake or output data in the 24 hours ending 21 1617 General: No acute distress Lungs:  Coarse lung sounds Bilaterally. Heart:  Regular rate and rhythm,  No murmur, rub, or gallop Abdomen: Soft, Non distended, Non tender, Positive bowel sounds Extremities: No cyanosis, clubbing or edema Neurologic:  No focal deficits XR CHEST PORT Final Result IMPRESSION: Bibasilar atelectasis or pneumonia worse in the interval.  
  
 
 
 
ASSESSMENT Active Hospital Problems Diagnosis Date Noted  Acute respiratory failure with hypoxia ([facility] 01/01/2021  Pneumonia due to COVID-19 virus 01/01/2021  Mixed hyperlipidemia 01/01/2021  Gastroesophageal reflux disease 01/01/2021  Stage 3a chronic kidney disease 01/01/2021  Vitamin D deficiency 01/01/2021  Other chest pain 04/27/2020  Coronary artery disease involving native coronary artery of native heart with angina pectoris ([facility] 04/21/2020 [facility]. Abn ETT led to 3v CABG. 1990s?  Type 2 diabetes mellitus ([facility] 07/23/2018  Essential hypertension 09/15/2010 Last Assessment & Plan:  
Patient is stable on medications Plan: 
 
Acute respiratory failure with hypoxia 2/2 Covid-19 Pneumonia O2 sat at home 79% on RA and placed on 2L NC in ED. COVID POSITIVE on 12/30. CXR shows b/l atelectasis vs pneumonia. Procal negative. Convalescent plasma ordered--pt agreeable Dexamethasone for 10 days (1/1-1/10) Remdesivir for 5 days No abx as low suspicion for superimposed bacterial infection with negative procal 
Zinc/vitamin C daily Added IS and albuterol prn Prone as tolerated SSI while on steroids. Added basal 
D-Dimer negative Oxygen supplementation--10LNC. Wean O2 as tolerated F/u D-Dimer and other covid labs. Monitor renal and hepatic function while on remdesivir. Elevated LFT: AST>ALT (wnl). Most likely secondary to vial infection. Improving. Hold Remdesivir if ALT>250. CAD s/p CABG: Cont home ASA, statin, lisinopril metoprolol T2DM: hold home Actos. On SSI w/ POC BG qAC/HS. Added lantus. HTN: Cont home Lisinopril and metoprolol GERD: Pepcid BID Stage 3 CKD: Cr at baseline HLD: Cont home statin Vit D Def: Cont home supplements 
  
 DVT PPx: Lovenox SQ Code Status: FULL 
  
Anticipated discharge: Pending clinical course as still on high O2. PT/OT ordered. Signed By: Aron Zuñiga DO   
 January 3, 2021

## 2021-01-03 NOTE — PROGRESS NOTES
Shift Assessment. Pt is A&O x 4. Pt cardiac rhythm is regular. Pt lungs are diminished. Pt is on 10L via high flow nasal cannula. Bowel sounds present. Pt denies pain or nausea. Pt is resting in bed with the bed in the lowest position with the call light within reach.

## 2021-01-03 NOTE — PROGRESS NOTES
Covid precautions in place  did not visit with patient Spoke with staff Reviewed notes for spiritual concerns Prayer offered for patient's healing and protection STAFF IS PROVIDING VERY COMPASSIONATE CARE

## 2021-01-04 PROBLEM — D72.829 LEUKOCYTOSIS: Status: ACTIVE | Noted: 2021-01-01

## 2021-01-04 NOTE — PROGRESS NOTES
ACUTE PHYSICAL THERAPY GOALS: 
(Developed with and agreed upon by patient and/or caregiver.) STG: 
(1.)Mr. Bibi Nava will move from supine to sit and sit to supine , scoot up and down and roll side to side with SUPERVISION within 4 treatment day(s). (2.)Mr. Bibi Nava will transfer from bed to chair and chair to bed with SUPERVISION using the least restrictive device within 4 treatment day(s). (3.)Mr. Bibi Nava will ambulate with SUPERVISION for 100 feet with the least restrictive device within 4 treatment day(s).  
  
LTG: 
(1.)Mr. Bibi Nava will move from supine to sit and sit to supine , scoot up and down and roll side to side in bed with MODIFIED INDEPENDENCE within 7 treatment day(s). (2.)Mr. Bibi Nava will transfer from bed to chair and chair to bed with MODIFIED INDEPENDENCE using the least restrictive device within 7 treatment day(s). (3.)Mr. Bibi Nava will ambulate with MODIFIED INDEPENDENCE for 200 feet with the least restrictive device within 7 treatment day(s). ________________________________________________________________________________________________ PHYSICAL THERAPY: Daily Note and PM Treatment Day # 2 Tarah Vicente is a 70 y.o. male PRIMARY DIAGNOSIS: Pneumonia due to COVID-19 virus Acute respiratory failure with hypoxia ([facility] [J96.01] Pneumonia due to COVID-19 virus [U07.1, J12.82] ASSESSMENT:  
 
REHAB RECOMMENDATIONS: CURRENT LEVEL OF FUNCTION: 
(Most Recently Demonstrated) Recommendation to date pending progress: 
Setting: 
? No further skilled therapy Equipment:  
? None Bed Mobility: 
? Not tested Sit to Stand: 
? Tonya Melo Transfers: 
? Tonya Melo Gait/Mobility: 
? oTnya Melo ASSESSMENT: 
 Mr. Kristin Lara presents in bedside chair, right after his O2 is increased above 10L. He performs seated therex and 3 brief bouts of ambulation in room. Overall, he requires less guarding in ambulation but still exhibits significantly decreased activity tolerance. SUBJECTIVE:  
Mr. Kristin Lara states, \"nice to meet you. \" SOCIAL HISTORY/ LIVING ENVIRONMENT:  
  
OBJECTIVE:  
 
PAIN: VITAL SIGNS: LINES/DRAINS:  
Pre Treatment: Pain Screen Pain Scale 1: Numeric (0 - 10) Pain Intensity 1: 0 Post Treatment: 0   IV 
O2 Device: Hi flow nasal cannula MOBILITY: I Mod I S SBA CGA Min Mod Max Total  NT x2 Comments:  
Bed Mobility Rolling [] [] [] [] [] [] [] [] [] [x] [] Supine to Sit [] [] [] [] [] [] [] [] [] [x] [] Scooting [] [] [] [] [] [] [] [] [] [x] [] Sit to Supine [] [] [] [] [] [] [] [] [] [x] [] Transfers Sit to Stand [] [] [] [x] [] [] [] [] [] [] [] Bed to Chair [] [] [] [] [] [] [] [] [] [x] [] Stand to Sit [] [] [] [x] [] [] [] [] [] [] [] I=Independent, Mod I=Modified Independent, S=Supervision, SBA=Standby Assistance, CGA=Contact Guard Assistance,  
Min=Minimal Assistance, Mod=Moderate Assistance, Max=Maximal Assistance, Total=Total Assistance, NT=Not Tested GAIT: I Mod I S SBA CGA Min Mod Max Total  NT x2 Comments:  
Level of Assistance [] [] [] [x] [] [] [] [] [] [] [] Distance 3 x 15 ft   
DME Gait Belt Gait Quality Slow and shuffled I=Independent, Mod I=Modified Independent, S=Supervision, SBA=Standby Assistance, CGA=Contact Guard Assistance,  
Min=Minimal Assistance, Mod=Moderate Assistance, Max=Maximal Assistance, Total=Total Assistance, NT=Not Tested PLAN:  
FREQUENCY/DURATION: PT Plan of Care: 3 times/week for duration of hospital stay or until stated goals are met, whichever comes first. 
TREATMENT:  
 
TREATMENT:  
($$ Therapeutic Exercises: 23-37 mins    ) Therapeutic Exercise (28 Minutes): Therapeutic exercises noted below to improve functional activity tolerance, AROM, strength and mobility. Date: 
1/4 Date: 
 Date: Activity/Exercise Parameters Parameters Parameters LAQs 3x10 Level ground ambulation 3 x15 ft Seated Marches 3 x10    
     
     
     
     
 
 
 
 
AFTER TREATMENT POSITION/PRECAUTIONS: 
Chair, Needs within reach and RN notified INTERDISCIPLINARY COLLABORATION: 
RN/PCT and PT/PTA TOTAL TREATMENT DURATION: 
PT Patient Time In/Time Out Time In: 7357 Time Out: 8216 Robi Maher

## 2021-01-04 NOTE — DIABETES MGMT
Glucose this . Patient received Humalog 9 units and Decadron 6mg. Most recent FSBS 279. Provider updated via [employer] regarding patient glycemic. Patient would likely benefit from an increase in prandial insulin if stricter glycemic control is desired to help offset hyperglycemia during the day related to caloric intake and steroid therapy. Spoke with provider new orders received to increase prandial insulin to Humalog 9 units with meals.

## 2021-01-04 NOTE — ACP (ADVANCE CARE PLANNING)
Advance Care Planning Advance Care Planning Activator (Inpatient) Conversation Note Date of ACP Conversation: 01/04/21 Conversation Conducted with:   Patient with Decision Making Capacity ACP Activator: Delmer [facility] *When Decision Maker makes decisions on behalf of the incapacitated patient: Decision Maker is asked to consider and make decisions based on patient values, known preferences, or best interests. Health Care Decision Maker: 
 
Current Designated Health Care Decision Maker:  
(If there is a valid [employer] named in the [id number] [place] [address] Makers\" box in the ACP activity, but it is not visible above, be sure to open that field and then select the health care decision maker relationship (ie \"primary\") in the blank space to the right of the name.) Validate  this information as still accurate & up-to-date; edit [employer] field as needed.) Note: Assess and validate information in current ACP documents, as indicated. If no Decision Maker listed above or available through scanned documents, then: 
 
If no Authorized Decision Maker has previously been identified, then patient chooses [employer]: \"Who would you like to name as your primary health care decision-maker? \" Name: Jer Almaraz Relationship: daughter Phone number: [phone] \"Can this person be reached easily? \" Bernadine Zhuo \"Who would you like to name as your back-up decision maker? \" Name: Owen Herman  Relationship: spouse Phone number: [phone] \"Can this person be reached easily? \" Bernadine Zhou Note: If the relationship of these Decision-Makers to the patient does NOT follow your state's Next of Kin hierarchy, recommend that patient complete ACP document that meets state-specific requirements to allow them to act on the patient's behalf when appropriate. Care Preferences Ventilation: \"If you were in your present state of health and suddenly became very ill and were unable to breathe on your own, what would your preference be about the use of a ventilator (breathing machine) if it were available to you? \" If patient would desire the use of a ventilator (breathing machine), answer \"If your health worsens and it becomes clear that your chance of recovery is unlikely, what would your preference be about the use of a ventilator (breathing machine) if it were available to you? \" Would the patient desire the use of a ventilator (breathing machine)? NO Resuscitation \"CPR works best to restart the heart when there is a sudden event, like a heart attack, in someone who is otherwise healthy. Unfortunately, CPR does not typically restart the heart for people who have serious health conditions or who are very sick. \" \"In the event your heart stopped as a result of an underlying serious health condition, would you want attempts to be made to restart your heart (answer \"yes\" for attempt to resuscitate) or would you prefer a natural death (answer \"no\" for do not attempt to resuscitate)? \" yes NOTE: If the patient has a valid advance directive AND now provides care preference(s) that are inconsistent with that prior directive, advise the patient to consider either: creating a new advance directive that complies with state-specific requirements; or, if that is not possible, orally revoking that prior directive in accordance with state-specific requirements, which must be documented in the EHR. [x] Yes  [] No   Educated Patient / Ross Gallegos regarding differences between Advance Directives and portable DNR orders. Length of ACP Conversation in minutes:   
 
Conversation Outcomes: 
[] ACP discussion completed 
[] Existing advance directive reviewed with patient; no changes to patient's previously recorded wishes 
 
 [] New Advance Directive completed [] Portable Do Not Resuscitate prepared for Provider review and signature 
[] POLST/POST/MOLST/MOST prepared for Provider review and signature Follow-up plan:   
[] Schedule follow-up conversation to continue planning 
[] Referred individual to Provider for additional questions/concerns  
[] Advised patient/agent/surrogate to review completed ACP document and update if needed with changes in condition, patient preferences or care setting  
 
[x] This note routed to one or more involved healthcare providers

## 2021-01-04 NOTE — DIABETES MGMT
Patient admitted with pneumonia due to COVID-19 virus. Admitting blood glucose 235. HbA1c 9.8 (). Blood glucose ranged 300-364 yesterday with patient receiving Lantus 20 units, Humalog 47 units, and Decadron 6mg. Blood glucose this morning was 232. Reviewed patient current regimen: Lantus 20 units nightly, Humalog 5 units with meals, Humalog SSI, and Decadron 6mg PO daily. Attempted to speak with patient from remotely within hospital system for assessment of diabetes educational needs. No answer received. Unit secretary made aware, requested patient phone be placed within his reach and patient be updated regarding call from educator. Per chart review patient has a PMH of HTN, Type 2 DM, CAD, mixed hyperlipidemia, CABG, GERD, stage 3 chronic kidney disease. Will re-attempt to speak with patient later today as patient condition and time allows. Update at 1146- re-attempted to speak with patient from remotely within hospital system, no answer received. Will continue to follow along.

## 2021-01-04 NOTE — PROGRESS NOTES
Care Management Interventions PCP Verified by CM: Yes Physical Therapy Consult: Yes Occupational Therapy Consult: Yes Speech Therapy Consult: No 
Current Support Network: Lives with Spouse Confirm Follow Up Transport: Self Freedom of Choice List was Provided with Basic Dialogue that Supports the Patient's Individualized Plan of Care/Goals, Treatment Preferences and Shares the Quality Data Associated with the Providers?: Yes [employer] Information Provided?: No 
Discharge Location Discharge Placement: Home CM called patient in his room. Patient is alert and oriented in all spheres. Lives with his wife. Independent with ambulation and ADls. Confirmed pcp. DME; none No history of HH or rehab. PT is not recommending any further needs. CM following for home 02 needs.

## 2021-01-04 NOTE — PROGRESS NOTES
Reviewed notes for spiritual concerns Did not visit due to covid restrictions Will continue to follow closely

## 2021-01-04 NOTE — PROGRESS NOTES
Hospitalist Progress Note   
2021 
Admit Date: 2021  9:55 AM  
NAME: Jf Goetz  
:  1945  
MRN:  [id number]  
Attending: Kim Martins DO 
PCP:  Teddy Solorzano Jr., MD 
 
SUBJECTIVE:  
Patient is a 75 year old CM w/ a PMHx of CAD s/p CABG, DMII presented with SOB. Pt stated that he started having SOB associated with fatigue, myalgias, cough, fever and chills about 5 days ago. He went to the ED 2 days later and was tested for Posive for Covid (). He was sent home on antibiotics at that time as he did not require supplemental oxygen at rest and on exertion. Pt stated in the past several days symptoms have gotten worse. He had a T 102 PTA and checked his O2 sat which dropped down to the 79%, prompting him to call EMS.  
In the ED, pt was placed on 2L with O2sat>90%. CBC and CMP at baseline. Procal negative. CXR shows bibasilar atelectasis or pneumonia worse in the interval. Pt was given dexamethasone, azithromycin and Ceftriaxone in ED. 
 
Sitting up in recliner this AM. In good spirit. Still remains on 10LNC. Desats easily with ambulation. He denies peripheral edema, chest pain, abdominal pain, N/V, diarrhea or constipation. 
 
Review of Systems negative with exception of pertinent positives noted above 
PHYSICAL EXAM  
 
Visit Vitals 
/61 (BP 1 Location: Left arm, BP Patient Position: At rest)  
Pulse 60  
Temp 98 °F (36.7 °C)  
Resp 20  
Ht 5' 10\" (1.778 m)  
Wt 96.6 kg (213 lb)  
SpO2 90% Comment: increased to 12L; found 76% after moving  
BMI 30.56 kg/m²  
  
Temp (24hrs), Av.2 °F (36.8 °C), Min:98 °F (36.7 °C), Max:98.3 °F (36.8 °C) 
 
Oxygen Therapy 
O2 Sat (%): 90 %(increased to 12L; found 76% after moving) (21 1456) 
Pulse via Oximetry: 80 beats per minute (21 1140) 
O2 Device: Hi flow nasal cannula (21 075) 
O2 Flow Rate (L/min): 10 l/min (21 0751) 
 
Intake/Output Summary (Last 24 hours) at 2021 1718 
Last data filed at 2021 1300 
 Gross per 24 hour Intake 450 ml Output  Net 450 ml General: No acute distress Lungs:  Coarse lung sounds Bilaterally. Heart:  Regular rate and rhythm,  No murmur, rub, or gallop Abdomen: Soft, Non distended, Non tender, Positive bowel sounds Extremities: No cyanosis, clubbing or edema Neurologic:  No focal deficits XR CHEST SNGL V Final Result Impression: Improving bilateral parenchymal opacities. XR CHEST PORT Final Result IMPRESSION: Bibasilar atelectasis or pneumonia worse in the interval.  
  
 
 
 
ASSESSMENT Active Hospital Problems Diagnosis Date Noted  Acute respiratory failure with hypoxia ([facility] [address] 01/01/2021  Pneumonia due to COVID-19 virus 01/01/2021  Mixed hyperlipidemia 01/01/2021  Gastroesophageal reflux disease 01/01/2021  Stage 3a chronic kidney disease 01/01/2021  Vitamin D deficiency 01/01/2021  Other chest pain 04/27/2020  Coronary artery disease involving native coronary artery of native heart with angina pectoris ([facility] [address] 04/21/2020 [facility] Card[facility]. [facility] ETT led to 3v CABG. 1990s?  Type 2 diabetes mellitus ([facility] [address] 07/23/2018  Essential hypertension 09/15/2010 Last Assessment & Plan:  
Patient is stable on medications Plan: 
 
Acute respiratory failure with hypoxia 2/2 Covid-19 Pneumonia O2 sat at home 79% on RA and placed on 2L NC in ED. COVID POSITIVE on 12/30. CXR shows b/l atelectasis vs pneumonia. Procal negative. Convalescent plasma ordered--pt agreeable Dexamethasone for 10 days (1/1-1/10) Remdesivir for 5 days No abx as low suspicion for superimposed bacterial infection with negative procal 
Zinc/vitamin C daily Added IS and albuterol prn Prone as tolerated SSI while on steroids. Added basal and premeal. 
D-Dimer negative Oxygen supplementation--10LNC. Wean O2 as tolerated. CXR this AM w/ improving nb/l parenchymal opacities. F/u D-Dimer and other covid labs. Monitor renal and hepatic function while on remdesivir. Leukocytosis: most likely 2/2 steroids. Elevated LFT: AST>ALT (wnl). Most likely secondary to vial infection. Improving. Hold Remdesivir if ALT>250. CAD s/p CABG: Cont home ASA, statin, lisinopril metoprolol T2DM: hold home Actos. On SSI w/ POC BG qAC/HS. Added lantus. HTN: Cont home Lisinopril and metoprolol GERD: Pepcid BID Stage 3 CKD: Cr at baseline HLD: Cont home statin Vit D Def: Cont home supplements 
  
DVT PPx: Lovenox SQ Code Status: FULL 
  
Anticipated discharge: Pending clinical course as still on high O2. PT/OT ordered. Signed By: Queen Maciel DO   
 January 4, 2021

## 2021-01-05 NOTE — DIABETES MGMT
Patient seen via telehealth for diabetes education regarding insulin instruction. Educated patient regarding basal/bolus regimen of Lantus 20 units nightly and Humalog including type of insulin, timing with meals, onset, duration of effect, and peak of insulin dose. Educated patient regarding sliding scale insulin. Instructed patient to always seek guidance from their primary care provider about adjusting their insulin doses and not to adjust them on their own as this could negatively impact their glycemic control or result in hypoglycemia. Patient verbalizes understanding. Patient successful with practice insulin calculations using example sliding scale. Patient will need prescription for glucometer kit, test strips, and lancets at discharge. 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 Patient instructed on the preparation and injection of insulin dose via vial and syringe method. Patient returned demonstrated proper insulin injection technique using injection model, syringe, and vial of saline. Nursing will continue to have patient practice insulin self-injection when insulin dose is due and document progress or refusals of insulin practice in progress notes. Patient verbalized understanding of site rotation, storage of insulin, and proper sharps disposal. Patient was also instructed in proper use of insulin pens. Patient was able to return demonstrate proper use of insulin pen using injection model and saline demo pen. Patient prefers insulin pens but is okay with using either method of insulin administration. Patient will need prescription for insulin pens and pen needles at discharge if discharged on insulin. Patient given educational folder with diabetes educational materials. Encouraged patient to look over diabetes educational materials as able today. Plan to follow up and speak with patient remotely from within hospital system tomorrow as patient condition allows. Patient in agreement with plan and voices no further questions regarding diabetes management at this time.

## 2021-01-05 NOTE — PROGRESS NOTES
Hospitalist Progress Note 2021 Admit Date: 2021  9:55 AM  
NAME: Glynn Nagy :  1945 MRN:  [id number] Attending: Yancy Rogel MD 
PCP:  Alonso Jimenez MD 
 
SUBJECTIVE:  
Patient is a 70year old CM w/ a PMHx of CAD s/p CABG, DMII presented with SOB. Pt stated that he started having SOB associated with fatigue, myalgias, cough, fever and chills about 5 days ago. He went to the ED 2 days later and was tested for Posive for Covid (). He was sent home on antibiotics at that time as he did not require supplemental oxygen at rest and on exertion. Pt stated in the past several days symptoms have gotten worse. He had a T 102 PTA and checked his O2 sat which dropped down to the 79%, prompting him to call EMS. In the ED, pt was placed on 2L with O2sat>90%. CBC and CMP at baseline. Procal negative. CXR shows bibasilar atelectasis or pneumonia worse in the interval. Pt was given dexamethasone, azithromycin and Ceftriaxone in ED. 
 
: sitting up in chair. Still remains on 10L but has been feeling less short of breath and able to move in room better. No peripheral edema, chest pain, abdominal pain, N/V, diarrhea or constipation. Review of Systems negative with exception of pertinent positives noted above PHYSICAL EXAM  
 
Visit Vitals /86 Pulse 61 Temp 98.1 °F (36.7 °C) Resp 18 Ht 5' 10\" (1.778 m) Wt 96.6 kg (213 lb) SpO2 90% BMI 30.56 kg/m² Temp (24hrs), Av °F (36.7 °C), Min:97.5 °F (36.4 °C), Max:98.3 °F (36.8 °C) Oxygen Therapy O2 Sat (%): 90 % (21 1145) Pulse via Oximetry: 80 beats per minute (21 1140) O2 Device: Hi flow nasal cannula (21 0751) O2 Flow Rate (L/min): 10 l/min (21 0751) No intake or output data in the 24 hours ending 21 3156 General: No acute distress Lungs:  Coarse lung sounds Bilaterally. Heart:  Regular rate and rhythm,  No murmur, rub, or gallop Abdomen: Soft, Non distended, Non tender, Positive bowel sounds Extremities: No cyanosis, clubbing or edema Neurologic:  No focal deficits XR CHEST SNGL V Final Result Impression: Improving bilateral parenchymal opacities. XR CHEST PORT Final Result IMPRESSION: Bibasilar atelectasis or pneumonia worse in the interval.  
  
 
 
 
ASSESSMENT Active Hospital Problems Diagnosis Date Noted  Leukocytosis 01/04/2021  Acute respiratory failure with hypoxia ([facility] [address] 01/01/2021  Pneumonia due to COVID-19 virus 01/01/2021  Mixed hyperlipidemia 01/01/2021  Gastroesophageal reflux disease 01/01/2021  Stage 3a chronic kidney disease 01/01/2021  Vitamin D deficiency 01/01/2021  Other chest pain 04/27/2020  Coronary artery disease involving native coronary artery of native heart with angina pectoris ([facility] [address] 04/21/2020 [facility] Cardio. [facility] ETT led to 3v CABG. 1990s?  Type 2 diabetes mellitus ([facility] [address] 07/23/2018  Essential hypertension 09/15/2010 Last Assessment & Plan:  
Patient is stable on medications Plan: 
 
Acute respiratory failure with hypoxia 2/2 Covid-19 Pneumonia O2 sat at home 79% on RA and placed on 2L NC in ED. COVID POSITIVE on 12/30. CXR shows b/l atelectasis vs pneumonia. S/p plasma Dexamethasone for 10 days (1/1-1/10) Remdesivir for 5 days Prone as tolerated SSI while on steroids. Added basal and premeal. 
Oxygen supplementation--10LNC. Wean O2 as tolerated. Leukocytosis: most likely 2/2 steroids. Elevated LFT: AST>ALT (wnl). Most likely secondary to viral infection. Improving. Hold Remdesivir if ALT>250. CAD s/p CABG: Cont home ASA, statin, lisinopril metoprolol T2DM: hold home Actos. On SSI w/ POC BG qAC/HS. Added lantus. HTN: Cont home Lisinopril and metoprolol GERD: Pepcid BID Stage 3 CKD: Cr at baseline HLD: Cont home statin Vit D Def: Cont home supplements 
  
DVT PPx: Lovenox SQ Code Status: FULL 
  
 Anticipated discharge: Pending clinical course as still on high O2. PT/OT ordered. Signed By: Siomara Figueroa MD   
 January 5, 2021

## 2021-01-05 NOTE — DIABETES MGMT
Patient spoke to from remotely within hospital system for assessment regarding diabetes management. Patient alert and correctly verbalized patient identifiers. Patient states they were diagnosed in the 1980s with diabetes and voices a positive family history of diabetes. Patient states they do not have a working glucometer with supplies at home. Patient states he used to check his glucose but it was always in the 140s so he stopped checking. Patient states he goes to the doctor every 6 months for check up on his diabetes. Patient is familiar with how to use a glucometer and politely declines meter instruction. Patient states they are currently taking \"1 pill\" at home for management of diabetes. Patient states he doesn't know the names of his pills but that he puts them all in his pill box and takes them every night. Patient has attended formal diabetes education in the past. Patient reports no difficulty with affording their diabetic supplies. Educated patient regarding A1c significance. HbA1c 9.8 (). Educated patient regarding target goals. Discussed relationship between hyperglycemia and infection. Patient receiving steroid therapy. Explained relationship between steroids and hyperglycemia to patient and educated patient that as steroids are tapered their glycemic control should improve and insulin needs may decrease. Patient states that he eats right and exercises. Educated patient that diabetes is a progressive disease. Patient states \"I wish someone would have told me that before so I might have taken it more serious. 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 Patient also states he and his dog exercise each morning. Patient states he does 5 exercises every day. Educated patient regarding importance of FSBS at discharge recommending TID per day unless otherwise directed by provider and follow up with PCP. Discussed short term complications related to uncontrolled hyperglycemia. Also discussed possible need for insulin at discharge. Patient states doing insulin would not be a problem if it was something he needed. Plan to follow up with patient for telehealth education session. Patient voices no other questions regarding diabetes management at this time.

## 2021-01-05 NOTE — DIABETES MGMT
Patient admitted with pneumonia due to COVID-19 virus. Blood glucose ranged 232-356 yesterday with patient receiving Lantus 20 units, Humalog 45 units, and Decadron 6mg. Blood glucose this morning was 243. Reviewed patient current regimen: Lantus 20 units nightly, Humalog 9 units with meals, Humalog SSI, and Decadron 6mg daily. Updated provider via [employer] regarding patient glycemic control and recommendations.  Patient would likely benefit from an increase in basal insulin as fasting blood glucose is not at goal.

## 2021-01-05 NOTE — PROGRESS NOTES
ACUTE PHYSICAL THERAPY GOALS: 
(Developed with and agreed upon by patient and/or caregiver.) STG: 
(1.)Mr. Pamela Valentine will move from supine to sit and sit to supine , scoot up and down and roll side to side with SUPERVISION within 4 treatment day(s). (2.)Mr. Pamela Valentine will transfer from bed to chair and chair to bed with SUPERVISION using the least restrictive device within 4 treatment day(s). (3.)Mr. Pamela Valentine will ambulate with SUPERVISION for 100 feet with the least restrictive device within 4 treatment day(s).  
  
LTG: 
(1.)Mr. Pamela Valentine will move from supine to sit and sit to supine , scoot up and down and roll side to side in bed with MODIFIED INDEPENDENCE within 7 treatment day(s). (2.)Mr. Pamela Valentine will transfer from bed to chair and chair to bed with MODIFIED INDEPENDENCE using the least restrictive device within 7 treatment day(s). (3.)Mr. Pamela Valentine will ambulate with MODIFIED INDEPENDENCE for 200 feet with the least restrictive device within 7 treatment day(s). ________________________________________________________________________________________________ PHYSICAL THERAPY: Daily Note and PM Treatment Day # 3 Marck Feliciano is a 70 y.o. male PRIMARY DIAGNOSIS: Pneumonia due to COVID-19 virus [facility] [J96.01] Pneumonia due to COVID-19 virus [U07.1, J12.82] ASSESSMENT:  
 
REHAB RECOMMENDATIONS: CURRENT LEVEL OF FUNCTION: 
(Most Recently Demonstrated) Recommendation to date pending progress: 
Setting: 
? No further skilled therapy Equipment:  
? None Bed Mobility: 
? Not tested Sit to Stand: 
? Tonya Melo Transfers: 
? Tonya Melo Gait/Mobility: 
? Tonya Melo ASSESSMENT: 
Mr. Pamela Valentine presents in bedside chair again today, with increased activity tolerance today, including increased gait to 3 x 30ft. At end of session, he is up in bedside chair with all needs within reach.   
 
SUBJECTIVE:  
 Mr. Frantz Charles states, \"nice to meet you. \" SOCIAL HISTORY/ LIVING ENVIRONMENT:  
  
OBJECTIVE:  
 
PAIN: VITAL SIGNS: LINES/DRAINS:  
Pre Treatment: Pain Screen Pain Scale 1: Numeric (0 - 10) Pain Intensity 1: 0 Post Treatment: 0   IV 
O2 Device: Hi flow nasal cannula MOBILITY: I Mod I S SBA CGA Min Mod Max Total  NT x2 Comments:  
Bed Mobility Rolling [] [] [] [] [] [] [] [] [] [x] [] Supine to Sit [] [] [] [] [] [] [] [] [] [x] [] Scooting [] [] [] [] [] [] [] [] [] [x] [] Sit to Supine [] [] [] [] [] [] [] [] [] [x] [] Transfers Sit to Stand [] [] [] [x] [] [] [] [] [] [] [] Bed to Chair [] [] [] [] [] [] [] [] [] [x] [] Stand to Sit [] [] [] [x] [] [] [] [] [] [] [] I=Independent, Mod I=Modified Independent, S=Supervision, SBA=Standby Assistance, CGA=Contact Guard Assistance,  
Min=Minimal Assistance, Mod=Moderate Assistance, Max=Maximal Assistance, Total=Total Assistance, NT=Not Tested GAIT: I Mod I S SBA CGA Min Mod Max Total  NT x2 Comments:  
Level of Assistance [] [] [] [x] [] [] [] [] [] [] [] Distance 3 x 30 ft   
DME Gait Belt Gait Quality Slow and shuffled I=Independent, Mod I=Modified Independent, S=Supervision, SBA=Standby Assistance, CGA=Contact Guard Assistance,  
Min=Minimal Assistance, Mod=Moderate Assistance, Max=Maximal Assistance, Total=Total Assistance, NT=Not Tested PLAN:  
FREQUENCY/DURATION: PT Plan of Care: 3 times/week for duration of hospital stay or until stated goals are met, whichever comes first. 
TREATMENT:  
 
TREATMENT:  
($$ Therapeutic Exercises: 23-37 mins    ) Therapeutic Exercise (28 Minutes): Therapeutic exercises noted below to improve functional activity tolerance, AROM, strength and mobility. Date: 
1/4 Date: 
1/5 Date: Activity/Exercise Parameters Parameters Parameters LAQs 3x10 3 x 10 Level ground ambulation 3 x15 ft 3 x 30 ft Seated Marches 3 x10 X 10 STSs  2 x 4   
     
     
     
 
 
 
 
 AFTER TREATMENT POSITION/PRECAUTIONS: 
Chair, Needs within reach and RN notified INTERDISCIPLINARY COLLABORATION: 
RN/PCT and PT/PTA TOTAL TREATMENT DURATION: 
PT Patient Time In/Time Out Time In: 3974 Time Out: 1043 Annelise Guo

## 2021-01-06 NOTE — DIABETES MGMT
Patient admitted with pneumonia due to COVID-19. Blood glucose ranged 235-328 yesterday with patient receiving Lantus 25 units, Humalog 49 units, and Decadron 6mg. Blood glucose this morning was 205. Reviewed patient current regimen: Lantus 25 units nightly, Humalog 9 units with meals, Humalog SSI, and Decadron 6mg. Updated provider via [employer]llie regarding patient glycemic control and recommendations. Patient would likely benefit from an increase in prandial and basal insulin if stricter glycemic control is desired. Update at [phone] spoke with patient remotely from within hospital system patient correctly verbalized patient identifiers and is alert. Noted per chart review patient on Airvo. Patient states he has practiced insulin self injection and feels comfortable with this. Patient voices no questions regarding diabetes management at this time.

## 2021-01-06 NOTE — PROGRESS NOTES
Beside shift report received from [facility]  Patient lying in bed  Respirations even and unlabored on Airvo 50L/70%  No signs of distress  No needs expressed at this time  Safety measures in place

## 2021-01-06 NOTE — PROGRESS NOTES
ACUTE PHYSICAL THERAPY GOALS: 
(Developed with and agreed upon by patient and/or caregiver.) STG: 
(1.)Mr. Pamela Valentine will move from supine to sit and sit to supine , scoot up and down and roll side to side with SUPERVISION within 4 treatment day(s). (2.)Mr. Pamela Valentine will transfer from bed to chair and chair to bed with SUPERVISION using the least restrictive device within 4 treatment day(s). (3.)Mr. Pamela Valentine will ambulate with SUPERVISION for 100 feet with the least restrictive device within 4 treatment day(s).  
  
LTG: 
(1.)Mr. Pamela Valentine will move from supine to sit and sit to supine , scoot up and down and roll side to side in bed with MODIFIED INDEPENDENCE within 7 treatment day(s). (2.)Mr. Pamela Valentine will transfer from bed to chair and chair to bed with MODIFIED INDEPENDENCE using the least restrictive device within 7 treatment day(s). (3.)Mr. Pamela Valentine will ambulate with MODIFIED INDEPENDENCE for 200 feet with the least restrictive device within 7 treatment day(s). ________________________________________________________________________________________________ PHYSICAL THERAPY: Daily Note and PM Treatment Day # 4 Marck Feliciano is a 70 y.o. male PRIMARY DIAGNOSIS: Pneumonia due to COVID-19 virus [facility] [J96.01] Pneumonia due to COVID-19 virus [U07.1, J12.82] ASSESSMENT:  
 
REHAB RECOMMENDATIONS: CURRENT LEVEL OF FUNCTION: 
(Most Recently Demonstrated) Recommendation to date pending progress: 
Setting: 
? No further skilled therapy Equipment:  
? None Bed Mobility: 
? Supervision Sit to Stand: 
? Contact Guard Assistance Transfers: 
? Tonya Melo Gait/Mobility: 
? Tonya Melo ASSESSMENT: 
Mr. Pamela Valentine is making slow, steady progress towards PT goals as noted by increased gait distance and activity tolerance. Will continue PT efforts. SUBJECTIVE:  
Mr. [facility] states, \"My tiera is orange. \" 
 
 SOCIAL HISTORY/ LIVING ENVIRONMENT:  
  
OBJECTIVE:  
 
PAIN: VITAL SIGNS: LINES/DRAINS:  
Pre Treatment: Pain Screen Pain Scale 1: FLACC Pain Intensity 1: 0 Post Treatment: 0   None O2 Device: Heated, Hi flow nasal cannula MOBILITY: I Mod I S SBA CGA Min Mod Max Total  NT x2 Comments:  
Bed Mobility Rolling [] [] [x] [] [] [] [] [] [] [] [] Supine to Sit [] [] [x] [] [] [] [] [] [] [] [] Scooting [] [] [x] [] [] [] [] [] [] [] [] Sit to Supine [] [] [x] [] [] [] [] [] [] [] []   
Transfers Sit to Stand [] [] [] [] [x] [] [] [] [] [] [] Bed to Chair [] [] [] [] [] [] [] [] [] [] []   
Stand to Sit [] [] [] [] [x] [] [] [] [] [] [] I=Independent, Mod I=Modified Independent, S=Supervision, SBA=Standby Assistance, CGA=Contact Guard Assistance,  
Min=Minimal Assistance, Mod=Moderate Assistance, Max=Maximal Assistance, Total=Total Assistance, NT=Not Tested GAIT: I Mod I S SBA CGA Min Mod Max Total  NT x2 Comments:  
Level of Assistance [] [] [] [x] [] [] [] [] [] [] [] Distance 2x60' DME Gait Belt Gait Quality Slow and shuffled I=Independent, Mod I=Modified Independent, S=Supervision, SBA=Standby Assistance, CGA=Contact Guard Assistance,  
Min=Minimal Assistance, Mod=Moderate Assistance, Max=Maximal Assistance, Total=Total Assistance, NT=Not Tested PLAN:  
FREQUENCY/DURATION: PT Plan of Care: 3 times/week for duration of hospital stay or until stated goals are met, whichever comes first. 
TREATMENT:  
 
TREATMENT:  
($$ Therapeutic Activity: 23-37 mins    ) Therapeutic Activity (25 Minutes): Therapeutic activity included Supine to Sit, Sit to Supine, Transfer Training, Ambulation on level ground, Standing balance and LE exercises to improve functional Mobility, Strength, ROM and Activity tolerance. Date: 
1/4 Date: 
1/5 Date: Activity/Exercise Parameters Parameters Parameters LAQs 3x10 3 x 10 Level ground ambulation 3 x15 ft 3 x 30 ft   
 Seated Marches 3 x10 X 10 STSs  2 x 4   
     
     
     
 
 
 
 
AFTER TREATMENT POSITION/PRECAUTIONS: 
Bed, Needs within reach and RN notified INTERDISCIPLINARY COLLABORATION: 
RN/PCT and PT/PTA TOTAL TREATMENT DURATION: 
PT Patient Time In/Time Out Time In: 6699 Time Out: 1430 Kayla Freeman, PTA

## 2021-01-06 NOTE — PROGRESS NOTES
Hospitalist Progress Note 2021 Admit Date: 2021  9:55 AM  
NAME: Hussain Solorzano :  1945 MRN:  [id number] Attending: Meg Tsang MD 
PCP:  Trisha Samson MD 
 
SUBJECTIVE:  
Patient is a 70year old CM w/ a PMHx of CAD s/p CABG, DMII presented with SOB. Pt stated that he started having SOB associated with fatigue, myalgias, cough, fever and chills about 5 days ago. He went to the ED 2 days later and was tested for Posive for Covid (). He was sent home on antibiotics at that time as he did not require supplemental oxygen at rest and on exertion. Pt stated in the past several days symptoms have gotten worse. He had a T 102 PTA and checked his O2 sat which dropped down to the 79%, prompting him to call EMS. In the ED, pt was placed on 2L with O2sat>90%. CBC and CMP at baseline. Procal negative. CXR shows bibasilar atelectasis or pneumonia worse in the interval. Pt was given dexamethasone, azithromycin and Ceftriaxone in ED. 
 
: changed over to Airvo overnight, on 70% FiO2. Felt poorly overnight, has been feeling much better since them. No cp, no n/v. Review of Systems negative with exception of pertinent positives noted above PHYSICAL EXAM  
 
Visit Vitals /66 Pulse 67 Temp 98 °F (36.7 °C) Resp 20 Ht 5' 10\" (1.778 m) Wt 96.6 kg (213 lb) SpO2 92% BMI 30.56 kg/m² Temp (24hrs), Av.9 °F (36.6 °C), Min:97.2 °F (36.2 °C), Max:98.1 °F (36.7 °C) Oxygen Therapy O2 Sat (%): 92 % (21) Pulse via Oximetry: 84 beats per minute (21) O2 Device: Heated; Hi flow nasal cannula (21) O2 Flow Rate (L/min): 50 l/min (21) O2 Temperature: 87.8 °F (31 °C) (21) FIO2 (%): 70 % (21 1126) Intake/Output Summary (Last 24 hours) at 2021 1132 Last data filed at 2021 1129 Gross per 24 hour Intake 600 ml Output 400 ml Net 200 ml General: No acute distress Lungs:  Coarse lung sounds Bilaterally. Heart:  Regular rate and rhythm,  No murmur, rub, or gallop Abdomen: Soft, Non distended, Non tender, Positive bowel sounds Extremities: No cyanosis, clubbing or edema Neurologic:  No focal deficits XR CHEST SNGL V Final Result Impression: Improving bilateral parenchymal opacities. XR CHEST PORT Final Result IMPRESSION: Bibasilar atelectasis or pneumonia worse in the interval.  
  
 
 
 
ASSESSMENT Active Hospital Problems Diagnosis Date Noted  Leukocytosis 01/04/2021  Acute respiratory failure with hypoxia ([facility] 01/01/2021  Pneumonia due to COVID-19 virus 01/01/2021  Mixed hyperlipidemia 01/01/2021  Gastroesophageal reflux disease 01/01/2021  Stage 3a chronic kidney disease 01/01/2021  Vitamin D deficiency 01/01/2021  Other chest pain 04/27/2020  Coronary artery disease involving native coronary artery of native heart with angina pectoris ([facility] 04/21/2020 [facility] Cardio. Abn ETT led to 3v CABG. 1990s?  Type 2 diabetes mellitus ([facility] 07/23/2018  Essential hypertension 09/15/2010 Last Assessment & Plan:  
Patient is stable on medications Plan: 
 
Acute respiratory failure with hypoxia 2/2 Covid-19 Pneumonia O2 sat at home 79% on RA and placed on 2L NC in ED. COVID POSITIVE on 12/30. CXR shows b/l atelectasis vs pneumonia. S/p plasma Dexamethasone for 10 days (1/1-1/10) Remdesivir for 5 days Prone as tolerated SSI while on steroids. Added basal and premeal. 
Oxygen supplementation--now on airvo, wean as tolerated. Leukocytosis: most likely 2/2 steroids. Elevated LFT: AST>ALT (wnl). Most likely secondary to viral infection. Improving. Hold Remdesivir if ALT>250. CAD s/p CABG: Cont home ASA, statin, lisinopril metoprolol T2DM: hold home Actos. On SSI w/ POC BG qAC/HS. Added lantus. HTN: Cont home Lisinopril and metoprolol GERD: Pepcid BID Stage 3 CKD: Cr at baseline HLD: Cont home statin Vit D Def: Cont home supplements 
  
DVT PPx: Lovenox SQ Code Status: FULL 
  
Anticipated discharge: Pending clinical course as still on high O2. PT/OT ordered. Signed By: Elena Braxton MD   
 January 6, 2021

## 2021-01-07 NOTE — PROGRESS NOTES
ACUTE OT GOALS: 
(Developed with and agreed upon by patient and/or caregiver.) 1) Patient will complete lower body bathing and dressing with modified independence and adaptive equipment as needed. 2) Patient will complete toileting with modified independence. 3) Patient will complete functional transfers with modified independence and adaptive equipment as needed. 4) Patient will tolerate at least 20 minutes of OT activity with as needed rest breaks while maintaining O2 sats >90%. 5) Patient will verbalize at least 3 energy conservation technique to utilize during ADL/IADL. Timeframe: 7 visits OCCUPATIONAL THERAPY: Daily Note OT Treatment Day # 2 Sami Olivo is a 70 y.o. male PRIMARY DIAGNOSIS: Pneumonia due to COVID-19 virus Acute respiratory failure with hypoxia ([facility] [address] [J96.01] Pneumonia due to COVID-19 virus [U07.1, J12.82] Payor: [employer] / Plan[place] [employer] / Product Type: Managed Care Medicaid /  
ASSESSMENT:  
 
REHAB RECOMMENDATIONS: CURRENT LEVEL OF FUNCTION: 
(Most Recently Demonstrated) Recommendation to date pending progress: 
Setting: ? Home Health Therapy Equipment: ? To Be Determined Bathing: ? Minimal Assistance Dressing: ? Minimal Assistance Feeding/Grooming: ? Minimal Assistance Toileting: ? Minimal Assistance Functional Mobility: 
? Contact Guard Assistance ASSESSMENT: 
Mr. Shantanu Jean is a previously functionally independent 70year old male admitted with fever and hypoxia due to COVID-19 pneumonia. His oxygen requirements have increased significantly since initial evaluation, and he seems less peppy today. He participated in seated ADLs with rest breaks required for O2 status. No goals met today. Will continue efforts. .  
 
SUBJECTIVE:  
Mr. Shantanu Jean states, \"I'm feeling better today than I was yesterday. .\" SOCIAL HISTORY/LIVING ENVIRONMENT: Lives with wife, independent with ADLs, active.   
  
 
OBJECTIVE:  
 
 PAIN: VITAL SIGNS: LINES/DRAINS:  
Pre Treatment: Pain Screen Pain Scale 1: Numeric (0 - 10) Pain Intensity 1: 0 Post Treatment: 0  92% on Airvo @ rest  IV 
O2 Device: Heated, Hi flow nasal cannula ACTIVITIES OF DAILY LIVING: I Mod I S SBA CGA Min Mod Max Total NT Comments BASIC ADLs:             
Bathing/ Showering [] [] [] [] [x] [] [] [] [] [] Upper body bathing, lower body bathing from seated position; CGA for standing Toileting [] [] [] [] [] [] [] [] [] [x] Dressing [] [] [] [x] [] [] [] [] [] [] [place] gown Feeding [] [] [x] [] [] [] [] [] [] []   
Grooming [] [] [x] [] [] [] [] [] [] [] face Personal Device Care [] [] [] [] [] [] [] [] [] [x] Functional Mobility [] [] [] [] [x] [] [] [] [] [] I=Independent, Mod I=Modified Independent, S=Supervision, SBA=Standby Assistance, CGA=Contact Guard Assistance,  
Min=Minimal Assistance, Mod=Moderate Assistance, Max=Maximal Assistance, Total=Total Assistance, NT=Not Tested MOBILITY: I Mod I S SBA CGA Min Mod Max Total  NT x2 Comments:  
Supine to sit [] [] [] [] [] [] [] [] [] [x] [] Sit to supine [] [] [] [] [] [] [] [] [] [x] [] Sit to stand [] [] [] [] [x] [] [] [] [] [] [] Bed to chair [] [] [] [] [] [] [] [] [] [x] [] I=Independent, Mod I=Modified Independent, S=Supervision, SBA=Standby Assistance, CGA=Contact Guard Assistance,  
Min=Minimal Assistance, Mod=Moderate Assistance, Max=Maximal Assistance, Total=Total Assistance, NT=Not Tested PLAN:  
FREQUENCY/DURATION: OT Plan of Care: 3 times/week for duration of hospital stay or until stated goals are met, whichever comes first. 
 
TREATMENT:  
TREATMENT:  
($$ Self Care/Home Management: 23-37 mins    ) Self Care (27 Minutes): Self care including Upper Body Bathing, Lower Body Bathing, Upper Body Dressing and Grooming to increase independence and activity tolerance for ADLs.  
 
AFTER TREATMENT POSITION/PRECAUTIONS: 
 Chair, Needs within reach and RN notified INTERDISCIPLINARY COLLABORATION: 
RN/PCT and OT/MARTIN TOTAL TREATMENT DURATION: 
OT Patient Time In/Time Out Time In: 46 Time Out: 1503 Wes Butler OTR/L

## 2021-01-07 NOTE — DIABETES MGMT
Patient admitted with pneumonia due to COVID-19 virus. Patient has received diabetes education regarding insulin instruction see notes for details. Blood glucose ranged 129-231 yesterday with patient receiving Lantus 30 units, Humalog 41 units, and Decadron 6mg. Blood glucose this morning was 320. Reviewed patient current regimen: Lantus 30 units nightly, Humalog 11 units with meals, and Humalog SSI. Patient has consistent carb diet ordered. Will continue to follow along.

## 2021-01-07 NOTE — PROGRESS NOTES
ACUTE PHYSICAL THERAPY GOALS: 
(Developed with and agreed upon by patient and/or caregiver.) STG: 
(1.)Mr. Bety Chapman will move from supine to sit and sit to supine , scoot up and down and roll side to side with SUPERVISION within 4 treatment day(s). (2.)Mr. Bety Chapman will transfer from bed to chair and chair to bed with SUPERVISION using the least restrictive device within 4 treatment day(s). (3.)Mr. Bety Chapman will ambulate with SUPERVISION for 100 feet with the least restrictive device within 4 treatment day(s).  
  
LTG: 
(1.)Mr. Bety Chapman will move from supine to sit and sit to supine , scoot up and down and roll side to side in bed with MODIFIED INDEPENDENCE within 7 treatment day(s). (2.)Mr. Bety Chapman will transfer from bed to chair and chair to bed with MODIFIED INDEPENDENCE using the least restrictive device within 7 treatment day(s). (3.)Mr. Bety Chapman will ambulate with MODIFIED INDEPENDENCE for 200 feet with the least restrictive device within 7 treatment day(s). ________________________________________________________________________________________________ PHYSICAL THERAPY: Daily Note and PM Treatment Day # 5 Geneva Andrews is a 70 y.o. male PRIMARY DIAGNOSIS: Pneumonia due to COVID-19 virus [facility] failure with [facility] [J96.01] Pneumonia due to COVID-19 virus [U07.1, J12.82] ASSESSMENT:  
 
REHAB RECOMMENDATIONS: CURRENT LEVEL OF FUNCTION: 
(Most Recently Demonstrated) Recommendation to date pending progress: 
Setting: 
? No further skilled therapy Equipment:  
? None Bed Mobility: 
? Supervision Sit to Stand: 
? Supervision Transfers: 
? Supervision Gait/Mobility: 
? Supervision ASSESSMENT: 
Mr. Bety Chapman is making steady progress towards PT goals as noted by increased gait distance and activity tolerance. Will continue PT efforts. SUBJECTIVE:  
Mr. Bety Chapman states, \"Excuse me\" SOCIAL HISTORY/ LIVING ENVIRONMENT:  
  
OBJECTIVE:  
 
 PAIN: VITAL SIGNS: LINES/DRAINS:  
Pre Treatment: Pain Screen Pain Scale 1: FLACC Pain Intensity 1: 0 Post Treatment: 0   None O2 Device: Heated, Hi flow nasal cannula MOBILITY: I Mod I S SBA CGA Min Mod Max Total  NT x2 Comments:  
Bed Mobility Rolling [] [] [x] [] [] [] [] [] [] [] [] Supine to Sit [] [] [x] [] [] [] [] [] [] [] [] Scooting [] [] [x] [] [] [] [] [] [] [] [] Sit to Supine [] [] [x] [] [] [] [] [] [] [] []   
Transfers Sit to Stand [] [] [x] [] [x] [] [] [] [] [] [] Bed to Chair [] [] [x] [] [] [] [] [] [] [] []   
Stand to Sit [] [] [x] [] [] [] [] [] [] [] [] I=Independent, Mod I=Modified Independent, S=Supervision, SBA=Standby Assistance, CGA=Contact Guard Assistance,  
Min=Minimal Assistance, Mod=Moderate Assistance, Max=Maximal Assistance, Total=Total Assistance, NT=Not Tested GAIT: I Mod I S SBA CGA Min Mod Max Total  NT x2 Comments:  
Level of Assistance [] [] [] [x] [] [] [] [] [] [] [] Distance 90' x 2 DME Gait Belt Gait Quality Slow I=Independent, Mod I=Modified Independent, S=Supervision, SBA=Standby Assistance, CGA=Contact Guard Assistance,  
Min=Minimal Assistance, Mod=Moderate Assistance, Max=Maximal Assistance, Total=Total Assistance, NT=Not Tested PLAN:  
FREQUENCY/DURATION: PT Plan of Care: 3 times/week for duration of hospital stay or until stated goals are met, whichever comes first. 
TREATMENT:  
 
TREATMENT:  
($$ Therapeutic Activity: 23-37 mins    ) Therapeutic Activity (25 Minutes): Therapeutic activity included Supine to Sit, Transfer Training, Ambulation on level ground, Standing balance and LE exercises to improve functional Mobility, Strength, ROM, Activity tolerance and balance. Date: 
1/4 Date: 
1/5 Date: Activity/Exercise Parameters Parameters Parameters LAQs 3x10 3 x 10 Level ground ambulation 3 x15 ft 3 x 30 ft Seated Marches 3 x10 X 10 STSs  2 x 4   
     
     
     
 
 
 
 
 AFTER TREATMENT POSITION/PRECAUTIONS: 
Bed, Needs within reach and RN notified INTERDISCIPLINARY COLLABORATION: 
RN/PCT and PT/PTA TOTAL TREATMENT DURATION: 
PT Patient Time In/Time Out Time In: 9603 Time Out: 1410 Makayla Freeman PTA

## 2021-01-07 NOTE — PROGRESS NOTES
Hospitalist Progress Note 2021 Admit Date: 2021  9:55 AM  
NAME: Dayo Romero :  1945 MRN:  [id number] Attending: Magnus Bueno MD 
PCP:  Kerline Ferris., MD 
 
SUBJECTIVE:  
Patient is a 70year old CM w/ a PMHx of CAD s/p CABG, DMII presented with SOB. Pt stated that he started having SOB associated with fatigue, myalgias, cough, fever and chills about 5 days ago. He went to the ED 2 days later and was tested for Posive for Covid (). He was sent home on antibiotics at that time as he did not require supplemental oxygen at rest and on exertion. Pt stated in the past several days symptoms have gotten worse. He had a T 102 PTA and checked his O2 sat which dropped down to the 79%, prompting him to call EMS. In the ED, pt was placed on 2L with O2sat>90%. CBC and CMP at baseline. Procal negative. CXR shows bibasilar atelectasis or pneumonia worse in the interval. Pt was given dexamethasone, azithromycin and Ceftriaxone in ED. 
 
: 
Remains on 70% airvo Low energy, no dyspnea, rare cough. No cp, no n/v. Review of Systems negative with exception of pertinent positives noted above PHYSICAL EXAM  
 
Visit Vitals /73 Pulse 60 Temp 98.2 °F (36.8 °C) Resp 20 Ht 5' 10\" (1.778 m) Wt 96.6 kg (213 lb) SpO2 95% BMI 30.56 kg/m² Temp (24hrs), Av.2 °F (36.8 °C), Min:97.8 °F (36.6 °C), Max:98.4 °F (36.9 °C) Oxygen Therapy O2 Sat (%): 95 % (21 1205) Pulse via Oximetry: 59 beats per minute (21 111) O2 Device: Heated; Hi flow nasal cannula (21) O2 Flow Rate (L/min): 50 l/min (21 111) O2 Temperature: 87.8 °F (31 °C) (21 1117) FIO2 (%): 75 % (21 1117) Intake/Output Summary (Last 24 hours) at 2021 1427 Last data filed at 2021 1206 Gross per 24 hour Intake 0 ml Output 2075 ml Net -2075 ml General: No acute distress Lungs:  Coarse lung sounds Bilaterally. Heart:  Regular rate and rhythm,  No murmur, rub, or gallop Abdomen: Soft, Non distended, Non tender, Positive bowel sounds Extremities: No cyanosis, clubbing or edema Neurologic:  No focal deficits XR CHEST SNGL V Final Result Impression: Improving bilateral parenchymal opacities. XR CHEST PORT Final Result IMPRESSION: Bibasilar atelectasis or pneumonia worse in the interval.  
  
 
 
 
ASSESSMENT Active Hospital Problems Diagnosis Date Noted  Leukocytosis 01/04/2021  Acute respiratory failure with hypoxia ([facility] [address] 01/01/2021  Pneumonia due to COVID-19 virus 01/01/2021  Mixed hyperlipidemia 01/01/2021  Gastroesophageal reflux disease 01/01/2021  Stage 3a chronic kidney disease 01/01/2021  Vitamin D deficiency 01/01/2021  Other chest pain 04/27/2020  Coronary artery disease involving native coronary artery of native heart with angina pectoris ([facility] [address] 04/21/2020 [facility] Card[facility]. Abn ETT led to 3v CABG. 1990s?  Type 2 diabetes mellitus ([facility] [address] 07/23/2018  Essential hypertension 09/15/2010 Last Assessment & Plan:  
Patient is stable on medications Plan: 
 
Acute respiratory failure with hypoxia 2/2 Covid-19 Pneumonia O2 sat at home 79% on RA and placed on 2L NC in ED. COVID POSITIVE on 12/30. CXR shows b/l atelectasis vs pneumonia. S/p plasma Dexamethasone for 10 days (1/1-1/10) Remdesivir for 5 days Prone as tolerated SSI while on steroids. Added basal and premeal. 
Oxygen supplementation--now on airvo, wean as tolerated. Leukocytosis: most likely 2/2 steroids. Elevated LFT: AST>ALT (wnl). Most likely secondary to viral infection. Improving. Hold Remdesivir if ALT>250. CAD s/p CABG: Cont home ASA, statin, lisinopril metoprolol T2DM: hold home Actos. On SSI w/ POC BG qAC/HS. Added lantus. HTN: Cont home Lisinopril and metoprolol GERD: Pepcid BID Stage 3 CKD: Cr at baseline HLD: Cont home statin Vit D Def: Cont home supplements 
  
DVT PPx: Lovenox SQ Code Status: FULL 
  
Anticipated discharge: Pending clinical course as still on high O2. PT/OT ordered. Signed By: Nataliya Malhotra MD   
 January 7, 2021

## 2021-01-08 NOTE — PROGRESS NOTES
When getting the 1900 vitals, nurse aide informed me that his O2 saturation was 78% on Airvo 50L/75%. O2 sat was checked in 5 different fingers. Patient stated that he did not feel short of breath, had no trouble trying to breathe, and did not have any light headedness. Respiratory came to the bedside, patient placed on nonrebreather mask, maxed out on the Airvo (60L/100%) and still only satting at 88/89%. Notified Dr. Benito Medellin via [address] [place] [address], spoke with him on the phone as well. Orders received for a stat ABG, respiratory also placed the patient on a continuous pulse ox. Will continue to monitor.

## 2021-01-08 NOTE — PROGRESS NOTES
PT Daily Note: 
HOLD PT per RN due to respiratory status. Will check back on patient at a later date/time if schedule permits.  
Thank you, 
Heydi Hernandez, PTA

## 2021-01-08 NOTE — PROGRESS NOTES
Assumed care at [address] [place] [address]. A&Ox4. Resting in bed at this time. Airvo at [facility] at 100%. No s/sx of distress at this time. Droplet plus isolation due to positive COVID 19 test result and proper PPE worn. Call light within reach and is encouraged to call for assistance if needed. Will continue to monitor.

## 2021-01-08 NOTE — PROGRESS NOTES
Report given to [facility]. Patient resting quietly in bed. Respirations present, even and unlabored on Airvo 60L/100%. Bed low and locked, safety measures in place. No signs of distress, no needs expressed by the patient. Call light within reach, encouraged patient to call with any needs.

## 2021-01-08 NOTE — PROGRESS NOTES
01/08/21 6618 Oxygen Therapy O2 Sat (%) 97 % Pulse via Oximetry 52 beats per minute O2 Device BIPAP Respiratory Respiratory (WDL) X  
CPAP/BIPAP  
CPAP/BIPAP Start/Stop On Device Mode BIPAP  
$$ Bipap Daily Yes Mask Type and Size Full face; Medium Skin Condition Intact PIP Observed 18 cm H20 IPAP (cm H2O) 18 cm H2O  
EPAP (cm H2O) 10 cm H2O Inspiratory Time (sec) 1 seconds Vt Spont (ml) 980 ml Ve Observed (l/min) 25.4 l/min Backup Rate 14 Total RR (Spontaneous) 24 breaths per minute Insp Rise Time (sec) 1 Leak (Estimated) 28 L/min  
Pt's Home Machine No  
Biomedical Check Performed Yes Settings Verified Yes Alarm Settings High Pressure 30 Low Pressure 5 Apnea 20 Low Ve 2 High Rate 50 Low Rate 8 Pulmonary Toilet Pulmonary Toilet H. O.B elevated Patient placed on Q BiPAP. Resting comfortably at this time. RN notified.

## 2021-01-08 NOTE — DIABETES MGMT
Patient admitted with pneumonia due to COVID-19 virus. Blood glucose ranged 195-334 yesterday with patient receiving Lantus 30 units, Humalog 57 units, and Decadron 6mg. Blood glucose this morning was 202. Per chart review patient needed bipap for a few hours last night see chart notes for details. Patient not appropriate for education at this time, given patient condition, will follow along loosely.

## 2021-01-08 NOTE — PROGRESS NOTES
Patient resting. Unable to take off non re breather during shift. Preparing to give report to oncoming nurse.

## 2021-01-08 NOTE — PROGRESS NOTES
Comprehensive Nutrition Assessment Type and Reason for Visit: Initial, RD nutrition re-screen/LOS Nutrition Recommendations/Plan:  
? Continue current diet. ? Add ensure enlive TID with meals - provides 350 kcal and 20 gm PRO per bottle vs glucerna shakes providing 220 kcal and 10 gm PRO per bottle. Utilize ensure enlive for more nutrition while PO intake of meals is poor. ? Pursue nutrition support if PO intake remains compromised d/t respiratory status, and if within goals of care. Malnutrition Assessment: 
Malnutrition Status: At risk for malnutrition (specify)(Unable to take in PO d/t decline in respiratory status.) Nutrition Assessment:  
Nutrition History: Unable to obtain nutrition history d/t current medical condition. Nutrition Background: Pt admitted with fever, cough, body aches. Covid 19 + (12/30). PMH notable for CAD with CABG, DM, GERD, HTN, CKD. Daily Update: 
Unable to speak with pt d/t noted decline in respiratory status. Per MD notes, decompensating with mask removal for PO intake. Noted goals of care with no plans to intubate. Labs: POC glucose 202,182 mg/dl - receiving lantus, SSI, Humalog QID, and on steroids. Abdominal Status (last documented): Last BM 01/07/21. Nutrition Related Findings:  
NFPE deferred d/t covid 19 precautions. Per MD notes (1/8), unable to take off mask for PO intake without desaturating. Current Nutrition Therapies: DIET DIABETIC CONSISTENT CARB Regular Current Intake: Average Meal Intake: (variable recorded PO intake (0-100%)) Average Supplement Intake: None ordered Anthropometric Measures: 
Height: 5' 10\" (177.8 cm) Current Body Wt: 73.9 kg (162 lb 14.7 oz)(1/8), Weight source: Not specified BMI: 23.4, Admission Body Weight: 212 lb 15.4 oz(1/1, pt stated) Ideal Body Wt: 166 lbs (75 kg), 98.1 % Usual Body Wt: (unknown. ), Estimated Daily Nutrient Needs: Energy (kcal/day): 1014-4614(25-72) (Kcal/kg, Weight Used: Ideal(75.4 kg)) Protein (g/day): 60-90(0.8-1.2) Weight Used: (Ideal(75.4 kg)) Nutrition Diagnosis: · Predicted inadequate energy intake related to impaired respiratory function as evidenced by (variable PO intake, desaturating with PO intake today.) Nutrition Interventions:  
Food and/or Nutrient Delivery: Continue current diet, Start oral nutrition supplement Coordination of Nutrition Care: Continue to monitor while inpatient Goals: Active Goal: Meet >75% of estimated needs within 7 days. Nutrition Monitoring and Evaluation:  
  
Food/Nutrient Intake Outcomes: Food and nutrient intake, Supplement intake Physical Signs/Symptoms Outcomes: Biochemical data, Chewing or swallowing, Hemodynamic status Discharge Planning: Too soon to determine Nani Carney [address], [address] [place] [address], [facility] [address] [place], [facility] [address] Disaster Mode active

## 2021-01-08 NOTE — PROGRESS NOTES
Report received from [facility]. Patient resting quietly in bed. Respirations present even and unlabored on Airvo 50L/75%. Bed low and locked, safety measures in place. No signs of distress, no needs expressed. Call light within reach, encouraged patient to call with any needs. Will continue to monitor.

## 2021-01-08 NOTE — CONSULTS
CONSULT NOTE Ming Telles 1/8/2021 Date of Admission:  1/1/2021 The patient's chart is reviewed and the patient is discussed with the staff. Subjective:  
 
Patient is a 70 y.o.  male seen and evaluated at the request of Dr. Keiko Richmond. He developed a recent fever with cough, fatigue, shortness of breath, fever and chills. He tested positive for COVID on 12/30. He was seen in the ER and given a script for antibiotics and discharged home. He represented on 1/1 with ongoing fever and overall, feeling worse. His CXR shows bilateral infiltrates. He has been started on Rocephin, Zithroma, Remdesivir, Vit D and Decadron. He was given plasma on 1/1. His oxygen needs have gradually increased and he is now on airvo with 100% O2 - a nonrebreather was added this morning for additional oxygen needs. He was on Bipap some yesterday. He is a DNR. His D dimer is 1.6. Review of Systems A comprehensive review of systems was negative except for: Constitutional: positive for fevers, chills and malaise Eyes: positive for contacts/glasses Ears, nose, mouth, throat, and face: positive for none Respiratory: positive for cough or dyspnea Cardiovascular: positive for none Gastrointestinal: positive for reflux symptoms Genitourinary: positive for none Integument/breast: positive for none Hematologic/lymphatic: positive for none Musculoskeletal: positive for myalgias Neurological: positive for none Behvioral/Psych: positive for none Endocrine: positive for diabetes Allergic/Immunologic: positive for none Patient Active Problem List  
Diagnosis Code  Other chest pain R07.89  
 Acute respiratory failure with hypoxia (HCC) J96.01  
 Pneumonia due to COVID-19 virus U07.1, J12.82  
 Coronary artery disease involving native coronary artery of native heart with angina pectoris [facility] I25.119  Type 2 diabetes mellitus (HCC) E11.9  Mixed hyperlipidemia E78.2  Gastroesophageal reflux disease K21.9  Essential hypertension I10  Stage 3a chronic kidney disease N18.31  
 Vitamin D deficiency E55.9  Leukocytosis D72.829 Prior to Admission Medications Prescriptions Last Dose Informant Patient Reported? Taking?  
ascorbic acid (VITAMIN C) 500 mg tablet   Yes No  
Sig: Take 500 mg by mouth daily. Stop seven days prior to surgery per anesthesia protocol. aspirin 81 mg Tab   Yes No  
Sig: Take 81 mg by mouth nightly. atorvastatin (LIPITOR) 40 mg tablet   Yes No  
Sig: Take 40 mg by mouth nightly. cholecalciferol (VITAMIN D3) 1,000 unit cap   Yes No  
Sig: Take 5,000 Units by mouth daily. Stop seven days prior to surgery per anesthesia protocol. doxycycline (VIBRA-TABS) 100 mg tablet   No No  
Sig: Take 1 Tab by mouth two (2) times a day for 7 days. lisinopril (PRINIVIL, ZESTRIL) 10 mg tablet   Yes No  
Sig: Take 10 mg by mouth nightly. metoprolol tartrate (LOPRESSOR) 25 mg tablet   Yes No  
Sig: Take 25 mg by mouth nightly. multivitamin (ONE A DAY) tablet   Yes No  
Sig: Take 1 Tab by mouth daily. Stop seven days prior to surgery per anesthesia protocol. pioglitazone (Actos) 45 mg tablet   Yes No  
Sig: Take  by mouth. 1/2 tab po daily Facility-Administered Medications: None Past Medical History:  
Diagnosis Date  Arthritis  CAD (coronary artery disease) CABG 1992 - no heart cath since - has not seen a cardiologist since 2008  Diabetes ([facility] type 2, adverage glucose , symptomatic is unknown, unknown A1C level  Full dentures  Gastroesophageal reflux disease 1/1/2021  Hepatitis C   
 2005  High cholesterol  Hypertension  Pneumonia due to COVID-19 virus 1/1/2021  Type 2 diabetes mellitus ([facility] 7/23/2018 Active Ambulatory Problems Diagnosis Date Noted  Other chest pain 04/27/2020 Resolved Ambulatory Problems Diagnosis Date Noted  No Resolved Ambulatory Problems Past Medical History:  
Diagnosis Date  Arthritis  CAD (coronary artery disease)  Diabetes ([facility]  Full dentures  Gastroesophageal reflux disease 1/1/2021  Hepatitis C   
 High cholesterol  Hypertension  Pneumonia due to COVID-19 virus 1/1/2021  Type 2 diabetes mellitus ([facility] 7/23/2018 Past Surgical History:  
Procedure Laterality Date  HX COLONOSCOPY    
 HX KNEE ARTHROSCOPY Left   
 times 3   
 HX KNEE REPLACEMENT Bilateral   
 HX REFRACTIVE SURGERY Right  CO CARDIAC SURG PROCEDURE UNLIST    
 triple bypass Social History Socioeconomic History  Marital status:  Spouse name: Not on file  Number of children: Not on file  Years of education: Not on file  Highest education level: Not on file Occupational History  Not on file Social Needs  Financial resource strain: Not on file  Food insecurity Worry: Not on file Inability: Not on file  Transportation needs Medical: Not on file Non-medical: Not on file Tobacco Use  Smoking status: Never Smoker  Smokeless tobacco: Never Used Substance and Sexual Activity  Alcohol use: Yes Alcohol/week: 4.0 standard drinks Types: 4 Cans of beer per week  Drug use: No  
 Sexual activity: Not on file Lifestyle  Physical activity Days per week: Not on file Minutes per session: Not on file  Stress: Not on file Relationships  Social connections Talks on phone: Not on file Gets together: Not on file Attends L[employer]-day service: Not on file Active member of club or organization: Not on file Attends meetings of clubs or organizations: Not on file Relationship status: Not on file  Intimate partner violence Fear of current or ex partner: Not on file Emotionally abused: Not on file Physically abused: Not on file Forced sexual activity: Not on file Other Topics Concern  Not on file Social History Narrative  Not on file Family History Problem Relation Age of Onset  Diabetes Mother  Kidney Disease Mother   
     only has one kidney  Cancer Mother   
     liver  Alcohol abuse Father Allergies Allergen Reactions  Codeine Hives Current Facility-Administered Medications Medication Dose Route Frequency  dexAMETHasone (DECADRON) tablet 6 mg  6 mg Oral Q12H  
 insulin glargine (LANTUS) injection 30 Units  30 Units SubCUTAneous QHS  insulin lispro (HUMALOG) injection 11 Units  11 Units SubCUTAneous TIDAC  insulin lispro (HUMALOG) injection   SubCUTAneous AC&HS  aspirin delayed-release tablet 81 mg  81 mg Oral QHS  atorvastatin (LIPITOR) tablet 40 mg  40 mg Oral QHS  cholecalciferol (VITAMIN D3) (1000 Units /25 mcg) tablet 1 Tab  1,000 Units Oral DAILY  lisinopriL (PRINIVIL, ZESTRIL) tablet 10 mg  10 mg Oral QHS  metoprolol tartrate (LOPRESSOR) tablet 25 mg  25 mg Oral QHS  multivitamin, tx-iron-ca-min (THERA-M w/ IRON) tablet 1 Tab  1 Tab Oral DAILY  sodium chloride (NS) flush 5-40 mL  5-40 mL IntraVENous Q8H  
 sodium chloride (NS) flush 5-40 mL  5-40 mL IntraVENous PRN  polyethylene glycol (MIRALAX) packet 17 g  17 g Oral DAILY PRN  promethazine (PHENERGAN) tablet 12.5 mg  12.5 mg Oral Q6H PRN Or  
 ondansetron (ZOFRAN) injection 4 mg  4 mg IntraVENous Q6H PRN  
 famotidine (PEPCID) tablet 20 mg  20 mg Oral BID  enoxaparin (LOVENOX) injection 40 mg  40 mg SubCUTAneous Q12H  
 acetaminophen (TYLENOL) tablet 650 mg  650 mg Oral Q6H PRN Or  
 acetaminophen (TYLENOL) suppository 650 mg  650 mg Rectal Q6H PRN  
 guaiFENesin-dextromethorphan (ROBITUSSIN DM) 100-10 mg/5 mL syrup 5 mL  5 mL Oral Q4H PRN  
 0.9% sodium chloride infusion 250 mL  250 mL IntraVENous PRN  
 ascorbic acid (vitamin C) (VITAMIN C) tablet 1,000 mg  1,000 mg Oral DAILY  zinc sulfate tablet 220 mg  220 mg Oral DAILY  dextrose 40% (GLUTOSE) oral gel 1 Tube  15 g Oral PRN  
 glucagon (GLUCAGEN) injection 1 mg  1 mg IntraMUSCular PRN  
 dextrose (D50W) injection syrg 12.5-25 g  25-50 mL IntraVENous PRN  
 albuterol (PROVENTIL HFA, VENTOLIN HFA, PROAIR HFA) inhaler 2 Puff  2 Puff Inhalation Q6H PRN Objective:  
 
Vitals:  
 01/08/21 0533 01/08/21 0754 01/08/21 0807 01/08/21 1137 BP:   108/65 119/71 Pulse:   63 64 Resp:   19 20 Temp:   98.2 °F (36.8 °C) 98.1 °F (36.7 °C) SpO2: 98% 90% (!) 87% 94% Weight:      
Height: PHYSICAL EXAM  
 
Constitutional:  the patient is well developed and in no acute distress HEENT:  Sclera clear, pupils equal, oral mucosa moist 
Lungs: rales bilaterally from posterior. Wearing airvo and nonrebreather - sat 93%. Respirations even and not labored. Able to carry on conversation Cardiovascular:  RRR without M,G,R 
Abd/GI: soft and non-tender; with positive bowel sounds. Ext: warm without cyanosis. There is no lower leg edema. Skin:  no jaundice or rashes, no wounds Neuro: no gross neuro deficits. Alert and oriented Musculoskeletal: moves all four extremities. No deformities. Psychiatric: calm. Does not appear anxious or depressed Chest X-ray:   
 
 
 
Recent Labs 01/08/21 
3740 01/07/21 
1899 01/06/21 
4222 WBC  --  11.5* 14.6* HGB  --  11.3* 13.1* HCT  --  33.7* 38.7* PLT  --  295 356 INR 1.2 1.2 1.1 Recent Labs 01/07/21 
5950 01/06/21 
0309 * 136  
K 4.9 4.5  
 107 * 218* CO2 21 19* BUN 35* 37* CREA 1.22 1.33  
CA 8.3 8.6 ALB 2.4* 2.9* Assessment:  (Medical Decision Making) Hospital Problems  Date Reviewed: 4/27/2020 Codes Class Noted POA Leukocytosis ICD-10-CM: R94.221 ICD-9-CM: 288.60  1/4/2021 Unknown Acute respiratory failure with hypoxia ([facility] ICD-10-CM: J96.01 
ICD-9-CM: 518.81  1/1/2021 Yes  * (Principal) Pneumonia due to COVID-19 virus ICD-10-CM: U07.1, J12.82 
 ICD-9-CM: 480.8  1/1/2021 Yes Mixed hyperlipidemia ICD-10-CM: E78.2 ICD-9-CM: 272.2  1/1/2021 Yes Gastroesophageal reflux disease ICD-10-CM: K21.9 ICD-9-CM: 530.81  1/1/2021 Yes Stage 3a chronic kidney disease ICD-10-CM: N18.31 
ICD-9-CM: 585.3  1/1/2021 Yes Vitamin D deficiency ICD-10-CM: E55.9 ICD-9-CM: 268.9  1/1/2021 Yes Other chest pain ICD-10-CM: R07.89 ICD-9-CM: 786.59  4/27/2020 Yes Coronary artery disease involving native coronary artery of native heart with angina pectoris ([facility] ICD-10-CM: I25.119 ICD-9-CM: 414.01, 413.9  4/21/2020 Yes Overview Signed 1/1/2021 11:17 AM by Nathaniel Ovalles DO [facility] Card[facility]. Abn ETT led to 3v CABG. 1990s? Type 2 diabetes mellitus (HCC) ICD-10-CM: E11.9 ICD-9-CM: 250.00  7/23/2018 Yes Essential hypertension ICD-10-CM: I10 
ICD-9-CM: 401.9  9/15/2010 Yes Overview Signed 1/1/2021 11:17 AM by Nathaniel Ovalles DO Last Assessment & Plan:  
Patient is stable on medications Plan:  (Medical Decision Making) 1. Will give a low dose of lasix today and reassess 2. Asked nurse to try and use straw beneath mask for nutrition ie ensure or boost 
3. DNR status confirmed with him 4. Currently with sat of 94% with 100% airvo and nonrebreather on. He is comfortable at present. Bipap in room if needed. 5. Continue Decadron. Rocephin, Zithromax and Remdesivir Caprice Horner NP More than 50% of time documented was spent face-to-face contact with the patient and in the care of the patient on the floor/unit where the patient is located Lungs:  coarse Heart:  RRR with no Murmur/Rubs/Gallops Additional Comments:  On 100 % airvo with NRB on top of it, Sat in low 90's He is DNR but agrees to trial of BIPAP Continue steroids, ABX Prognosis is not good I have spoken with and examined the patient. I agree with the above assessment and plan as documented.  
 
Shashank Horton MD

## 2021-01-08 NOTE — PROGRESS NOTES
IV infilatrated during CT scan and radiology was unable to get the chest CT. MD notified via [employer].

## 2021-01-08 NOTE — PROGRESS NOTES
Hospitalist Progress Note 2021 Admit Date: 2021  9:55 AM  
NAME: Pooja Godinez :  1945 MRN:  [id number] Attending: Rufino Garcia MD 
PCP:  Fuad Lambert., MD 
 
SUBJECTIVE:  
Patient is a 70year old CM w/ a PMHx of CAD s/p CABG, DMII presented with SOB. Pt stated that he started having SOB associated with fatigue, myalgias, cough, fever and chills about 5 days ago. He went to the ED 2 days later and was tested for Posive for Covid (). He was sent home on antibiotics at that time as he did not require supplemental oxygen at rest and on exertion. Pt stated in the past several days symptoms have gotten worse. He had a T 102 PTA and checked his O2 sat which dropped down to the 79%, prompting him to call EMS. In the ED, pt was placed on 2L with O2sat>90%. CBC and CMP at baseline. Procal negative. CXR shows bibasilar atelectasis or pneumonia worse in the interval. Pt was given dexamethasone, azithromycin and Ceftriaxone in ED. 
 
: 
Overnight increased airvo and needed to go on bipap for a few hours. He is back on airvo/nrb combo 100% FiO2 and desats with any movement or even taking mask off for a bit of food. Rapid breathing. Doesn't feel short of breath. The patient and his son via phone are updated on condition and options. They agree with bipap but affirm that he would not want vent or resuscitation. Review of Systems negative with exception of pertinent positives noted above PHYSICAL EXAM  
 
Visit Vitals /65 (BP 1 Location: Right arm, BP Patient Position: At rest) Pulse 63 Temp 98.2 °F (36.8 °C) Resp 19 Ht 5' 10\" (1.778 m) Wt 73.9 kg (163 lb) SpO2 (!) 87% BMI 23.39 kg/m² Temp (24hrs), Av.9 °F (36.6 °C), Min:97.6 °F (36.4 °C), Max:98.2 °F (36.8 °C) Oxygen Therapy O2 Sat (%): (!) 87 % (21 0807) Pulse via Oximetry: 60 beats per minute (21 075) O2 Device: Heated; Hi flow nasal cannula (21 7928) O2 Flow Rate (L/min): 60 l/min (01/08/21 0754) O2 Temperature: 87.8 °F (31 °C) (01/08/21 0533) FIO2 (%): 100 % (01/08/21 0754) Intake/Output Summary (Last 24 hours) at 1/8/2021 3210 Last data filed at 1/7/2021 5346 Gross per 24 hour Intake 240 ml Output 500 ml Net -260 ml General: No acute distress Lungs:  Coarse lung sounds Bilaterally. Heart:  Regular rate and rhythm,  No murmur, rub, or gallop Abdomen: Soft, Non distended, Non tender, Positive bowel sounds Extremities: No cyanosis, clubbing or edema Neurologic:  No focal deficits XR CHEST SNGL V Final Result Impression: Improving bilateral parenchymal opacities. XR CHEST PORT Final Result IMPRESSION: Bibasilar atelectasis or pneumonia worse in the interval.  
  
CT CHEST W CONT    (Results Pending) ASSESSMENT Active Hospital Problems Diagnosis Date Noted  Leukocytosis 01/04/2021  Acute respiratory failure with hypoxia ([facility] 01/01/2021  Pneumonia due to COVID-19 virus 01/01/2021  Mixed hyperlipidemia 01/01/2021  Gastroesophageal reflux disease 01/01/2021  Stage 3a chronic kidney disease 01/01/2021  Vitamin D deficiency 01/01/2021  Other chest pain 04/27/2020  Coronary artery disease involving native coronary artery of native heart with angina pectoris ([facility] 04/21/2020 [facility]. Abn ETT led to 3v CABG. 1990s?  Type 2 diabetes mellitus ([facility] 07/23/2018  Essential hypertension 09/15/2010 Last Assessment & Plan:  
Patient is stable on medications Plan: 
 
Acute respiratory failure with hypoxia 2/2 Covid-19 Pneumonia O2 sat at home 79% on RA and placed on 2L NC in ED. COVID POSITIVE on 12/30. CXR shows b/l atelectasis vs pneumonia. S/p plasma Dexamethasone for 10 days (1/1-1/10) S/p Remdesivir Proning as tolerated SSI while on steroids.  Added basal and premeal. 
 Oxygen supplementation--requiring every bit of 100% FiO2 right now. pulm consult. Would want bipap, not interested in intubation. D dimer flat, mild elevation. Leukocytosis: most likely 2/2 steroids. CAD s/p CABG: Cont home ASA, statin, lisinopril metoprolol T2DM: hold home Actos. On SSI w/ POC BG qAC/HS. Added lantus. HTN: Cont home Lisinopril and metoprolol GERD: Pepcid BID Stage 3 CKD: Cr at baseline HLD: Cont home statin Vit D Def: Cont home supplements 
  
DVT PPx: Lovenox SQ Code Status: DNR 
  
 
Signed By: Luis Honeycutt MD   
 January 8, 2021

## 2021-01-09 NOTE — PROGRESS NOTES
HARLEYAR received from [facility]. Patient resting quietly in bed watching TV. Respirations present on airvo and NRB, FiO2 100%, O2 saturation 90-91%, will desat to 88% while talking. No s/sx of distress and denies pain at this time. Requesting an ensure to drink. No other needs expressed at this time. Safety measures in place, call light in reach.

## 2021-01-09 NOTE — PROGRESS NOTES
Hospitalist Progress Note   
2021 Admit Date: 2021  9:55 AM  
NAME: Shelly Kruger :  1945 MRN:  [id number] Attending: Bing Hamlin MD 
PCP:  Selam Marmolejo MD 
 
SUBJECTIVE:  
Patient is a 70year old CM w/ a PMHx of CAD s/p CABG, DMII presented with SOB. Pt stated that he started having SOB associated with fatigue, myalgias, cough, fever and chills about 5 days ago. He went to the ED 2 days later and was tested for Posive for Covid (). He was sent home on antibiotics at that time as he did not require supplemental oxygen at rest and on exertion. Pt stated in the past several days symptoms have gotten worse. He had a T 102 PTA and checked his O2 sat which dropped down to the 79%, prompting him to call EMS. In the ED, pt was placed on 2L with O2sat>90%. CBC and CMP at baseline. Procal negative. CXR shows bibasilar atelectasis or pneumonia worse in the interval. Pt was given dexamethasone, azithromycin and Ceftriaxone in ED. 
 
: 
Remains on max dose airvo but hasn't needed to go back on bipap. Denies significant dyspnea. Has desaturations with conversation or any movement in bed. pulm following Review of Systems negative with exception of pertinent positives noted above PHYSICAL EXAM  
 
Visit Vitals /70 (BP 1 Location: Right arm, BP Patient Position: At rest) Pulse 89 Temp 97.8 °F (36.6 °C) Resp 22 Ht 5' 10\" (1.778 m) Wt 73.9 kg (163 lb) SpO2 90% BMI 23.39 kg/m² Temp (24hrs), Av.4 °F (36.3 °C), Min:96.3 °F (35.7 °C), Max:97.9 °F (36.6 °C) Oxygen Therapy O2 Sat (%): 90 % (21 1142) Pulse via Oximetry: 83 beats per minute (21 0754) O2 Device: Heated; Hi flow nasal cannula(plus NRB) (21 0754) O2 Flow Rate (L/min): 60 l/min (21) O2 Temperature: 87.8 °F (31 °C) (21) FIO2 (%): 100 % (21) Intake/Output Summary (Last 24 hours) at 2021 1206 Last data filed at 2021 9263 Gross per 24 hour Intake 0 ml Output 550 ml Net -550 ml General: No acute distress Lungs:  Coarse lung sounds Bilaterally. Heart:  Regular rate and rhythm,  No murmur, rub, or gallop Abdomen: Soft, Non distended, Non tender, Positive bowel sounds Extremities: No cyanosis, clubbing or edema Neurologic:  No focal deficits XR CHEST SNGL V Final Result Impression: Improving bilateral parenchymal opacities. XR CHEST PORT Final Result IMPRESSION: Bibasilar atelectasis or pneumonia worse in the interval.  
  
 
 
 
ASSESSMENT Active Hospital Problems Diagnosis Date Noted  Leukocytosis 01/04/2021  Acute respiratory failure with hypoxia ([facility] 01/01/2021  Pneumonia due to COVID-19 virus 01/01/2021  Mixed hyperlipidemia 01/01/2021  Gastroesophageal reflux disease 01/01/2021  Stage 3a chronic kidney disease 01/01/2021  Vitamin D deficiency 01/01/2021  Other chest pain 04/27/2020  Coronary artery disease involving native coronary artery of native heart with angina pectoris ([facility] 04/21/2020 [facility] Cardiol. [facility] ETT led to 3v CABG. 1990s?  Type 2 diabetes mellitus ([facility] 07/23/2018  Essential hypertension 09/15/2010 Last Assessment & Plan:  
Patient is stable on medications Plan: 
 
Acute respiratory failure with hypoxia 2/2 Covid-19 Pneumonia O2 sat at home 79% on RA and placed on 2L NC in ED. COVID POSITIVE on 12/30. CXR shows b/l atelectasis vs pneumonia. S/p plasma Dexamethasone for 10 days (1/1-1/10) S/p Remdesivir Proning as tolerated SSI while on steroids. Added basal and premeal. 
Oxygen supplementation--requiring every bit of 100% FiO2 right now. pulm following. Would want bipap, not interested in intubation. D dimer flat, mild elevation. Leukocytosis: most likely 2/2 steroids. CAD s/p CABG: Cont home ASA, statin, lisinopril metoprolol T2DM: hold home Actos. On SSI w/ POC BG qAC/HS. Added lantus. HTN: Cont home Lisinopril and metoprolol GERD: Pepcid BID Stage 3 CKD: Cr at baseline HLD: Cont home statin Vit D Def: Cont home supplements 
  
DVT PPx: Lovenox SQ Code Status: DNR 
  
 
Signed By: Monie Guajardo MD   
 January 9, 2021

## 2021-01-09 NOTE — PROGRESS NOTES
Patient resting quietly in bed. NRB mask sitting on top of head, O2 sat 82%. NRB mask placed back over patient's mouth and nose, O2 sats increased to 88%. Reinforced teaching of NRB mask placement and importance. Patient voiced understanding. No other s/sx of distress and denies pain at this time. Safety measures in place, call light in reach.

## 2021-01-09 NOTE — PROGRESS NOTES
Assumed care at [address] [place] [address]. A&Ox4. Resting in bed at this time. Airvo at [facility] at 100% with a non re breather. No s/sx of distress at this time. Droplet plus isolation due to positive COVID 19 test result and proper PPE worn. Call light within reach and is encouraged to call for assistance if needed. Will continue to monitor.

## 2021-01-09 NOTE — PROGRESS NOTES
RT placed airvo back on patient. When this nurse entered the room, patient's O2 saturation was 82% while on airvo 60L, 100%. NRB placed  on patient at this time. O2 saturation maintaining at 88%, RR 24. Patient reporting chest pain has decreased

## 2021-01-09 NOTE — PROGRESS NOTES
Jennifer Juan Admission Date: 1/1/2021 Daily Progress Note: 1/9/2021 (Pulmonary Team Day #2 Eval) The patient's chart is reviewed and the patient is discussed with the staff. Patient is a 70 y.o.  male seen and evaluated at the request of Dr. Aliyah Miller. He developed a recent fever with cough, fatigue, shortness of breath, fever and chills. He tested positive for COVID on 12/30. He was seen in the ER and given a script for antibiotics and discharged home. He represented on 1/1 with ongoing fever and overall, feeling worse. His CXR shows bilateral infiltrates. He has been started on Rocephin, Zithroma, Remdesivir, Vit D and Decadron. He was given plasma on 1/1. His oxygen needs fgradually increased and he was placed on airvo with 100% O2 - a nonrebreather was added on 1/8/2020 for additional oxygen needs. He was on Bipap on 1/7/2020. He is a DNR but agrees to Bipap for therapeutic managment. His D dimer is 1.6. Subjective:  
 
Denies shortness of breath at this point. Didn't sleep very well due to all the tubing/lines. Reports having urinated at least 3 times since the lasix at 1530 yesterday. No change in oxygen demands and does desat with extensive conversation. Currently on AIRVO 60L/100% as well as non-rebreather. Patient again stated he did not want to go to ICU. No use of Bipap within the past 24 hours. Current Facility-Administered Medications Medication Dose Route Frequency  dexAMETHasone (DECADRON) tablet 6 mg  6 mg Oral Q12H  
 insulin glargine (LANTUS) injection 30 Units  30 Units SubCUTAneous QHS  insulin lispro (HUMALOG) injection 11 Units  11 Units SubCUTAneous TIDAC  insulin lispro (HUMALOG) injection   SubCUTAneous AC&HS  aspirin delayed-release tablet 81 mg  81 mg Oral QHS  atorvastatin (LIPITOR) tablet 40 mg  40 mg Oral QHS  lisinopriL (PRINIVIL, ZESTRIL) tablet 10 mg  10 mg Oral QHS  metoprolol tartrate (LOPRESSOR) tablet 25 mg  25 mg Oral QHS  sodium chloride (NS) flush 5-40 mL  5-40 mL IntraVENous Q8H  
 sodium chloride (NS) flush 5-40 mL  5-40 mL IntraVENous PRN  polyethylene glycol (MIRALAX) packet 17 g  17 g Oral DAILY PRN  promethazine (PHENERGAN) tablet 12.5 mg  12.5 mg Oral Q6H PRN Or  
 ondansetron (ZOFRAN) injection 4 mg  4 mg IntraVENous Q6H PRN  
 famotidine (PEPCID) tablet 20 mg  20 mg Oral BID  enoxaparin (LOVENOX) injection 40 mg  40 mg SubCUTAneous Q12H  
 acetaminophen (TYLENOL) tablet 650 mg  650 mg Oral Q6H PRN Or  
 acetaminophen (TYLENOL) suppository 650 mg  650 mg Rectal Q6H PRN  
 guaiFENesin-dextromethorphan (ROBITUSSIN DM) 100-10 mg/5 mL syrup 5 mL  5 mL Oral Q4H PRN  
 0.9% sodium chloride infusion 250 mL  250 mL IntraVENous PRN  
 dextrose 40% (GLUTOSE) oral gel 1 Tube  15 g Oral PRN  
 glucagon (GLUCAGEN) injection 1 mg  1 mg IntraMUSCular PRN  
 dextrose (D50W) injection syrg 12.5-25 g  25-50 mL IntraVENous PRN  
 albuterol (PROVENTIL HFA, VENTOLIN HFA, PROAIR HFA) inhaler 2 Puff  2 Puff Inhalation Q6H PRN Review of Systems Constitutional: negative for fever, chills, sweats Cardiovascular: negative for chest pain, palpitations, syncope, edema Gastrointestinal:  negative for dysphagia, reflux, vomiting, diarrhea, abdominal pain, or melena Neurologic:  negative for focal weakness, numbness, headache Objective:  
 
Vitals:  
 01/09/21 6868 01/09/21 5640 01/09/21 0754 01/09/21 3112 BP:  128/74  122/76 Pulse:  89  91 Resp: 24 20  22 Temp:  97.6 °F (36.4 °C)  97.9 °F (36.6 °C) SpO2: (!) 88% 90% 95% 94% Weight:      
Height:      
 
 
 
Intake/Output Summary (Last 24 hours) at 1/9/2021 0801 Last data filed at 1/8/2021 2112 Gross per 24 hour Intake  Output 550 ml Net -550 ml Physical Exam:  
Constitution:  the patient is well developed and in no acute distress. Very pleasant. EENMT:  Sclera clear, pupils equal, oral mucosa moist 
Respiratory: Inspiratory / expiratory crackles half way up the posterior lungs Cardiovascular:  RRR without M,G,R 
Gastrointestinal: soft and non-tender; with positive bowel sounds. Musculoskeletal: warm without cyanosis. There is no lower extremity edema. Skin:  no jaundice or rashes, none wounds Neurologic: no gross neuro deficits Psychiatric:  alert and oriented x 3 CXR: Last CXR on 1/4/2021 LAB Recent Labs 01/09/21 
6953 01/08/21 
2053 01/08/21 
1629 01/08/21 
1104 01/08/21 
0536 GLUCPOC 346* 255* 179* 182* 202* Recent Labs 01/09/21 
6399 01/08/21 
8075 01/07/21 
1946 WBC  --   --  11.5* HGB  --   --  11.3* HCT  --   --  33.7*  
PLT  --   --  295 INR 1.1 1.2 1.2 Recent Labs 01/07/21 
9332 * K 4.9  CO2 21 * BUN 35* CREA 1.22  
CA 8.3 ALB 2.4* TBILI 0.9 ALT 41 Recent Labs 01/08/21 
0458 01/07/21 
2006 PHI 7.41 7.42  
PCO2I 31.7* 27.6*  
PO2I 78 64* HCO3I 20.2* 18.1* No results for input(s): LCAD, LAC in the last 72 hours. Assessment:  (Medical Decision Making) Hospital Problems  Date Reviewed: 4/27/2020 Codes Class Noted POA Leukocytosis ICD-10-CM: H14.933 ICD-9-CM: 288.60  1/4/2021 Unknown Acute respiratory failure with hypoxia ([facility] [address] ICD-10-CM: J96.01 
ICD-9-CM: 518.81  1/1/2021 Yes * (Principal) Pneumonia due to COVID-19 virus ICD-10-CM: U07.1, J12.82 ICD-9-CM: 480.8  1/1/2021 Yes Mixed hyperlipidemia ICD-10-CM: E78.2 ICD-9-CM: 272.2  1/1/2021 Yes Gastroesophageal reflux disease ICD-10-CM: K21.9 ICD-9-CM: 530.81  1/1/2021 Yes Stage 3a chronic kidney disease ICD-10-CM: N18.31 
ICD-9-CM: 585.3  1/1/2021 Yes Vitamin D deficiency ICD-10-CM: E55.9 ICD-9-CM: 268.9  1/1/2021 Yes Other chest pain ICD-10-CM: R07.89 ICD-9-CM: 786.59  4/27/2020 Yes Coronary artery disease involving native coronary artery of native heart with angina pectoris ([facility] [address] ICD-10-CM: I25.119 ICD-9-CM: 414.01, 413.9  4/21/2020 Yes Overview Signed 1/1/2021 11:17 AM by Татьяна Branch DO [facility] Cardiol. Abn ETT led to 3v CABG. 1990s? Type 2 diabetes mellitus (HCC) ICD-10-CM: E11.9 ICD-9-CM: 250.00  7/23/2018 Yes Essential hypertension ICD-10-CM: I10 
ICD-9-CM: 401.9  9/15/2010 Yes Overview Signed 1/1/2021 11:17 AM by Татьяна Branch DO Last Assessment & Plan:  
Patient is stable on medications Plan:  (Medical Decision Making) --Continue encouraging nutrition with ensure as the patient reports some hunger. --No significant improvement with low dose lasix. SPO2 at 94% when supine. Desats with conversation but does rebound. Placed on left sidelying with SPO2 stabilized at 92-93%. --Bipap in room is needed. No obvious signs of fatigue at this point 
--Continue decadron 6mg BID (currently on day #2) - COVID positive on 12/31/2020 
--GI/DVT prophylaxis  
--Critical condition but stable More than 50% of the time documented was spent in face-to-face contact with the patient and in the care of the patient on the floor/unit where the patient is located. Roby Scott, ILYA Lungs:  Coarse Heart:  RRR with no Murmur/Rubs/Gallops Additional Comments:  Continue current measure as above, he is DNR but agrees to BIPAP, unfortunately BIPAP does not appear  to prevent patients with COVID- 19 respiratory failure from worsening and eventually most of them need ventilator support I have spoken with and examined the patient. I agree with the above assessment and plan as documented.  
 
Jared Avila MD

## 2021-01-09 NOTE — PROGRESS NOTES
01/09/21 0032 Oxygen Therapy O2 Sat (%) 94 % Pulse via Oximetry 82 beats per minute O2 Device BIPAP  
FIO2 (%) 100 % Respiratory Respiratory (WDL) X  
CPAP/BIPAP  
CPAP/BIPAP Start/Stop On Device Mode BIPAP  
$$ Bipap Daily Yes Mask Type and Size Full face; Medium Skin Condition Intact PIP Observed 20 cm H20 IPAP (cm H2O) 18 cm H2O  
EPAP (cm H2O) 10 cm H2O Inspiratory Time (sec) 0.9 seconds Vt Spont (ml) 972 ml Ve Observed (l/min) 31.9 l/min Backup Rate 14 Total RR (Spontaneous) 33 breaths per minute Insp Rise Time (sec) 3 Leak (Estimated) 43 L/min  
Pt's Home Machine No  
Biomedical Check Performed Yes Settings Verified Yes Alarm Settings High Pressure 30 Low Pressure 5 Apnea 20 Low Ve 2 High Rate 50 Low Rate 8 Pulmonary Toilet Pulmonary Toilet H. O.B elevated Patient placed on QHS BiPAP. Resting comfortably at this time. RN notified.

## 2021-01-10 NOTE — PROGRESS NOTES
Brijesh Mullen Admission Date: 1/1/2021 Daily Progress Note: 1/10/2021 (Pulmonary Team Day #2 Eval) The patient's chart is reviewed and the patient is discussed with the staff. Patient is a 70 y.o.  male seen and evaluated at the request of Dr. Jolene Tran. He developed a recent fever with cough, fatigue, shortness of breath, fever and chills. He tested positive for COVID on 12/30. He was seen in the ER and given a script for antibiotics and discharged home. He represented on 1/1 with ongoing fever and overall, feeling worse. His CXR shows bilateral infiltrates. He has been started on Rocephin, Zithroma, Remdesivir, Vit D and Decadron. He was given plasma on 1/1. His oxygen needs fgradually increased and he was placed on airvo with 100% O2 - a nonrebreather was added on 1/8/2020 for additional oxygen needs. He was on Bipap on 1/7/2020. He is a DNR but agrees to Bipap for therapeutic managment. His D dimer is 1.6. Subjective: No significant improvements or escalation of symptoms. SPO2 hovering between 90-92% on 100%/60L AIRVO without any activity. Requiring every bit of 100%/60L to maintain saturations. Used BIPAP through the night. Two ensures yesterday but no food due to drop in oxygenation. Patient has no complaints today and is very pleasant. Current Facility-Administered Medications Medication Dose Route Frequency  insulin glargine (LANTUS) injection 40 Units  40 Units SubCUTAneous QHS  insulin lispro (HUMALOG) injection 15 Units  15 Units SubCUTAneous TIDAC  enoxaparin (LOVENOX) injection 30 mg  30 mg SubCUTAneous Q12H  
 dexAMETHasone (DECADRON) tablet 6 mg  6 mg Oral Q12H  
 insulin lispro (HUMALOG) injection   SubCUTAneous AC&HS  aspirin delayed-release tablet 81 mg  81 mg Oral QHS  atorvastatin (LIPITOR) tablet 40 mg  40 mg Oral QHS  lisinopriL (PRINIVIL, ZESTRIL) tablet 10 mg  10 mg Oral QHS  metoprolol tartrate (LOPRESSOR) tablet 25 mg  25 mg Oral QHS  sodium chloride (NS) flush 5-40 mL  5-40 mL IntraVENous Q8H  
 sodium chloride (NS) flush 5-40 mL  5-40 mL IntraVENous PRN  polyethylene glycol (MIRALAX) packet 17 g  17 g Oral DAILY PRN  promethazine (PHENERGAN) tablet 12.5 mg  12.5 mg Oral Q6H PRN Or  
 ondansetron (ZOFRAN) injection 4 mg  4 mg IntraVENous Q6H PRN  
 famotidine (PEPCID) tablet 20 mg  20 mg Oral BID  acetaminophen (TYLENOL) tablet 650 mg  650 mg Oral Q6H PRN Or  
 acetaminophen (TYLENOL) suppository 650 mg  650 mg Rectal Q6H PRN  
 guaiFENesin-dextromethorphan (ROBITUSSIN DM) 100-10 mg/5 mL syrup 5 mL  5 mL Oral Q4H PRN  
 0.9% sodium chloride infusion 250 mL  250 mL IntraVENous PRN  
 dextrose 40% (GLUTOSE) oral gel 1 Tube  15 g Oral PRN  
 glucagon (GLUCAGEN) injection 1 mg  1 mg IntraMUSCular PRN  
 dextrose (D50W) injection syrg 12.5-25 g  25-50 mL IntraVENous PRN  
 albuterol (PROVENTIL HFA, VENTOLIN HFA, PROAIR HFA) inhaler 2 Puff  2 Puff Inhalation Q6H PRN Review of Systems Constitutional: negative for fever, chills, sweats Cardiovascular: negative for chest pain, palpitations, syncope, edema Gastrointestinal:  negative for dysphagia, reflux, vomiting, diarrhea, abdominal pain, or melena Neurologic:  negative for focal weakness, numbness, headache Objective:  
 
Vitals:  
 01/09/21 2105 01/10/21 0045 01/10/21 0101 01/10/21 0320 BP: 124/79  125/70 128/73 Pulse: 72  80 75 Resp: 19  20 20 Temp: 97.7 °F (36.5 °C)  97.9 °F (36.6 °C) 97.6 °F (36.4 °C) SpO2: 93% 91% 90% 91% Weight:      
Height:      
 
 
 
Intake/Output Summary (Last 24 hours) at 1/10/2021 9635 Last data filed at 1/9/2021 1204 Gross per 24 hour Intake 240 ml Output  Net 240 ml Physical Exam:  
Constitution:  the patient is well developed and in no acute distress. Very pleasant.  
EENMT:  Sclera clear, pupils equal, oral mucosa moist 
 Respiratory: Inspiratory / expiratory crackles half way up the posterior lungs 
Cardiovascular:  RRR without M,G,R 
Gastrointestinal: soft and non-tender; with positive bowel sounds. 
Musculoskeletal: warm without cyanosis. There is no lower extremity edema. 
Skin:  no jaundice or rashes, none wounds  
Neurologic: no gross neuro deficits    
Psychiatric:  alert and oriented x 3 
 
CXR: Last CXR on 1/4/2021 
 
 
 
LAB 
Recent Labs  
  01/10/21 
0606 01/09/21 
2209 01/09/21 
1603 01/09/21 
1147 01/09/21 
0608  
GLUCPOC 224* 146* 226* 351* 346*  
  
Recent Labs  
  01/09/21 
0313 01/08/21 
0654  
INR 1.1 1.2  
 
No results for input(s): NA, K, CL, CO2, GLU, BUN, CREA, MG, CA, PHOS, TROIQ, ALB, TBIL, TBILI, ALT, BNPP in the last 72 hours. 
 
No lab exists for component: TROIP, GPT, SGOT 
Recent Labs  
  01/08/21 
0458 01/07/21 2006  
PHI 7.41 7.42  
PCO2I 31.7* 27.6*  
PO2I 78 64*  
HCO3I 20.2* 18.1*  
 
No results for input(s): LCAD, LAC in the last 72 hours. 
 
 
Assessment:  (Medical Decision Making)  
 
Hospital Problems  Date Reviewed: 4/27/2020  
       Codes Class Noted POA  
 Leukocytosis ICD-10-CM: D72.829 
ICD-9-CM: 288.60  1/4/2021 Unknown  
   
 Acute respiratory failure with hypoxia (HCC) ICD-10-CM: J96.01 
ICD-9-CM: 518.81  1/1/2021 Yes  
   
 * (Principal) Pneumonia due to COVID-19 virus ICD-10-CM: U07.1, J12.82 
ICD-9-CM: 480.8  1/1/2021 Yes  
   
 Mixed hyperlipidemia ICD-10-CM: E78.2 
ICD-9-CM: 272.2  1/1/2021 Yes  
   
 Gastroesophageal reflux disease ICD-10-CM: K21.9 
ICD-9-CM: 530.81  1/1/2021 Yes  
   
 Stage 3a chronic kidney disease ICD-10-CM: N18.31 
ICD-9-CM: 585.3  1/1/2021 Yes  
   
 Vitamin D deficiency ICD-10-CM: E55.9 
ICD-9-CM: 268.9  1/1/2021 Yes  
   
 Other chest pain ICD-10-CM: R07.89 
ICD-9-CM: 786.59  4/27/2020 Yes  
   
 Coronary artery disease involving native coronary artery of native heart with angina pectoris (HCC) ICD-10-CM: I25.119 
ICD-9-CM: 414.01, 413.9  4/21/2020 Yes  
 Overview Signed 1/1/2021 11:17 AM by Francoise Winn DO [facility] Cardiol. Abn ETT led to 3v CABG. 1990s? Type 2 diabetes mellitus (HCC) ICD-10-CM: E11.9 ICD-9-CM: 250.00  7/23/2018 Yes Essential hypertension ICD-10-CM: I10 
ICD-9-CM: 401.9  9/15/2010 Yes Overview Signed 1/1/2021 11:17 AM by Francoise Winn DO Last Assessment & Plan:  
Patient is stable on medications Plan:  (Medical Decision Making) --Bipap in room if needed. Utilized last night. --Continue Airvo 60L/100% --Encourage side to side and consider pronation 
--Increase ensure to qid given he isn't able to tolerate food consumption 
--Continue decadron 6mg BID (currently on day #3) - COVID positive on 12/31/2020 
--GI/DVT prophylaxis More than 50% of the time documented was spent in face-to-face contact with the patient and in the care of the patient on the floor/unit where the patient is located. Bo Pablo NP Lungs:  clear Heart:  RRR with no Murmur/Rubs/Gallops Additional Comments: on 100 % airvo ,Sats dropping, was on BIPAP at night, would put him again on BIPAP- he is DNR I have spoken with and examined the patient. I agree with the above assessment and plan as documented.  
 
Ari Brown MD

## 2021-01-10 NOTE — PROGRESS NOTES
Assumed care at [address] [place] [address]. A&Ox4. Resting in bed at this time. Airvo at [facility] at 100%. No s/sx of distress at this time. Droplet plus isolation due to positive COVID 19 test result and proper PPE worn. Call light within reach and is encouraged to call for assistance if needed. Will continue to monitor.

## 2021-01-10 NOTE — PROGRESS NOTES
Hospitalist Progress Note 1/10/2021 Admit Date: 2021  9:55 AM  
NAME: Juana Silvestre :  1945 MRN:  [id number] Attending: Taurus Yañez MD 
PCP:  Lorenza Miller MD 
 
SUBJECTIVE:  
Patient is a 70year old CM w/ a PMHx of CAD s/p CABG, DMII presented with SOB. Pt stated that he started having SOB associated with fatigue, myalgias, cough, fever and chills about 5 days ago. He went to the ED 2 days later and was tested for Posive for Covid (). He was sent home on antibiotics at that time as he did not require supplemental oxygen at rest and on exertion. Pt stated in the past several days symptoms have gotten worse. He had a T 102 PTA and checked his O2 sat which dropped down to the 79%, prompting him to call EMS. In the ED, pt was placed on 2L with O2sat>90%. CBC and CMP at baseline. Procal negative. CXR shows bibasilar atelectasis or pneumonia worse in the interval. Pt was given dexamethasone, azithromycin and Ceftriaxone in ED. 
 
1/10 Did better on bipap overnight. Back on airvo and satting low-mid 90s at rest. Desats to 85 after a few minutes conversation. No fevers. pulm following. Review of Systems negative with exception of pertinent positives noted above PHYSICAL EXAM  
 
Visit Vitals /74 (BP 1 Location: Right arm, BP Patient Position: At rest) Pulse 86 Temp 97.7 °F (36.5 °C) Resp 18 Ht 5' 10\" (1.778 m) Wt 73.9 kg (163 lb) SpO2 93% BMI 23.39 kg/m² Temp (24hrs), Av.8 °F (36.6 °C), Min:97.6 °F (36.4 °C), Max:97.9 °F (36.6 °C) Oxygen Therapy O2 Sat (%): 93 % (01/10/21 0804) Pulse via Oximetry: 81 beats per minute (01/10/21 0801) O2 Device: BIPAP (01/10/21 0801) O2 Flow Rate (L/min): 60 l/min (21) O2 Temperature: 87.8 °F (31 °C) (21) FIO2 (%): 100 % (01/10/21 0801) Intake/Output Summary (Last 24 hours) at 1/10/2021 1157 Last data filed at 2021 1204 Gross per 24 hour Intake 0 ml Output   
 Net 0 ml General: No acute distress Lungs:  Coarse bases bilaterally. Heart:  Regular rate and rhythm,  No murmur, rub, or gallop Abdomen: Soft, Non distended, Non tender, Positive bowel sounds Extremities: No cyanosis, clubbing or edema Neurologic:  No focal deficits XR CHEST SNGL V Final Result Impression: Improving bilateral parenchymal opacities. XR CHEST PORT Final Result IMPRESSION: Bibasilar atelectasis or pneumonia worse in the interval.  
  
 
 
 
ASSESSMENT Active Hospital Problems Diagnosis Date Noted  Leukocytosis 01/04/2021  Acute respiratory failure with hypoxia ([facility] [address] 01/01/2021  Pneumonia due to COVID-19 virus 01/01/2021  Mixed hyperlipidemia 01/01/2021  Gastroesophageal reflux disease 01/01/2021  Stage 3a chronic kidney disease 01/01/2021  Vitamin D deficiency 01/01/2021  Other chest pain 04/27/2020  Coronary artery disease involving native coronary artery of native heart with angina pectoris ([facility] [address] 04/21/2020 [facility] Cardiol. [facility] ETT led to 3v CABG. 1990s?  Type 2 diabetes mellitus ([facility] [address] 07/23/2018  Essential hypertension 09/15/2010 Last Assessment & Plan:  
Patient is stable on medications Plan: 
 
Acute respiratory failure with hypoxia 2/2 Covid-19 Pneumonia O2 sat at home 79% on RA and placed on 2L NC in ED. COVID POSITIVE on 12/30. CXR shows b/l atelectasis vs pneumonia. S/p plasma Dexamethasone S/p Remdesivir Proning as tolerated SSI while on steroids. Added basal and premeal. 
Oxygen supplementation--requiring every bit of 100% FiO2 right now. pulm following. On and off bipap, not interested in intubation. D dimer flat, mild elevation. Leukocytosis: most likely 2/2 steroids. CAD s/p CABG: Cont home ASA, statin, lisinopril metoprolol T2DM: hold home Actos. On SSI w/ POC BG qAC/HS. Added lantus. HTN: Cont home Lisinopril and metoprolol GERD: Pepcid BID 
 Stage 3 CKD: Cr at baseline HLD: Cont home statin Vit D Def: Cont home supplements 
  
DVT PPx: Lovenox SQ Code Status: DNR 
  
 
Signed By: Yesenia Mc MD   
 January 10, 2021

## 2021-01-10 NOTE — PROGRESS NOTES
01/10/21 0045 Oxygen Therapy O2 Sat (%) 91 % O2 Device BIPAP  
FIO2 (%) 100 % Respiratory Respiratory (WDL) X  
CPAP/BIPAP  
CPAP/BIPAP Start/Stop On Device Mode BIPAP  
$$ Bipap Daily Yes Mask Type and Size Full face; Medium Skin Condition Intact PIP Observed 19 cm H20 IPAP (cm H2O) 18 cm H2O  
EPAP (cm H2O) 12 cm H2O Inspiratory Time (sec) 1 seconds Vt Spont (ml) 822 ml Ve Observed (l/min) 21.3 l/min Backup Rate 14 Total RR (Spontaneous) 26 breaths per minute Insp Rise Time (sec) 3 Leak (Estimated) 87 L/min  
Pt's Home Machine No  
Biomedical Check Performed Yes Settings Verified Yes Alarm Settings High Pressure 30 Low Pressure 5 Apnea 20 Low Ve 2 High Rate 50 Low Rate 8 Pulmonary Toilet Pulmonary Toilet H. O.B elevated Patient placed on hospitals BiPAP. Resting comfortably at this time.

## 2021-01-11 NOTE — PROGRESS NOTES
Alexis Mendez Admission Date: 1/1/2021 Daily Progress Note: 1/11/2021 The patient's chart is reviewed and the patient is discussed with the staff. Patient is a 70 y.o.  male seen and evaluated at the request of Dr. Karoline Campbell. He developed a recent fever with cough, fatigue, shortness of breath, fever and chills. He tested positive for COVID on 12/30. He was seen in the ER and given a script for antibiotics and discharged home. He represented on 1/1 with ongoing fever and overall, feeling worse. His CXR shows bilateral infiltrates. He has been started on Rocephin, Zithroma, Remdesivir, Vit D and Decadron. He was given plasma on 1/1. His oxygen needs fgradually increased and he was placed on airvo with 100% O2 - a nonrebreather was added on 1/8/2020 for additional oxygen needs. He was on Bipap on 1/7/2020. He is a DNR but agrees to Bipap for therapeutic managment. His D dimer is 1.6. Subjective:  
Unable to get off of BIPAP this AM on 100%, but tolerating it fairly well. Hypoxic when tried airvo 100%. No other events. Assisted with something to drink, desatted to 70s within a few minutes. BIPAP replaced and rebounded fairly well. Only complaint is for staff to assist with something to drink every few hours. Current Facility-Administered Medications Medication Dose Route Frequency  insulin glargine (LANTUS) injection 40 Units  40 Units SubCUTAneous QHS  [Held by provider] insulin lispro (HUMALOG) injection 15 Units  15 Units SubCUTAneous TIDAC  enoxaparin (LOVENOX) injection 30 mg  30 mg SubCUTAneous Q12H  
 dexAMETHasone (DECADRON) tablet 6 mg  6 mg Oral Q12H  
 insulin lispro (HUMALOG) injection   SubCUTAneous AC&HS  aspirin delayed-release tablet 81 mg  81 mg Oral QHS  atorvastatin (LIPITOR) tablet 40 mg  40 mg Oral QHS  lisinopriL (PRINIVIL, ZESTRIL) tablet 10 mg  10 mg Oral QHS  metoprolol tartrate (LOPRESSOR) tablet 25 mg  25 mg Oral QHS  sodium chloride (NS) flush 5-40 mL  5-40 mL IntraVENous Q8H  
 sodium chloride (NS) flush 5-40 mL  5-40 mL IntraVENous PRN  polyethylene glycol (MIRALAX) packet 17 g  17 g Oral DAILY PRN  promethazine (PHENERGAN) tablet 12.5 mg  12.5 mg Oral Q6H PRN Or  
 ondansetron (ZOFRAN) injection 4 mg  4 mg IntraVENous Q6H PRN  
 famotidine (PEPCID) tablet 20 mg  20 mg Oral BID  acetaminophen (TYLENOL) tablet 650 mg  650 mg Oral Q6H PRN Or  
 acetaminophen (TYLENOL) suppository 650 mg  650 mg Rectal Q6H PRN  
 guaiFENesin-dextromethorphan (ROBITUSSIN DM) 100-10 mg/5 mL syrup 5 mL  5 mL Oral Q4H PRN  
 0.9% sodium chloride infusion 250 mL  250 mL IntraVENous PRN  
 dextrose 40% (GLUTOSE) oral gel 1 Tube  15 g Oral PRN  
 glucagon (GLUCAGEN) injection 1 mg  1 mg IntraMUSCular PRN  
 dextrose (D50W) injection syrg 12.5-25 g  25-50 mL IntraVENous PRN  
 albuterol (PROVENTIL HFA, VENTOLIN HFA, PROAIR HFA) inhaler 2 Puff  2 Puff Inhalation Q6H PRN Review of Systems Constitutional: negative for fever, chills, sweats Cardiovascular: negative for chest pain, palpitations, syncope, edema Gastrointestinal:  negative for dysphagia, reflux, vomiting, diarrhea, abdominal pain, or melena Neurologic:  negative for focal weakness, numbness, headache Objective:  
 
Vitals:  
 01/10/21 2317 01/11/21 5168 01/11/21 5116 01/11/21 0279 BP:  133/80 Pulse:  78 Resp:  20 Temp:  97.5 °F (36.4 °C) SpO2: 94% (!) 89% 90% 94% Weight:      
Height:      
 
 
 
Intake/Output Summary (Last 24 hours) at 1/11/2021 1135 Last data filed at 1/11/2021 6366 Gross per 24 hour Intake 0 ml Output 750 ml Net -750 ml Physical Exam:  
Constitution:  the patient is well developed and in no acute distress. Very pleasant.  
EENMT:  Sclera clear, pupils equal, oral mucosa moist 
 Respiratory: Inspiratory / expiratory crackles half way up the posterior lungs Cardiovascular:  RRR without M,G,R 
Gastrointestinal: soft and non-tender; with positive bowel sounds. Musculoskeletal: warm without cyanosis. There is no lower extremity edema. Skin:  no jaundice or rashes, none wounds Neurologic: no gross neuro deficits Psychiatric:  alert and oriented x 3 CXR:  
1/11/21--pending today 1/4/21 LAB Recent Labs  
  01/11/21 
0520 01/10/21 
2037 01/10/21 
1612 01/10/21 
1119 01/10/21 
0606 GLUCPOC 139* 219* 277* 274* 224* Recent Labs  
  01/10/21 
1608 01/09/21 
0975 WBC 20.4*  --   
HGB 14.3  --   
HCT 42.2  --   
  --   
INR 1.0 1.1 Recent Labs  
  01/10/21 
1608 * K 4.5  
 CO2 21 * BUN 64* CREA 1.56* CA 9.6 No results for input(s): PH, PCO2, PO2, HCO3, PHI, PCO2I, PO2I, HCO3I in the last 72 hours. No results for input(s): LCAD, LAC in the last 72 hours. Assessment:  (Medical Decision Making) Hospital Problems  Date Reviewed: 4/27/2020 Codes Class Noted POA Leukocytosis ICD-10-CM: X50.649 ICD-9-CM: 288.60  1/4/2021 Unknown [facility] ICD-10-CM: J96.01 
ICD-9-CM: 518.81  1/1/2021 Yes * (Principal) Pneumonia due to COVID-19 virus ICD-10-CM: U07.1, J12.82 ICD-9-CM: 480.8  1/1/2021 Yes Mixed hyperlipidemia ICD-10-CM: E78.2 ICD-9-CM: 272.2  1/1/2021 Yes Gastroesophageal reflux disease ICD-10-CM: K21.9 ICD-9-CM: 530.81  1/1/2021 Yes Stage 3a chronic kidney disease ICD-10-CM: N18.31 
ICD-9-CM: 585.3  1/1/2021 Yes Vitamin D deficiency ICD-10-CM: E55.9 ICD-9-CM: 268.9  1/1/2021 Yes Other chest pain ICD-10-CM: R07.89 ICD-9-CM: 786.59  4/27/2020 Yes Coronary artery disease involving native coronary artery of native heart with angina pectoris ([facility] ICD-10-CM: I25.119 ICD-9-CM: 414.01, 413.9  4/21/2020 Yes Overview Signed 1/1/2021 11:17 AM by Татьяна Branch DO [facility]. Abn ETT led to 3v CABG. 1990s? Type 2 diabetes mellitus (HCC) ICD-10-CM: E11.9 ICD-9-CM: 250.00  7/23/2018 Yes Essential hypertension ICD-10-CM: I10 
ICD-9-CM: 401.9  9/15/2010 Yes Overview Signed 1/1/2021 11:17 AM by Татьяна Branch DO Last Assessment & Plan:  
Patient is stable on medications Plan:  (Medical Decision Making) --cont BIPAP he is on 100% and he is a DNI 
--review repeat CXR once completed 
--Encourage side to side and consider pronation 
--s/p remdesivir, s/p plasma 
--Continue decadron 6mg BID (currently on day #4) - COVID positive on 12/31/2020 
--GI/DVT prophylaxis More than 50% of the time documented was spent in face-to-face contact with the patient and in the care of the patient on the floor/unit where the patient is located. Sj Smith NP I have spoken with and examined the patient. I agree with the above assessment and plan as documented. Gen:  Pleasant Lungs:  Crackles posteriorly Heart:  RRR with no Murmur/Rubs/Gallops Abd: NTND Ext: no edema 
 
--trying proning now, on CPAP [id number] 
--otherwise continue above measures.  
 
Igor Gallego MD

## 2021-01-11 NOTE — PROGRESS NOTES
PT Daily Note: 
HOLD PT per RN due to respiratory status. Will check back on patient at a later date/time if schedule permits.  
Thank you, 
Jessica Nathan, PTA

## 2021-01-11 NOTE — PROGRESS NOTES
01/10/21 2317 Oxygen Therapy O2 Sat (%) 94 % Pulse via Oximetry 76 beats per minute O2 Device BIPAP  
FIO2 (%) 100 % Respiratory Respiratory (WDL) X  
CPAP/BIPAP  
CPAP/BIPAP Start/Stop On Device Mode BIPAP  
$$ Bipap Daily Yes Mask Type and Size Full face; Medium Skin Condition Intact PIP Observed 19 cm H20 IPAP (cm H2O) 18 cm H2O  
EPAP (cm H2O) 12 cm H2O Inspiratory Time (sec) 1 seconds Vt Spont (ml) 778 ml Ve Observed (l/min) 21.2 l/min Backup Rate 14 Total RR (Spontaneous) 27 breaths per minute Insp Rise Time (sec) 3 Leak (Estimated) 51 L/min  
Pt's Home Machine No  
Biomedical Check Performed Yes Settings Verified Yes Alarm Settings High Pressure 30 Low Pressure 5 Apnea 20 Low Ve 2 High Rate 50 Low Rate 8 Pulmonary Toilet Pulmonary Toilet H. O.B elevated Patient placed on QHS BiPAP. Resting comfortably at this time. RN notified.

## 2021-01-11 NOTE — PROGRESS NOTES
Hospitalist Progress Note 2021 Admit Date: 2021  9:55 AM  
NAME: Colby Cook :  1945 MRN:  [id number] Attending: Radha Mooney MD 
PCP:  Ray Andrea MD 
 
SUBJECTIVE:  
Patient is a 70year old CM w/ a PMHx of CAD s/p CABG, DMII presented with SOB. Pt stated that he started having SOB associated with fatigue, myalgias, cough, fever and chills about 5 days ago. He went to the ED 2 days later and was tested for Posive for Covid (). He was sent home on antibiotics at that time as he did not require supplemental oxygen at rest and on exertion. Pt stated in the past several days symptoms have gotten worse. He had a T 102 PTA and checked his O2 sat which dropped down to the 79%, prompting him to call EMS. In the ED, pt was placed on 2L with O2sat>90%. CBC and CMP at baseline. Procal negative. CXR shows bibasilar atelectasis or pneumonia worse in the interval. Pt was given dexamethasone, azithromycin and Ceftriaxone in ED. 
 
 Unable to get off bipap this morning, but tolerating it fairly well. Hypoxic when tried on airvo 100% No other events Review of Systems negative with exception of pertinent positives noted above PHYSICAL EXAM  
 
Visit Vitals /80 Pulse 78 Temp 97.5 °F (36.4 °C) Resp 20 Ht 5' 10\" (1.778 m) Wt 73.9 kg (163 lb) SpO2 94% BMI 23.39 kg/m² Temp (24hrs), Av.7 °F (36.5 °C), Min:97.5 °F (36.4 °C), Max:97.9 °F (36.6 °C) Oxygen Therapy O2 Sat (%): 94 % (21 09) Pulse via Oximetry: 84 beats per minute (21 0852) O2 Device: BIPAP (21) O2 Flow Rate (L/min): 60 l/min (01/10/21 2240) O2 Temperature: 87.8 °F (31 °C) (21) FIO2 (%): 100 % (21 0852) Intake/Output Summary (Last 24 hours) at 2021 1004 Last data filed at 2021 8577 Gross per 24 hour Intake 0 ml Output 750 ml Net -750 ml General: Comfortable on bipap Lungs:  Coarse, diminished L base Heart:  Regular rate and rhythm,  No murmur, rub, or gallop Abdomen: Soft, Non distended, Non tender, Positive bowel sounds Extremities: No cyanosis, clubbing or edema Neurologic:  No focal deficits XR CHEST SNGL V Final Result Impression: Improving bilateral parenchymal opacities. XR CHEST PORT Final Result IMPRESSION: Bibasilar atelectasis or pneumonia worse in the interval.  
  
 
 
 
ASSESSMENT Active Hospital Problems Diagnosis Date Noted  Leukocytosis 01/04/2021  Acute respiratory failure with hypoxia ([facility] 01/01/2021  Pneumonia due to COVID-19 virus 01/01/2021  Mixed hyperlipidemia 01/01/2021  Gastroesophageal reflux disease 01/01/2021  Stage 3a chronic kidney disease 01/01/2021  Vitamin D deficiency 01/01/2021  Other chest pain 04/27/2020  Coronary artery disease involving native coronary artery of native heart with angina pectoris ([facility] 04/21/2020 [facility]. Abn ETT led to 3v CABG. 1990s?  Type 2 diabetes mellitus ([facility] 07/23/2018  Essential hypertension 09/15/2010 Last Assessment & Plan:  
Patient is stable on medications Plan: 
 
Acute respiratory failure with hypoxia 2/2 Covid-19 Pneumonia O2 sat at home 79% on RA and placed on 2L NC in ED. COVID POSITIVE on 12/30. CXR shows b/l atelectasis vs pneumonia. S/p plasma Dexamethasone S/p Remdesivir Proning as tolerated SSI while on steroids. Added basal and premeal. 
Oxygen supplementation--requiring 100% FiO2 for several days 
pulm following for NIV, not interested in intubation. D dimer flat, mild elevation. Now unable to come off bipap today after a relatively better day yesterday. Not uncomfortable. Has diminished sounds L base, so will check XR for effusion. CAD s/p CABG: Cont home ASA, statin, lisinopril metoprolol T2DM: hold home Actos. On SSI w/ POC BG qAC/HS. Added lantus. HTN: Cont home Lisinopril and metoprolol GERD: Pepcid BID 
 Stage 3 CKD: Cr at baseline HLD: Cont home statin Vit D Def: Cont home supplements 
  
DVT PPx: Lovenox SQ Code Status: DNR 
  
 
Signed By: Rukhsana Chapa MD   
 January 11, 2021

## 2021-01-11 NOTE — PROGRESS NOTES
Pt resting in bed with eyes closed. Awakens when spoken to. Alert and oriented tiems 3 at this time. Bipap in place. No s/sx of distress noted at this time. Denies any pain. Encouraged to call for assistance as needed. Call light within reach. Will continue to monitor.

## 2021-01-11 NOTE — PROGRESS NOTES
Pt was on Bipap during the night with o2 sat between 89-90%. RT attempted to remove bipap and put Airvo on 60L at 100%. Had to immediately put NRB mask on top of Airvo and sat was only in low 80s. RT put pt back on bipap and is currently still on it with sat at 90%. Dr Mir Devries notified via perfect serve.

## 2021-01-11 NOTE — PROGRESS NOTES
Pt sitting up in bed awake. Cpap mask in place. Continuous sat monitor in place reading between mid to low 90%. Encouraged to call for assistance as needed. Call light within reach. Will continue to monitor.

## 2021-01-11 NOTE — PROGRESS NOTES
Hold OT today d/t medical/ respiratory status. Will follow up as schedule/ pt's status allows.  
 
Hedy Montano OT

## 2021-01-11 NOTE — PROGRESS NOTES
Patient sleeping in bed on BIPAP  There are no signs of distress at this time  BSR given to oncoming RN

## 2021-01-11 NOTE — DIABETES MGMT
Patient admitted with pneumonia due to COVID-19 virus. Blood glucose ranged 219-281 yesterday with patient receiving Lantus 40 units, Humalog 65 units, and Decadron 12mg. Blood glucose this morning was 139. Reviewed patient current regimen: Lantus 40 units nightly, Humalog 15 units with meals, Humalog SSI, and Decadron 6mg q12h. Noted per chart review patient on bipap. If patient is unable to eat due to respiratory status would recommend holding prandial insulin to reduce patient risk of hypoglycemia.

## 2021-01-12 NOTE — PROCEDURES
Procedure: Endotracheal intubation Indication: Acute respiratory failure 518.81 Medications: 
Fentanyl 100mcg Etomidate 40mg Rocuronium 74mg After assessing the airway, the patient underwent preoxygenation with 100% FiO2 for 5 min. Fentanyl was then given and after 3 min, etomidate and succinylcholine were given sequentially in rapid IV push. The Sellick maneuver was performed throughout the entire sequence. After adequate sedation and paralysis intubation was performed. A Glidescope 3.0 laryngoscope was used to visualize the epiglottis and vocal cords. After positive identification of epiglottis and the vocal cords, a size 8.0  ET tube was placed into the trachea with direct visualization. Confirmation of endotracheal positionin. The ET tube was directly visualized passing through the vocal cords. 2.  CO2 colorimetry was employed immediately to verify tube in airway with appropriate color change indicating detection/lack of CO2.  
3.  Water vapor was seen within the ET tube, and auscultation of the abdomen revealed no bubbling sounds. 4.  Auscultation  And inspection of the chest after intubation showed symmetric chest excursion and symmetric air entry bilaterally. 5.  Chest X-ray has been ordered and is pending. The tube was secured at 23cm at the teeth with a tube richardson. The patient has been placed on a mechanical ventilator. There were no complications.  
 
Frantz Adams MD

## 2021-01-12 NOTE — PROCEDURES
Procedure: CENTRAL LINE PLACEMENT Indication: Hypotension Summary: 
Informed consent was obtained. A time-out was performed. Using landmarks, the RIGHT SUBCLAVIAN vein was identified and  was cannulated. The CVC kit guide wire was then inserted a. Using Seldinger technique, a 4 lumen catheter was then placed in the RIGHT SUBCLAVIAN vein, after dilation of entry port without difficulty. There was no significant bleeding during the procedure and good flush and drawback was noted. The CVC was then affixed with supplied 2.0 silk sutures via the proximal and distal limiters, with the insertion point affixed at 18 cm. A biostatic disc was applied to the entry port followed by a transparent dressing. A chest x-ray is ordered to confirm proper placement. There were no immediate complications.  
 
Frantz Adasm MD

## 2021-01-12 NOTE — PROGRESS NOTES
Current oxygen saturation 94 % on CPAP 100%. Respirations less labored. Patient still alert, oriented, and mental status appropriated. Patient denies any needs at this time. MD updated.

## 2021-01-12 NOTE — PROGRESS NOTES
Late entry: BSR received  Patient resting in bed on CPAP O2 sat 85-90% RR  38  Patient denies needs/pain at this time

## 2021-01-12 NOTE — PROGRESS NOTES
Patient resting in bed patient denies needs/pain at this time  Will give BSR to oncoming RN upon arrival

## 2021-01-12 NOTE — PROGRESS NOTES
PT Discharge Note: 
Patient is being discharged from PT caseload at this time due to a decline in medical status and subsequent transfer to the ICU. Please re-order PT if patient would benefit from our services in the future.  
Thank you, 
Hamzah Aguillon, PTA

## 2021-01-12 NOTE — PROCEDURES
Ultrasound guided Arterial Line Placement Start time: 1/12/2021 1:59 PM 
End time: 1/12/2021 2:07 PM 
Performed by: Sabina Saunders MD 
Authorized by: Sabina Saunders MD  
 
Pre-Procedure Indications:  Arterial pressure monitoring Preanesthetic Checklist: patient identified, risks and benefits discussed, anesthesia consent, site marked, patient being monitored, timeout performed and patient being monitored Timeout Time: 13:59 Procedure:  
Prep:  Chlorhexidine Seldinger Technique?: Yes Orientation:  Right Location:  Radial artery Catheter size:  20 G Number of attempts:  1 Assessment:  
Post-procedure:  Line secured and sterile dressing applied Patient Tolerance:  Patient tolerated the procedure well with no immediate complications Comment:  
Potential access site(s) were examined with ultrasound and acceptable patent access site selected as noted above. Needle path and artery access visualized in real time using ultrasound, an image of wire in vessel recorded for permanent record.

## 2021-01-12 NOTE — PROGRESS NOTES
Updated primary attending MD concerning patient condition. Patient currently on CPAP mask at 100 % with oxygen saturation 86%. Still alert, oriented, and mental status appropriate, but he remains on continuous CPAP. He was changed to full code. Last ABG results given

## 2021-01-12 NOTE — PROGRESS NOTES
Francy Manzano Admission Date: 1/1/2021 Daily Progress Note: 1/12/2021 The patient's chart is reviewed and the patient is discussed with the staff. Patient is a 70 y.o.  male seen and evaluated at the request of Dr. Tono Wallace. He developed a recent fever with cough, fatigue, shortness of breath, fever and chills. He tested positive for COVID on 12/30. He was seen in the ER and given a script for antibiotics and discharged home. He represented on 1/1 with ongoing fever and overall, feeling worse. His CXR shows bilateral infiltrates. He has been started on Rocephin, Zithroma, Remdesivir, Vit D and Decadron. He was given plasma on 1/1. His oxygen needs fgradually increased and he was placed on airvo with 100% O2 - a nonrebreather was added on 1/8/2020 for additional oxygen needs. He was on Bipap on 1/7/2020. He is a DNR but agrees to Bipap for therapeutic managment. His D dimer is 1.6. Subjective: He has been unable to come off of CPAP 16 100% at all today. O2 sats 84% upon entering room and desats to 72% with repositioning. Only came up to 88% after much rest and pursed lip breathing. Discussed status with pt and current status. Current Facility-Administered Medications Medication Dose Route Frequency  insulin glargine (LANTUS) injection 40 Units  40 Units SubCUTAneous QHS  [Held by provider] insulin lispro (HUMALOG) injection 15 Units  15 Units SubCUTAneous TIDAC  enoxaparin (LOVENOX) injection 30 mg  30 mg SubCUTAneous Q12H  
 dexAMETHasone (DECADRON) tablet 6 mg  6 mg Oral Q12H  
 insulin lispro (HUMALOG) injection   SubCUTAneous AC&HS  aspirin delayed-release tablet 81 mg  81 mg Oral QHS  atorvastatin (LIPITOR) tablet 40 mg  40 mg Oral QHS  lisinopriL (PRINIVIL, ZESTRIL) tablet 10 mg  10 mg Oral QHS  metoprolol tartrate (LOPRESSOR) tablet 25 mg  25 mg Oral QHS  sodium chloride (NS) flush 5-40 mL  5-40 mL IntraVENous Q8H  
  sodium chloride (NS) flush 5-40 mL  5-40 mL IntraVENous PRN  polyethylene glycol (MIRALAX) packet 17 g  17 g Oral DAILY PRN  promethazine (PHENERGAN) tablet 12.5 mg  12.5 mg Oral Q6H PRN Or  
 ondansetron (ZOFRAN) injection 4 mg  4 mg IntraVENous Q6H PRN  
 famotidine (PEPCID) tablet 20 mg  20 mg Oral BID  acetaminophen (TYLENOL) tablet 650 mg  650 mg Oral Q6H PRN Or  
 acetaminophen (TYLENOL) suppository 650 mg  650 mg Rectal Q6H PRN  
 guaiFENesin-dextromethorphan (ROBITUSSIN DM) 100-10 mg/5 mL syrup 5 mL  5 mL Oral Q4H PRN  
 0.9% sodium chloride infusion 250 mL  250 mL IntraVENous PRN  
 dextrose 40% (GLUTOSE) oral gel 1 Tube  15 g Oral PRN  
 glucagon (GLUCAGEN) injection 1 mg  1 mg IntraMUSCular PRN  
 dextrose (D50W) injection syrg 12.5-25 g  25-50 mL IntraVENous PRN  
 albuterol (PROVENTIL HFA, VENTOLIN HFA, PROAIR HFA) inhaler 2 Puff  2 Puff Inhalation Q6H PRN Review of Systems Constitutional: negative for fever, chills, sweats Cardiovascular: negative for chest pain, palpitations, syncope, edema Gastrointestinal:  negative for dysphagia, reflux, vomiting, diarrhea, abdominal pain, or melena Neurologic:  negative for focal weakness, numbness, headache Objective:  
 
Vitals:  
 01/11/21 2235 01/11/21 2314 01/12/21 9929 01/12/21 2815 BP: (!) 173/97 131/76  132/86 Pulse: (!) 105 (!) 108  93 Resp:  24  26 Temp:  97.4 °F (36.3 °C)  97.4 °F (36.3 °C) SpO2:  90% 93% (!) 86% Weight:      
Height:      
 
 
 
Intake/Output Summary (Last 24 hours) at 1/12/2021 1000 Last data filed at 1/12/2021 2918 Gross per 24 hour Intake 0 ml Output 1000 ml Net -1000 ml Physical Exam:  
Constitution:  Appears uncomfortable, slightly anxious EENMT:  Sclera clear, pupils equal, oral mucosa moist 
Respiratory: crackles Cardiovascular:  RRR without M,G,R 
Gastrointestinal: soft and non-tender; with positive bowel sounds. Musculoskeletal: warm without cyanosis. There is no lower extremity edema. Skin:  no jaundice or rashes, none wounds Neurologic: no gross neuro deficits Psychiatric:  alert and oriented x 3, slightly anxious today CXR:  
1/11/21 LAB Recent Labs  
  01/12/21 
0528 01/11/21 
2137 01/11/21 
1635 01/11/21 
1215 01/11/21 
0520 GLUCPOC 210* 189* 146* 84 139* Recent Labs  
  01/12/21 
0744 01/11/21 
1036 01/10/21 
1608 WBC 23.6*  --  20.4* HGB 15.5  --  14.3 HCT 46.2  --  42.2 *  --  442 INR  --  1.0 1.0 Recent Labs  
  01/12/21 
0744 01/10/21 
1608 * 134* K 5.4* 4.5  
 101 CO2 27 21 * 281* BUN 60* 64* CREA 1.41 1.56* CA 10.1 9.6 ALB 2.7*  --   
TBILI 0.9  --   
ALT 47  --   
 
Recent Labs  
  01/12/21 
4040 PHI 7.47* PCO2I 32.6*  
PO2I 63* HCO3I 24.0 No results for input(s): LCAD, LAC in the last 72 hours. Assessment:  (Medical Decision Making) Hospital Problems  Date Reviewed: 4/27/2020 Codes Class Noted POA Leukocytosis ICD-10-CM: V95.297 ICD-9-CM: 288.60  1/4/2021 Unknown Acute respiratory failure with hypoxia ([facility] [address] ICD-10-CM: J96.01 
ICD-9-CM: 518.81  1/1/2021 Yes * (Principal) Pneumonia due to COVID-19 virus ICD-10-CM: U07.1, J12.82 ICD-9-CM: 480.8  1/1/2021 Yes Mixed hyperlipidemia ICD-10-CM: E78.2 ICD-9-CM: 272.2  1/1/2021 Yes Gastroesophageal reflux disease ICD-10-CM: K21.9 ICD-9-CM: 530.81  1/1/2021 Yes Stage 3a chronic kidney disease ICD-10-CM: N18.31 
ICD-9-CM: 585.3  1/1/2021 Yes Vitamin D deficiency ICD-10-CM: E55.9 ICD-9-CM: 268.9  1/1/2021 Yes Other chest pain ICD-10-CM: R07.89 ICD-9-CM: 786.59  4/27/2020 Yes Coronary artery disease involving native coronary artery of native heart with angina pectoris [facility] ICD-10-CM: I25.119 ICD-9-CM: 414.01, 413.9  4/21/2020 Yes  Overview Signed 1/1/2021 11:17 AM by Luigi Fitch DO  
 [facility]. Abn ETT led to 3v CABG. 1990s? Type 2 diabetes mellitus (HCC) ICD-10-CM: E11.9 ICD-9-CM: 250.00  7/23/2018 Yes Essential hypertension ICD-10-CM: I10 
ICD-9-CM: 401.9  9/15/2010 Yes Overview Signed 1/1/2021 11:17 AM by Darian Daniel DO Last Assessment & Plan:  
Patient is stable on medications Plan:  (Medical Decision Making) --transfer to ICU for BIPAP, he is now a FULL CODE HE IS  [address] 
--has remained on CPAP 16 cmm 100% with O2 sats in mid 80s without activity 
--s/p remdesivir, s/p plasma 
--Continue decadron 6mg  (currently on day #5) - COVID positive on 12/31/2020 
--GI/DVT prophylaxis --start precedex infusion for respiratory status upon transfer to ICU More than 50% of the time documented was spent in face-to-face contact with the patient and in the care of the patient on the floor/unit where the patient is located. Maryam Kingsley NP Addendum: After spoke with pt, he discussed with RN that he doesn't want to be intubated. RN informed me of change. Pt was previously a DNI and revoked it last PM back to FULL code. Obtained sonPaco's number and called and discussed pt status. Son states that he has spoken to father since RN has left the room and that he is ok with intubation. Re-entered the pt room and discussed options given current status. After his discussion with family, he is in agreement with intubation. He asked that sonCuba, be decision maker for his care, as [facility]. SonC[place] can be reached at [phone]. Dr Nakul Costa notified. Pt is currently stable to await pending transfer to CCU bed until intubation. Discussed with RN, RT, ICU RN, and Dr Nakul Costa. All agree. Primary RN to call rapid response if any changes occur prior to availability of bed. Currently O2 sat 90% on CPAP 16 100%. I have spoken with and examined the patient. I agree with the above assessment and plan as documented. Gen: pleasant, tachypneic on BiPAP, O2 sat 90% Lungs:  Crackles posteriorly Heart:  RRR with no Murmur/Rubs/Gallops Abd: NTND Ext: no edema 
 
--plan to move to ICU for intubation, CVC placement. --family updated. --DVT pppx, GI ppx ongoing Winston Ramirez MD

## 2021-01-12 NOTE — PROGRESS NOTES
patient stated that there is confusion as to his wishes for category of code  stated that he wants to be intubated if necessary also stated that he wants everything done to save his life  patient has been on CPAP @16 with RR's into high 40's at times for over 24hrs  MD notified and made aware

## 2021-01-12 NOTE — PROGRESS NOTES
TRANSFER - OUT REPORT: 
 
Verbal report given to Torie Boyce RN on Colby Cook  being transferred to ICU for routine progression of care Report consisted of patients Situation, Background, Assessment and  
Recommendations(SBAR). Information from the following report(s) SBAR was reviewed with the receiving nurse. Opportunity for questions and clarification was provided.    
 
Patient transported with: 
RN, Tech, RT

## 2021-01-12 NOTE — PROGRESS NOTES
Updated MD concerning patient condition. Patient currently on CPAP mask at 100 % with oxygen saturation 86%. Still alert, oriented, and mental status appropriate, but he remains on continuous CPAP. He was changed to full code. Last ABG results given.

## 2021-01-12 NOTE — PROGRESS NOTES
TRANSFER - IN REPORT: 
 
Verbal report received from [facility], RN(name) on Marifer Manzo  To be received from 837(unit) for urgent transfer Report consisted of patients Situation, Background, Assessment and  
Recommendations(SBAR). Information from the following report(s) SBAR and Kardex was reviewed with the receiving nurse. Opportunity for questions and clarification was provided. Assessment to be completed upon patients arrival to unit and care assumed.

## 2021-01-12 NOTE — PROGRESS NOTES
Received a call from RN that the patient wishes to change his CODE STATUS. He was initially partial code with the DO NOT INTUBATE. However patient expressed that he would like to be intubated if needed. CODE STATUS was changed to full code. Patient is currently on CPAP mode and is tachypneic oxygen saturations 85 to 90%. Arterial blood gas was ordered stat. Patient may need BiPAP. Asked RN to notify RT and switch to BIPAP mode.

## 2021-01-12 NOTE — DIABETES MGMT
Patient admitted with pneumonia due to COVID-19 virus. Blood glucose ranged  yesterday with patient receiving Lantus 40 units, Humalog 17 units, and Decadron 6mg. Blood glucose this morning was 210. Reviewed patient current regimen: Lantus 40 units nightly, Humalog 15 units with meals on hold per provider (noted patient has been requiring bipap and cpap), Humalog SSI, and Decadron 6mg PO q12h. Given patient condition will continue to follow along loosely.

## 2021-01-12 NOTE — PROGRESS NOTES
Ventilator check complete; patient has a #8. 0 ET tube secured at the 23 at the lip. Patient is sedated. Patient is not able to follow commands. Breath sounds are crackles on the upper right and middle of chest.  Trachea is deviated slightly to the left. Positive for subcutaneous air on the right. Patient is also Negative for cyanosis and is Negative for pitting edema. All alarms are set and audible. Resuscitation bag is at the head of the bed. Chest tube in being placed on right side. Ventilator Settings Mode FIO2 Rate Tidal Volume Pressure PEEP I:E Ratio VC+  100 %   24   440    8 cm H20  1:2.3 Peak airway pressure: 26 cm H2O Minute ventilation: 10.8 l/min ABG: No results for input(s): PH, PCO2, PO2, HCO3 in the last 72 hours. Buelah Kitchen

## 2021-01-12 NOTE — PROGRESS NOTES
OT NOTE: 
 
Pt with decline in status and is transferring to ICU, will d/c OT order at this time, please re-order when pt is medically stable.   
 
Michele Pac, OT

## 2021-01-12 NOTE — PROCEDURES
PROCEDURE: 
 28F chest tube placement INDICATION: 
Pneumothorax on the RIGHT . Summary: 
The RIGHT chest was prepped and draped in the usual fashion with chlorhexidine. The 5th and 6th IC space in the mid axillary line was identified and marked with needle cap. The skin, subcutaneous tissue and rib along with the IC muscles were also anesthetised with a total of 10cc of 1% lidocaine. Sterile technique was utilized during the entire procedure. An incision was then made in the skin followed by blunt dissection to the IC space previously marked as noted above. A curved hemostat was then used to penetrate the chest with a gush of air present upon ontry into the chest.  The index finger was then placed into the chest throught the skin opening to assure lack of lung adherence to the chest and the pleura swiped 360 degrees through the chest opening. Following this a 28F chest tube was inserted into the chest with the aid of a large curved hemostat/ provided plastic introducer without complication. 1.0 silk suture was used to close the lateral edge of the wound and affix the chest tube in place. It was secured at 18cm at the incision site. An atrium was then attached and the entire apparatus placed to -20 cm H2O of suction  Dressing was applied and chest tube drainage affixed to the chest with silk tape. There were no complications. EBL 3ml Ranulfo Davies MD

## 2021-01-12 NOTE — PROGRESS NOTES
Hospitalist Service Progress Note Chief Complaint : Concern For COVID-19 (Coronavirus) Subjective: 
Undergoing procedure Assessment / Plan: 
 
#1 
COVID-19 positive test (U07.1, COVID-19) with Acute Pneumonia (J12.89, Other viral pneumonia) -Status post convalescent plasma and remdesivir 
-Continued on steroids #2 acute respiratory failure, hypoxic 
-Patient required emergent intubation, chest tube placement, central line placement and arterial line placement CAD s/p CABG: Cont home ASA, statin, lisinopril metoprolol T2DM: SSI 
HTN: Cont home Lisinopril and metoprolol GERD: Pepcid BID Stage 3 CKD: Cr at baseline HLD: Cont home statin Vit D Def: Cont home supplements Objective: 
Visit Vitals /76 Pulse (!) 106 Temp 96.8 °F (36 °C) Resp 28 Ht 5' 10\" (1.778 m) Wt 73.9 kg (163 lb) SpO2 92% BMI 23.39 kg/m² Physical Exam: Unable to examine as pt was undergoing placement of Chest Tube IMAGING: 
Xr Chest Sngl V Result Date: 1/12/2021 Exam: XR CHEST SNGL V on 1/12/2021 3:23 PM Clinical History: The Male patient is 70years old  presenting for chest tube placement, ETT tube placement, subclavian CVC placement verification. Comparison:  Chest x-ray 1/11/2021 Findings:  Frontal view of the chest was obtained. There continued diffuse pulmonary infiltrates. A right apical chest tube has been placed with small residual pneumothorax is predominantly apical medial. There is continued suspected pneumomediastinum with increasing gas throughout the neck soft tissues suggesting worsening barotrauma. Endotracheal tube tip is at the superior clavicular margin. NG tube passes down the esophagus into the stomach. The cardiomediastinal silhouette is within normal limits. There are no acute osseous abnormalities. Sternotomy changes are demonstrated. Impression:  1. Tubes and lines as described, with satisfactory position. 2. Residual small right apical pneumothorax with continued diffuse bilateral pulmonary infiltrates. 3. Increasing subcutaneous emphysema throughout neck base suggesting worsening air leak. CPT code(s) 81100 Xr Chest Sngl V Result Date: 1/11/2021 CHEST X-RAY, one view. HISTORY:  COVID positive, decreased breath sounds on the left TECHNIQUE:  AP upright portable view COMPARISON: For January 2021 FINDINGS: Lungs: Increased atelectasis and infiltrate at both bases, left more than right. Vertical lucencies in the right lung suggesting pneumomediastinum. Costophrenic angles: Blunted on the left. Heart size: is normal. Pulmonary vasculature: is unremarkable. Aorta: Unremarkable. Included portion of the upper abdomen: is unremarkable. Bones: No gross bony lesions. Other: None. IMPRESSION:  New atelectasis or infiltrate left base and lucency suspicious for pneumomediastinum. Arina Yanez Patrica 80 Xr Chest Sngl V Result Date: 1/4/2021 Portable chest: History: worsening hypoxia. Comparison: 01/01/2021 Findings: A single view of the chest was obtained at 8:59 AM. The cardiac and mediastinal silhouette are normal in size and configuration. Patchy parenchymal opacities within the left midlung zone and right lung base have shown interval improvement. Underlying mild interstitial prominence remains bilaterally. There are no pleural effusions. Median sternotomy wires are present. Impression: Improving bilateral parenchymal opacities. Xr Chest Sngl V Result Date: 12/29/2020 EXAM: Chest x-ray. INDICATION: Dyspnea. COMPARISON: None. TECHNIQUE: Frontal view chest x-ray. FINDINGS: The heart is upper normal in size and there has been a prior sternotomy. There are hazy opacities in the lower lungs bilaterally, worse on the left. No pneumothorax, vascular congestion or pleural effusion is seen. IMPRESSION: Hazy bilateral lung opacities, possibly early pneumonia. Covid 19 pneumonia is a differential consideration. Xr Abd (kub) Result Date: 1/12/2021 Exam: XR ABD (KUB) on 1/12/2021 3:22 PM Clinical History: The Male patient is 70years old  presenting for OGT insertion verification. Comparison:  None Findings:  NG tube tip in fundus of stomach. Impression: 1. NG tube as described. CPT code(s) 19784 Xr Chest [facility] Result Date: 1/1/2021 EXAM: XR CHEST PORT INDICATION: cough COMPARISON: 12/29/2020 FINDINGS: A portable AP radiograph of the chest was obtained at 015 hours. The patient is on a cardiac monitor. Bibasilar airspace disease worse in the interval. The cardiac and mediastinal contours and pulmonary vascularity are normal.  Median sternotomy.   
 
IMPRESSION: Bibasilar atelectasis or pneumonia worse in the interval. 
 
 
 
Chele Nathan MD 
1/12/2021 6:26 PM

## 2021-01-12 NOTE — PROGRESS NOTES
Updated Ryann Thibodeaux (son) at [phone] concerning transfer to [address] [place] [address] and updated on plan of care at this time.

## 2021-01-13 NOTE — PROGRESS NOTES
Pharmacokinetic Consult to Pharmacist 
 
Hussain Jeanine is a 70 y.o. male being treated with vancomycin. Height: 5' 10\" (177.8 cm)  Weight: 64.2 kg (141 lb 8.6 oz) Lab Results Component Value Date/Time BUN 84 (H) 01/13/2021 03:07 AM  
 Creatinine 2.60 (H) 01/13/2021 03:07 AM  
 WBC 31.3 (H) 01/13/2021 03:07 AM  
 Procalcitonin 0.16 01/12/2021 02:30 PM  
 Lactic acid 1.6 01/01/2021 10:04 AM  
  
Estimated Creatinine Clearance: 23 mL/min (A) (based on SCr of 2.6 mg/dL (H)). CULTURES: 
pending Day 1 of vancomycin. Goal trough is 15-20. Vancomycin dose initiated at 1250 mg x 1, then will dose intermittently with increased renal markers. Will continue to follow patient and order levels when clinically indicated. Thank you, Davie Finley, PharmD, [facility] Clinical Pharmacist 
[phone]

## 2021-01-13 NOTE — PROGRESS NOTES
CODE BLUE NOTE 
[id number] Pt developed bradycardia followed by PEA arrest.  CPR promptly initiated and despite 21 minutes of resuscitative efforts including epinephrine, DCCV x 2, needle decompression of right chest, bicarbonate x 4, calcium chloride, ROSC was never achieved. Family en route to hospital now.  
Salvador Ta MD

## 2021-01-13 NOTE — PROGRESS NOTES
Ventilator check complete; patient has a #8. 0 ET tube secured at the 23 at the lip. Patient is sedated. Patient is able to follow commands. Breath sounds are crackles and diminished. Trachea is midline, Positive for subcutaneous air, and chest excursion is symmetric. Patient is also Negative for cyanosis and is Negative for pitting edema. All alarms are set and audible. Resuscitation bag is at the head of the bed. Ventilator Settings Mode FIO2 Rate Tidal Volume Pressure PEEP I:E Ratio VC+  90 % 28  440 ml     12 cm H20  1:1.5 Peak airway pressure: 33 cm H2O Minute ventilation: 12 l/min Jackson Dobbs, RT

## 2021-01-13 NOTE — PROGRESS NOTES
Ahsan Peralta [facility] COVID-19 Note: 
 
Critical Care Note: 1/13/2021 Jennifer Juan Admission Date: 1/1/2021     Length of Stay: 12 days Background: 70 y.o. y/o male with acute hypoxemic respiratory failure secondary to COVID-19, R pneumothorax, pneumomediastinum Onset of COVID-19 symptoms: 12/25, test positive on 12/30 Notable PMH: Coronary artery disease status post CABG, type 2 diabetes 24 Hour events:  
Intubated, chest tube placed in R chest, CVC and arterial line placed after desaturating on BiPAP on floor. UOP only 575ml ,+590mL fluid balance Leukocytosis climbed to 31.3 Creatinine up to 2.6 Pro-Benjamin only 0.16 and BNP level low 
 fingerstick blood sugars 408 next More tachycardic P:F ratio 82 ROS: intubated, sedated Lines: (insertion date) ETT: (1/12) Lala: (1/12) OGT: (1/12) Central line: (1/12) Arterial Line (1/12) Right 28 [place] chest tube (1/12) Drips: current dose (range) Levo 30 mcg/min (0-30) Vaso 0.04 units/hr Sesar 90 Versed 3 mg/hr (0-10) Fentanyl 50 mcg/hr (0-300) Tracrium 4 Visit Vitals BP (!) 147/74 Pulse (!) 132 Temp 98.3 °F (36.8 °C) Resp (!) 37 Ht 5' 10\" (1.778 m) Wt 141 lb 8.6 oz (64.2 kg) SpO2 91% BMI 20.31 kg/m² Pertinent Exam:           
Constitutional:  intubated and mechanically ventilated. EENMT:  Sclera clear, pupils equal, oral mucosa moist 
Respiratory:  Course, subcu emphysema Cardiovascular:   Tacky regular Gastrointestinal:  soft with no tenderness; positive bowel sounds present Musculoskeletal:  warm with no cyanosis, no lower extremity edema Skin:  no jaundice or ecchymosis Neurologic: Sedated and paralyzed Psychiatric: sedated and paralyzed Chest tube without air leakhas clot residing in center of tube CXR worsening R pneumo despite well positioned chest tube Pertinent Labs:  
Recent Labs  
  01/13/21 
0307 01/12/21 2020 01/12/21 
0744 01/11/21 
1036 01/10/21 
1608 WBC 31.3*  --  23.6*  --  20.4* HGB 13.6 14.5 15.5  --  14.3 HCT 42.4 44.8 46.2  --  42.2 *  --  474*  --  442 INR  --   --   --  1.0 1.0 Recent Labs  
  01/13/21 
0307 01/12/21 
0744 01/10/21 
1608 * 134* 134* K 6.0* 5.4* 4.5  
CL 96* 100 101 CO2 24 27 21 * 263* 281* BUN 84* 60* 64* CREA 2.60* 1.41 1.56* CA 9.0 10.1 9.6 ALB  --  2.7*  --   
TBILI  --  0.9  --   
ALT  --  47  --   
 
Recent Labs  
  01/12/21 
2256 01/12/21 
1619 01/12/21 
1144 GLUCPOC 408* 318* 299* SARS-CoV-2 LAB Results 12/30/2020- positive MICRO Date Source Result 1/1/2021  blood   NG 5 days 1/1/2021  blood  NG 5 days 1/12/21  sputum  pending 1/12/2021   urine  NG 1 day 1/12/2021  blood  pending 1/12/2021  blood  pending COVID-19 Meds: 
Dexamethasone 6mg daily (1/12) Convalescent plasma transfusion (1/1) Remdesivir (1/3-1/7) Anti-infectives (start date): 
Ceftriaxone x 1 1/1 
azithro x 1 1/1 Ventilator Settings:  5' 10\" (1.778 m) Mode FIO2 Rate Tidal Volume Pressure PEEP  
VC+  90 %    440 ml     12 cm H20 Peak airway pressure: 34 cm H2O Minute ventilation: 14.4 l/min ABG:  
Recent Labs  
  01/13/21 
0239 01/12/21 1837 01/12/21 
1505 PHI 7.20* 7.26* 7.29* PCO2I 55.7* 52.4* 50.1*  
PO2I 74* 73* 93 HCO3I 21.9* 23.5 24.3 Impression: 70 y.o. y/o male with acute hypoxemic respiratory failure secondary to COVID-19. NEURO:  
1. Sedation: Versed gtt 2. Analgesia: Fentanyl gtt 3. Paralytics: consider for severe hypoxemia or high ventilator pressures, poor synchrony with mechanical ventilation CV: 1. Volume Status: avoid fluids when possible. Gentle fluid bolus vs pressors for hypotension. Lasix if normotensive nd hypoxemic.  NO MAINTENANCE FLUIDS 
 2. Septic shock: need to under resuscitate, start pressors earlier to limit non-cardiogenic pulmonary edema. Other etiologies of shock is considering including cardiogenic shock due to COVID myocarditis-supportive care. PULM:  
1. Acute hypoxemic/hypercapneic respiratory failure:  Titrate Min Vent to pH/CO2. Day # 2 on ventilator 2. Severe ARDS 2/2 COVID: Low Tidal Volume ventilation, goal 6cc per kg (Ideal body weight: 73 kg (160 lb 15 oz) x 6 = 438ml). Attempt to limit driving pressure to 74ET/H6L or less. Allow permissive hypercapnia/acidosis to pH 7.25 if needed to achieve above. P:F ratio today low. Consider proning for 12 hours daily  severe ARDS with P:F <150. Consider inhaled Flolan if P:F<100. Paralytics if needed for dyssynchrony. 3. Pneumothorax. Placing second R-sided large bore tube. RENAL: 
1. CHRISTOPH: check CVP and consider IV fluids if dry (avoiding overresuscitation) 2. Electrolytes: replace per protocol, balance hypernatremia with free water flushes/NGT 
GI: NPO, PPI prophy 1. Nutrition: start TF, okay to do trophic feeds even on stable levophed <10mcg/min and/or paralytics HEME: no acute issues 1. . Anticoagulation: check d-dimer. May need more aggressive anticoagulation ID: WBC elevated, afebrile. 1. COVID-19: COVID meds as above, multi-D rounds with pharmacy to optimize meds with noted contraindications ENDO:  
1. DM: needs glucose stabilizer today. Skin: no decub, turns, preventive care Prophy: Heparin, H2b Full Code Family updated by phone after rounds. Vickie Ayala can be reached at [phone]. Spoke with Vickie Ayala and Kota Chase. The patient is critically ill with respiratory failure, circulatory failure and requires high complexity decision making for assessment and support including frequent ventilator adjustment , frequent evaluation and titration of therapies , application of advanced monitoring technologies and extensive interpretation of multiple databases. Cumulative time devoted to patient care services by me for day of service -40min Williams Duarte MD

## 2021-01-13 NOTE — PROGRESS NOTES
CPR in progress: 
 
Called family re: instructed to come to hospital. Spoke with son and he will bring mother to hospital. No answer Vincent Guardador , spouse phone. Alea Goetz Spouse [phone] [facility]ate Son   [phone]

## 2021-01-13 NOTE — ADT AUTH CERT NOTES
Pneumonia - Care Day 12 (1/12/2021) by Justyna Durham 
 
  
Review Status Review Entered Completed 1/13/2021 10:16  
  
Criteria Review Care Day: 12 Care Date: 1/12/2021 Level of Care: ICU Guideline Day 2 Level Of Care ( ) Floor 1/13/2021 10:16:53 EST by Justyna Durham   
  TRANSFERRED TO ICU Clinical Status   
(X) * No CO2 retention or acidosis 1/13/2021 10:16:53 EST by Justyna Durham   
  C02 27   
( ) * No requirement for mechanical ventilation 1/13/2021 10:16:53 EST by Justyna Durham   
  02 SATS % VENT   
(X) * Hypotension absent   
(X) * Afebrile or fever improved 1/13/2021 10:16:53 EST by Justyna Durham   
  T 97.4   
( ) * No hypoxia on room air or oxygenation improved 1/13/2021 10:16:53 EST by Justyna Durham   
  02 SATS 86% CPAP MASK 
RA SATS NOT CHECKED   
(X) * Mental status improved or at baseline Activity ( ) * Increased activity Routes ( ) Oral hydration, medications 1/13/2021 10:16:53 EST by Justyna Durham   
  SEE REVIEW   
(X) Usual diet Interventions (X) Pulse oximetry (X) Possible oxygen 1/13/2021 10:16:53 EST by Justyna Durham   
  PT INTUBATED, FI02 90% * Milestone Additional Notes Daily Progress Note: 1/12/2021 He has been unable to come off of CPAP 16 100% at all today.  O2 sats 84% upon entering room and desats to 72% with repositioning.  Only came up to 88% after much rest and pursed lip breathing.  Discussed status with pt and current status.    
   
EXAM: Constitution:  Appears uncomfortable, slightly anxious EENMT:  Sclera clear, pupils equal, oral mucosa moist  
Respiratory: crackles Cardiovascular:  RRR without M,G,R  
Gastrointestinal: soft and non-tender; with positive bowel sounds. Musculoskeletal: warm without cyanosis. There is no lower extremity edema. Skin:  no jaundice or rashes, none wounds Neurologic: no gross neuro deficits     
 Psychiatric:  alert and oriented x 3, slightly anxious today WBC 23.6, [, , K 5.4, GLUC 263, BUN 60, GFRNAA 52, ALB 2.7, GLOB 4.9, , NTproBNP 378, POC BLOOD GAS: PH 7.29,  PC02 50.1 on VENT  
CXR: 1. Tubes and lines as described, with satisfactory position. 2. Residual small right apical pneumothorax with continued diffuse bilateral pulmonary infiltrates. 3. Increasing subcutaneous emphysema throughout neck base suggesting worsening air leak. XR ABD KUB: NG tube tip in fundus of stomach.    
Plan:  (Medical Decision Making) --transfer to ICU for BIPAP, he is now a FULL CODE HE IS  [address]  
--has remained on CPAP 16 cmm 100% with O2 sats in mid 80s without activity  
--s/p remdesivir, s/p plasma  
--Continue decadron 6mg  (currently on day #5) - COVID positive on 12/31/2020  
--GI/DVT prophylaxis --start precedex infusion for respiratory status upon transfer to ICU DUVAL CATHETER, A LINE /CVC/CHEST TUBE PLACED,  PAIRED BLOOD CULTURES,   URINE CULTURE, LIPITOR 40MG Q HS PER NGT, TRACRIUM   37MG IV X1 & DRIP TITRATED, AMIDATE 22.18 MG IV X1,  DECADRON 6MG IV Q 24 HRS,  PEPCID 20 MG Q 2 4 HR [ER NGT, FENTANYL 50 MCG IV X1, 100 MCG IV X1 &   & DRIP TITRATED, ROCURONIUM 50MG IV X1,   LANTUS 40U SC Q HS, VERSED 2MG IV Q 2 HR PRN X1, LEVOPHED DRIP TITRATED,   VASOPRESSIN DRIP TITRATED,  CONTINUE DROPLET PLUS ISOLATION  
  
  
Pneumonia - Care Day 11 (1/11/2021) by Wilian Skaggs 
 
  
Review Status Review Entered Completed 1/12/2021 15:54  
  
Criteria Review Care Day: 11 Care Date: 1/11/2021 Level of Care: Telemetry Guideline Day 2 Clinical Status   
(X) * No CO2 retention or acidosis ( ) * No requirement for mechanical ventilation   
(X) * Hypotension absent 1/12/2021 15:54:00 EST by Wilian Skaggs   
  /77, P , R 20-26   
(X) * Afebrile or fever improved 1/12/2021 15:54:00 EST by Wilian KRISHNAMURTHY 97.8 ( ) * No hypoxia on room air or oxygenation improved 1/12/2021 15:54:00 EST by Bruce Chapa   
  RA SATS NOT CHECKED 
02  SATS 89-94% BIPAP, 90% 16L CPAP MASK   
(X) * Mental status improved or at baseline 1/12/2021 15:54:01 EST by Bruce Chapa   
  Neurologic:       No focal deficits Activity ( ) * Increased activity Routes   
(X) Oral hydration, medications 1/12/2021 15:54:01 EST by Bruce Chapa   
  SSI SC AC & HS (2U X1), OTHER SCHED MEDS ARE SAME   
(X) Usual diet Interventions (X) Pulse oximetry (X) Possible oxygen 1/12/2021 15:54:01 EST by Bruce Chapa   
  BIPAP & CPAP MASK * Milestone Additional Notes Hospitalist Progress Note    
Unable to get off bipap this morning, but tolerating it fairly well. Hypoxic when tried on airvo 100% No other events EXAM:  General:          Comfortable on bipap Lungs:             Coarse, diminished L base Heart:              Regular rate and rhythm,  No murmur, rub, or gallop Abdomen:        Soft, Non distended, Non tender, Positive bowel sounds Extremities:     No cyanosis, clubbing or edema Neurologic:       No focal deficits FIBRINOGEN 828, Postfach 71 GLUC  CXR:  New atelectasis or infiltrate left base and lucency suspicious for pneumomediastinum. Arina Burciaga 80 Plan:  
   
Acute respiratory failure with hypoxia 2/2 Covid-19 Pneumonia O2 sat at home 79% on RA and placed on 2L NC in ED. COVID POSITIVE on 12/30. CXR shows b/l atelectasis vs pneumonia. S/p plasma Dexamethasone S/p Remdesivir Proning as tolerated SSI while on steroids. Added basal and premeal.  
Oxygen supplementation--requiring 100% FiO2 for several days  
pulm following for NIV, not interested in intubation. D dimer flat, mild elevation.  
   
Now unable to come off bipap today after a relatively better day yesterday. Not uncomfortable.  Has diminished sounds L base, so will check XR for effusion.   
   
 CAD s/p CABG: Cont home ASA, statin, lisinopril metoprolol T2DM: hold home Actos. On SSI w/ POC BG qAC/HS. Added lantus. HTN: Cont home Lisinopril and metoprolol GERD: Pepcid BID Stage 3 CKD: Cr at baseline HLD: Cont home statin Vit D Def: Cont home supplements PULM NOTE:  
Plan:  (Medical Decision Making) --cont BIPAP he is on 100% and he is a [employer]  
--review repeat CXR once completed  
--Encourage side to side and consider pronation  
--s/p remdesivir, s/p plasma  
--Continue decadron 6mg BID (currently on day #4) - COVID positive on 12/31/2020  
--GI/DVT prophylaxis CONTINUE DROPLET PLUS ISOLATION  
   
  
  
  
Pneumonia - Care Day 10 (1/10/2021) by Connie Juan 
 
  
Review Status Review Entered Completed 1/12/2021 15:46  
  
Criteria Review Care Day: 10 Care Date: 1/10/2021 Level of Care: Telemetry Guideline Day 2 Clinical Status   
(X) * No CO2 retention or acidosis 1/12/2021 15:46:32 EST by Connie Juan   
  C02 21   
( ) * No requirement for mechanical ventilation 1/12/2021 15:46:32 EST by Connie Juan   
  02  SATS 90-95% BIPAP, 90% 60L HEATED HFNC   
(X) * Hypotension absent 1/12/2021 15:46:32 EST by Connie Juan   
  /74, P 86, R 18   
(X) * Afebrile or fever improved 1/12/2021 15:46:32 EST by Connie Juan   
  T 97.8   
( ) * No hypoxia on room air or oxygenation improved 1/12/2021 15:46:32 EST by Connie Juan   
  RA  SATS NOT CHECKED   
(X) * Mental status improved or at baseline Activity ( ) * Increased activity Routes   
(X) Oral hydration, medications 1/12/2021 15:46:32 EST by Connie Juan   
  DECADRON 6MG PO Q 12 HRS,  LISINOPRIL 10MG PO Q HS, HUMALOG 15U SC TID AC MEALS, SSI SC AC & HS (4U X2, 6U X2), LANTUS 40J SC Q HS, PEPCID 20MG PO BID, LOVENOX 10MG SC Q 12 HRS, LIPITOR 40MG PO Q HS, ASPIRIN 81MG PO QD,   
(X) Usual diet 1/12/2021 15:46:32 EST by Connie Juan   
  DIABETIC CONSIST  CARB Interventions (X) Pulse oximetry (X) Possible oxygen 1/12/2021 15:46:32 EST by Jordan Kimbrough   
  BIPAP & 60L HEATED HFNC   
* Milestone Additional Notes Daily Progress Note: 1/10/2021 (Pulmonary Team Day #2 Eval)  
   
No significant improvements or escalation of symptoms. SPO2 hovering between 90-92% on 100%/60L AIRVO without any activity. Requiring every bit of 100%/60L to maintain saturations. Used BIPAP through the night. Two ensures yesterday but no food due to drop in oxygenation. Patient has no complaints today and is very pleasant.   
   
EXAM:  Constitution:  the patient is well developed and in no acute distress. Very pleasant. EENMT:  Sclera clear, pupils equal, oral mucosa moist  
Respiratory: Inspiratory / expiratory crackles half way up the posterior lungs Cardiovascular:  RRR without M,G,R  
Gastrointestinal: soft and non-tender; with positive bowel sounds. Musculoskeletal: warm without cyanosis. There is no lower extremity edema. Skin:  no jaundice or rashes, none wounds Neurologic: no gross neuro deficits     
Psychiatric:  alert and oriented x 3 WBC 20.4, NEUTS 91, FIBRINOGEN 757, , GLUC 281, BUN 64, CREAT 1.56, GFRNAA 47, Plan:  (Medical Decision Making)  
   
--Bipap in room if needed. Utilized last night. --Continue Airvo 60L/100% --Encourage side to side and consider pronation  
--Increase ensure to qid given he isn't able to tolerate food consumption  
--Continue decadron 6mg BID (currently on day #3) - COVID positive on 12/31/2020  
--GI/DVT prophylaxis Hospitalist Progress Note    
Did better on bipap overnight. Back on airvo and satting low-mid 90s at rest. Desats to 85 after a few minutes conversation. No fevers. pulm following. Plan:  
   
Acute respiratory failure with hypoxia 2/2 Covid-19 Pneumonia O2 sat at home 79% on RA and placed on 2L NC in ED. COVID POSITIVE on 12/30. CXR shows b/l atelectasis vs pneumonia. S/p plasma Dexamethasone S/p Remdesivir Proning as tolerated SSI while on steroids. Added basal and premeal.  
Oxygen supplementation--requiring every bit of 100% FiO2 right now. pulm following. On and off bipap, not interested in intubation. D dimer flat, mild elevation.  
   
Leukocytosis: most likely 2/2 steroids.   
   
CAD s/p CABG: Cont home ASA, statin, lisinopril metoprolol T2DM: hold home Actos. On SSI w/ POC BG qAC/HS. Added lantus. HTN: Cont home Lisinopril and metoprolol GERD: Pepcid BID Stage 3 CKD: Cr at baseline HLD: Cont home statin Vit D Def: Cont home supplements  
  
  
  
Pneumonia - Care Day 9 (1/9/2021) by Rosa Bull 
 
  
Review Status Review Entered Completed 1/12/2021 11:58  
  
Criteria Review Care Day: 9 Care Date: 1/9/2021 Level of Care: Telemetry Guideline Day 2 Clinical Status   
(X) * No CO2 retention or acidosis   
(X) * No requirement for mechanical ventilation   
(X) * Hypotension absent 1/12/2021 11:58:40 EST by Rosa Bull   
  /76, P 91, R 22   
(X) * Afebrile or fever improved ( ) * No hypoxia on room air or oxygenation improved 1/12/2021 11:58:40 EST by Rosa Bull   
  SEE REVIEW   
(X) * Mental status improved or at baseline Activity ( ) * Increased activity Routes   
(X) Oral hydration, medications   
(X) Usual diet Interventions (X) Pulse oximetry (X) Possible oxygen 1/12/2021 11:58:40 EST by Rosa Bull   
  AIRVO 60L/100% as well as non-rebreather * Milestone Additional Notes Daily Progress Note: 1/9/2021 (Pulmonary Team Day #2 Nancyal) Denies shortness of breath at this point. Didn't sleep very well due to all the tubing/lines. Reports having urinated at least 3 times since the lasix at 1530 yesterday. No change in oxygen demands and does desat with extensive conversation. Currently on AIRVO 60L/100% as well as non-rebreather. Patient again stated he did not want to go to ICU. No use of Bipap within the past 24 hours. EXAM:  Constitution:  the patient is well developed and in no acute distress. Very pleasant. EENMT:  Sclera clear, pupils equal, oral mucosa moist  
Respiratory: Inspiratory / expiratory crackles half way up the posterior lungs Cardiovascular:  RRR without M,G,R  
Gastrointestinal: soft and non-tender; with positive bowel sounds. Musculoskeletal: warm without cyanosis. There is no lower extremity edema. Skin:  no jaundice or rashes, none wounds Neurologic: no gross neuro deficits     
Psychiatric:  alert and oriented x 3 FIBRINOGEN 796, D DIMER 0.73, POC GLUC 146-346 EKG: Normal sinus rhythm  Nonspecific T wave abnormality  
  
--Continue encouraging nutrition with ensure as the patient reports some hunger. --No significant improvement with low dose lasix. SPO2 at 94% when supine. Desats with conversation but does rebound. Placed on left sidelying with SPO2 stabilized at 92-93%. --Bipap in room is needed. No obvious signs of fatigue at this point  
--Continue decadron 6mg BID (currently on day #2) - COVID positive on 12/31/2020  
--GI/DVT prophylaxis   
--Critical condition but stable ALBUTEROL INH Q 6 HRS PRN X1, ASPIRIN 81MG PO QD, LIPITOR 40MG PO Q HS,  PEPCID 20MG PO BID,  LANTUS 40U SC Q HS, SSI SC AC & HS (8U X1, 10U X1, 4U X 1), HUMALOG 15U SC TID AC MEALS, LISINOPRIL 10MG PO Q HS,  DECADRON 6MG PO Q 12 HRS,   
  
  
Pneumonia - Care Day 8 (1/8/2021) by Mary Murry 
 
  
Review Status Review Entered Completed 1/8/2021 11:05  
  
Criteria Review Care Day: 8 Care Date: 1/8/2021 Level of Care: Telemetry Guideline Day 2 Clinical Status   
(X) * No CO2 retention or acidosis   
(X) * No requirement for mechanical ventilation   
(X) * Hypotension absent   
(X) * Afebrile or fever improved 1/8/2021 11:05:01 EST by Serge Noel   
  98.2   
( ) * No hypoxia on room air or oxygenation improved 1/8/2021 11:05:01 EST by Serge Noel   
  02 SAT 87-98% 60L HEATED HFNC 
RA SATS NOT CHECKED   
(X) * Mental status improved or at baseline Activity ( ) * Increased activity 1/8/2021 11:05:01 EST by Isela [address] PT per RN due to respiratory status. Will check back on patient at a later date/time if schedule permits Routes   
(X) Oral hydration, medications 1/8/2021 11:05:01 EST by Serge Noel   
  SSI SC  AC & HS (11U X1),  ECADRON 6MG PO Q 1 2HRS,  OTHER SCHED MEDS ARE SAME   
(X) Usual diet Interventions (X) Pulse oximetry (X) Possible oxygen 1/8/2021 11:05:01 EST by Serge Noel   
  02 60L HEATED HFNC   
* Milestone Additional Notes Hospitalist Progress Note    
  
Overnight increased airvo and needed to go on bipap for a few hours. He is back on airvo/nrb combo 100% FiO2 and desats with any movement or even taking mask off for a bit of food. Rapid breathing. Doesn't feel short of breath. The patient and his son via phone are updated on condition and options. They agree with bipap but affirm that he would not want vent or resuscitation. EXAM:  General:          No acute distress Lungs:             Coarse lung sounds Bilaterally. Heart:              Regular rate and rhythm,  No murmur, rub, or gallop Abdomen:        Soft, Non distended, Non tender, Positive bowel sounds Extremities:     No cyanosis, clubbing or edema Neurologic:       No focal deficits BP   108/65, P 63, R 19  
PT 15.3, FIBRINOGEN 716, D DIMER 0.76 Plan:  
   
 Acute respiratory failure with hypoxia 2/2 Covid-19 Pneumonia O2 sat at home 79% on RA and placed on 2L NC in ED. COVID POSITIVE on 12/30. CXR shows b/l atelectasis vs pneumonia. S/p plasma Dexamethasone for 10 days (1/1-1/10) S/p Remdesivir Proning as tolerated SSI while on steroids. Added basal and premeal.  
Oxygen supplementation--requiring every bit of 100% FiO2 right now. pulm consult. Would want bipap, not interested in intubation. D dimer flat, mild elevation.  
   
Leukocytosis: most likely 2/2 steroids.   
   
CAD s/p CABG: Cont home ASA, statin, lisinopril metoprolol T2DM: hold home Actos. On SSI w/ POC BG qAC/HS. Added lantus. HTN: Cont home Lisinopril and metoprolol GERD: Pepcid BID Stage 3 CKD: Cr at baseline HLD: Cont home statin Vit D Def: Cont home supplements  
   
DVT PPx: Lovenox SQ Code Status: DNR  
  
  
  
  
Pneumonia - Care Day 7 (1/7/2021) by Senait Barba 
 
  
Review Status Review Entered Completed 1/8/2021 10:57  
  
Criteria Review Care Day: 7 Care Date: 1/7/2021 Level of Care: Telemetry Guideline Day 2 Clinical Status   
(X) * No CO2 retention or acidosis 1/8/2021 10:57:49 EST by Senait Barba   
  C02 21   
(X) * No requirement for mechanical ventilation   
(X) * Hypotension absent   
(X) * Afebrile or fever improved 1/8/2021 10:57:49 EST by Senait Barba   
  T 98.4   
( ) * No hypoxia on room air or oxygenation improved 1/8/2021 10:57:49 EST by Senait Barba   
  02 SAT 86-91% 60L HEATED HFNC   
(X) * Mental status improved or at baseline 1/8/2021 10:57:49 EST by Senait Barba   
  Neurologic:       No focal deficits Activity   
(X) * Increased activity 1/8/2021 10:57:49 EST by Senait Barba   
  PT NOTE: making steady progress towards PT goals as noted by increased gait distance and activity tolerance. Will continue PT efforts. Routes   
(X) Oral hydration, medications 1/8/2021 10:57:49 EST by Margarita CARUSO  SC AC & HS (8U X 2, 2U X1, 6U X1), OTHER SCHED MEDS ARE SAME   
(X) Usual diet Interventions (X) Pulse oximetry (X) Possible oxygen 1/8/2021 10:57:49 EST by Margarita Cuello   
  02 60L HEATED HFNC   
* Milestone Additional Notes Hospitalist Progress Note    
Remains on 70% airvo Low energy, no dyspnea, rare cough.    
No cp, no n/v. EXAM: General:          No acute distress Lungs:             Coarse lung sounds Bilaterally. Heart:              Regular rate and rhythm,  No murmur, rub, or gallop Abdomen:        Soft, Non distended, Non tender, Positive bowel sounds Extremities:     No cyanosis, clubbing or edema Neurologic:       No focal deficits BP  112/70, P 63, R 20 WBC 11.5, RBC 3.56, HGB 11.3, HCT 33.7, PT 15.7, FIBRINOGEN 652, D DIMER 0.74, GLUC 280, BUN 35, TP 6.2, ALB 2.4, GLOB 3.8,  POC BLOOD GAS:  PC02 26.7, P02 64, HC03 18.1, s02 93 ON 60l heated HFNC  
  
CT CHEST: IV infiltrated during scan, RN aware, may need PICC team - [id number] -emw Plan:  
   
Acute respiratory failure with hypoxia 2/2 Covid-19 Pneumonia O2 sat at home 79% on RA and placed on 2L NC in ED. COVID POSITIVE on 12/30. CXR shows b/l atelectasis vs pneumonia. S/p plasma Dexamethasone for 10 days (1/1-1/10) Remdesivir for 5 days Prone as tolerated SSI while on steroids. Added basal and premeal.  
Oxygen supplementation--now on airvo, wean as tolerated.  
   
Leukocytosis: most likely 2/2 steroids.   
   
Elevated LFT: AST>ALT (wnl). Most likely secondary to viral infection. Improving. Hold Remdesivir if ALT>250.  
   
CAD s/p CABG: Cont home ASA, statin, lisinopril metoprolol T2DM: hold home Actos. On SSI w/ POC BG qAC/HS. Added lantus. HTN: Cont home Lisinopril and metoprolol GERD: Pepcid BID Stage 3 CKD: Cr at baseline HLD: Cont home statin Vit D Def: Cont home supplements  
   
DVT PPx: Lovenox SQ  
 Code Status: FULL  
   
Anticipated discharge: Pending clinical course as still on high O2. PT/OT ordered. CONTINUOUS PULSE OX, DROPLET PLUS ISOLATION  
  
  
Pneumonia - Care Day 6 (1/6/2021) by Mary Murry 
 
  
Review Status Review Entered Completed 1/7/2021 14:50  
  
Criteria Review Care Day: 6 Care Date: 1/6/2021 Level of Care: Telemetry Guideline Day 2 Clinical Status ( ) * No CO2 retention or acidosis 1/7/2021 14:50:02 EST by Mary Murry   
  C02 19   
(X) * No requirement for mechanical ventilation   
(X) * Hypotension absent 1/7/2021 14:50:02 EST by Mary Murry   
  /70, P 57-80   
(X) * Afebrile or fever improved 1/7/2021 14:50:02 EST by Mary Murry   
  T 98.1   
( ) * No hypoxia on room air or oxygenation improved 1/7/2021 14:50:02 EST by Mary Murry   
  RA SATS NOT CHECKED 
 
02 SATS 91-98% 50L HEATED HFNC, 90-92% 60L HEATED HFNC   
(X) * Mental status improved or at baseline Activity   
(X) * Increased activity 1/7/2021 14:50:02 EST by Mary Murry   
  PT NOTE: Mr. Daphne Garcia is making slow, steady progress towards PT goals as noted by increased gait distance and activity tolerance. Will continue PT efforts. Routes   
(X) Oral hydration, medications 1/7/2021 14:50:02 EST by Mary Murry   
  SEE REVIEW   
(X) Usual diet 1/7/2021 14:50:02 EST by Mary Murry   
  DIABETIC CONSIST CARB Interventions (X) Pulse oximetry 1/7/2021 14:50:02 EST by Mary Murry   
  SPOT CHECK OXIMETRY PRN   
(X) Possible oxygen 1/7/2021 14:50:02 EST by Mary Murry   
  02 SATS 91-98% 50L HEATED HFNC, 90-92% 60L HEATED HFNC   
* Milestone Additional Notes Hospitalist Progress Note  
   
1/6: changed over to Airvo overnight, on 70% FiO2. Felt poorly overnight, has been feeling much better since them.    
No cp, no n/v. EXAM:  General:          No acute distress Lungs:             Coarse lung sounds Bilaterally. Heart:              Regular rate and rhythm,  No murmur, rub, or gallop Abdomen:        Soft, Non distended, Non tender, Positive bowel sounds Extremities:     No cyanosis, clubbing or edema Neurologic:       No focal deficits WBC 14.6, RBC 4.09, HGB 13.1, HCT 38.7, FIBRINOGEN 644, D DIMER 0.62, C02 19, GLUC 218, BUN 37, GFRNAA 56, ALB 2.9, GLOB 4.2, , Plan:  
   
Acute respiratory failure with hypoxia 2/2 Covid-19 Pneumonia O2 sat at home 79% on RA and placed on 2L NC in ED. COVID POSITIVE on 12/30. CXR shows b/l atelectasis vs pneumonia. S/p plasma Dexamethasone for 10 days (1/1-1/10) Remdesivir for 5 days Prone as tolerated SSI while on steroids. Added basal and premeal.  
Oxygen supplementation--now on airvo, wean as tolerated.  
   
Leukocytosis: most likely 2/2 steroids.   
   
Elevated LFT: AST>ALT (wnl). Most likely secondary to viral infection. Improving. Hold Remdesivir if ALT>250.  
   
CAD s/p CABG: Cont home ASA, statin, lisinopril metoprolol T2DM: hold home Actos. On SSI w/ POC BG qAC/HS. Added lantus. HTN: Cont home Lisinopril and metoprolol GERD: Pepcid BID Stage 3 CKD: Cr at baseline HLD: Cont home statin Vit D Def: Cont home supplements  
   
DVT PPx: Lovenox SQ Code Status: FULL  
   
Anticipated discharge: Pending clinical course as still on high O2. PT/OT ordered.  
   
REMDESEVIR 100 MG IV Q 2 4HRS, VIT C 1000MG PO QD, ASPIRIN 81MG PO QD, LIPITOR 40MG PO Q HS, VIT D3 1 TAB PO QD, DECADRON 6MG PO QD,  LOVENOX 40MG SC Q 12 HRS, PEPCID 20MG PO BID,  LANTUS 30U SC Q HS, SSI SC AC & HS (4U X3), HUMALOG 11U SC TID AC MEALS,  LISINOPRIL 10MG PO Q HS, LOPRESSOR 25MG PO Q HS, THERA M W/IRON 1 TAB PO QD, ZINC SULFATE 220 MG PO QD, DROPLET PLUS ISOLATION  
  
  
Pneumonia - Care Day 5 (1/5/2021) by Uvaldo Calhoun 
 
  
Review Status Review Entered Completed 1/6/2021 11:13  
  
Criteria Review Care Day: 5 Care Date: 1/5/2021 Level of Care: Telemetry Guideline Day 2 Clinical Status   
(X) * No CO2 retention or acidosis 1/6/2021 11:13:05 EST by Uvaldo Calhoun   
  C02 22   
(X) * No requirement for mechanical ventilation   
(X) * Hypotension absent 1/6/2021 11:13:05 EST by Uvaldo Calhoun   
  /71, P 54   
(X) * Afebrile or fever improved 1/6/2021 11:13:05 EST by Uvaldo Calhoun   
  T 98.1   
( ) * No hypoxia on room air or oxygenation improved 1/6/2021 11:13:05 EST by Uvaldo Calhoun   
  02 SAT 90-94% 10L NC   
(X) * Mental status improved or at baseline Activity   
(X) * Increased activity Routes   
(X) Oral hydration, medications 1/6/2021 11:13:05 EST by Uvaldo Calhoun   
  LANTUS 25U SC Q HS, SSI SC AC & HS (4U X2, 8U X1, 6U X1),  SAME SCHED MEDS Interventions (X) Pulse oximetry (X) Possible oxygen * Milestone Additional Notes Hospitalist Progress Note    
  
1/5: sitting up in chair. Still remains on 10L but has been feeling less short of breath and able to move in room better. No peripheral edema, chest pain, abdominal pain, N/V, diarrhea or constipation.   
   
EXAM: General:          No acute distress Lungs:             Coarse lung sounds Bilaterally. Heart:              Regular rate and rhythm,  No murmur, rub, or gallop Abdomen:        Soft, Non distended, Non tender, Positive bowel sounds Extremities:     No cyanosis, clubbing or edema Neurologic:       No focal deficits Plan:  
   
Acute respiratory failure with hypoxia 2/2 Covid-19 Pneumonia O2 sat at home 79% on RA and placed on 2L NC in ED. COVID POSITIVE on 12/30. CXR shows b/l atelectasis vs pneumonia. S/p plasma Dexamethasone for 10 days (1/1-1/10) Remdesivir for 5 days Prone as tolerated SSI while on steroids. Added basal and premeal.  
Oxygen supplementation--10LNC.  Wean O2 as tolerated.  
   
Leukocytosis: most likely 2/2 steroids.   
   
 Elevated LFT: AST>ALT (wnl). Most likely secondary to viral infection. Improving. Hold Remdesivir if ALT>250.  
   
CAD s/p CABG: Cont home ASA, statin, lisinopril metoprolol T2DM: hold home Actos. On SSI w/ POC BG qAC/HS. Added lantus. HTN: Cont home Lisinopril and metoprolol GERD: Pepcid BID Stage 3 CKD: Cr at baseline HLD: Cont home statin Vit D Def: Cont home supplements  
   
DVT PPx: Lovenox SQ Code Status: FULL PHYSICAL THERAPY: Daily Note and PM Treatment Day # 3 ASSESSMENT:  
Mr. Marko Be presents in bedside chair again today, with increased activity tolerance today, including increased gait to 3 x 30ft. At end of session, he is up in bedside chair with all needs within reach.   
  
  
CONTINUES ON DROPLET PLUS ISOLATION

## 2021-01-13 NOTE — PROGRESS NOTES
Death pronouncement Patient identity confirmed and upon evaluation there was no response to verbal or painful stimulus. On auscultation there were no heart sounds, and no carotid pulse was present. No breathing efforts were noted, no pupillary responses present. Family notified and nursing supervisor aware. No plans for autopsy were expressed. Time of death: 1 Certifying MD: Alana Luna MD 
Pronouncing MD:  Alana Luna MD [place] license [id number].

## 2021-01-13 NOTE — PROGRESS NOTES
Ventilator check complete; patient has a #8. 0 ET tube secured at the 24 at the lip. Patient is sedated. Patient is not able to follow commands. Breath sounds are coarse and diminished. Trachea is midline, Negative for subcutaneous air, and chest excursion is symmetric. Patient is also Negative for cyanosis and is Negative for pitting edema. All alarms are set and audible. Resuscitation bag is at the head of the bed. Ventilator Settings Mode FIO2 Rate Tidal Volume Pressure PEEP I:E Ratio VC+  90 %    440 ml     12 cm H20  1:1.1 Peak airway pressure: 34 cm H2O Minute ventilation: 14.4 l/min ABG: No results for input(s): PH, PCO2, PO2, HCO3 in the last 72 hours.  
 
 
Aman Arsen Poe, RT

## 2021-01-13 NOTE — PROGRESS NOTES
Pt on 100% oxygen on ventilator. Also required replacement of chest tube this morning for clotted chest tube. Discussed with nursing at bedside further plan of care for patient. Hospitalist service will sign off at this time. Can be re-consulted when patient stable for transfer to medical floor.

## 2021-01-13 NOTE — PROGRESS NOTES
Nutrition Brief Note: Acknowledge consult for TF. Patient currently on Levo at 30, Sesar, and Vaso. Spoke with RN and planning to increase pressors soon. Will hold on TF until more stable. Nutrition to continue to follow. [address] SHIRA Delong on 1/13/2021 at 3:47 PM 
Contact: [phone]

## 2021-01-13 NOTE — PROGRESS NOTES
responded to code blue/death, met with family arriving at hospital, brought them up to CCU to meet with medical staff, offered support, prayer. Paula POST

## 2021-01-13 NOTE — PROGRESS NOTES
Chart reviewed and pt discussed in am IDR s/p tx from 8th floor to CCU covid positive isolation. Intubated/vent (day 1) now on 90% fio2. CT this am per MD. Paralyzed, sedation, insulin gtt ordered, and currently on pressors, levo, vaso, and eleonora. CM will continue to follow for any assist. LOS 11 days.

## 2021-01-13 NOTE — PROGRESS NOTES
Potassium 6.0, glucose 450, per Dr. Erwin Fees will give 20 units Humalog for BS.  1 amp of bicarb, kayexalate 15 grams every 6 hrs x 1 day, recheck potassium 2 hrs.

## 2021-01-13 NOTE — PROCEDURES
PROCEDURE: 
28F chest tube placement INDICATION: 
Pneumothorax on the RIGHT . PRIOR CHEST TUBE CLOGGED WITH CLOT Summary: 
The RIGHT chest was prepped and draped in the usual fashion with chlorhexidine. The 5th and 6th IC space in the mid axillary line was identified and marked with needle cap. The skin, subcutaneous tissue and rib along with the IC muscles were also anesthetised with a total of 10cc of 1% lidocaine. Sterile technique was utilized during the entire procedure. An incision was then made in the skin followed by blunt dissection to the IC space previously marked as noted above. A curved hemostat was then used to penetrate the chest with a gush of air present upon ontry into the chest.  The index finger was then placed into the chest throught the skin opening to assure lack of lung adherence to the chest and the pleura swiped 360 degrees through the chest opening. Following this a 28F chest tube was inserted into the chest with the aid of a large curved hemostat/ provided plastic introducer without complication. 1.0 silk suture was used to close the lateral edge of the wound and affix the chest tube in place. An atrium was then attached and the entire apparatus placed to -20 cm H2O of suction. Dressing was applied and chest tube drainage affixed to the chest with silk tape. There were no complications. EBL:4ML Janet Herbert MD

## 2021-01-13 NOTE — ROUTINE PROCESS
Guideline Guideline Number: [id number] Title: Management of the Patient with Mechanical Ventilation (including weaning) and ABCDE Bundle Effective Date:  03/00 Revised Date: 02/09, 03/10, 7/12, 5/13,10/13, 8/14, 11/17, 5/18, 2/19 Reviewed Date: 07/2015, 04/2016, 06/2017, 7/19, 10/2020 I. Policy:  Management of the patient requiring mechanical ventilation, including readiness to wean and weaning protocol. The information provided serves as a guideline for patient management. Included in this guidelines is the ABCDE Bundle to provide guidance to staff for evidence based management of pain, agitation/anxiety and delirium in the intensive care unit. The goals of critical care analgesia and sedation are to facilitate mechanical ventilation, to prevent patient and caregiver injury, and to avoid the psychological and physiologic consequences of inadequate treatment of pain, anxiety, agitation, and delirium by maintaining a light level of sedation. Pain occurs commonly in adult ICU patients, regardless of admitting diagnosis. Therefore, pain should be frequently assessed and analgesic medications titrated to prevent adverse effects associated with either inadequate or excessive analgesia. Once pain has been addressed, anxiolytic and/or antipsychotic medications can be utilized to treat unresolved agitation/anxiety and delirium, with the goal to prevent over- or under sedation by using the Rainey Agitation Sedation Scale (RASS). Assertive management of these issues has been shown to reduce costs, improve ICU outcomes such as successful extubation and ICU length of stay, and allow for patients to participate in their own care. II. Purpose: The respiratory care practitioner and the critical care RN will utilize the following guideline to provide the most efficient and effective management of mechanical ventilators and weaning and extubation processes. Goals of Treatment: 1. Adequate management of patients pain and discomfort while maintaining a light level of sedation (RASS score of 0 to -1) 2. Both chronic and acute sources of pain should be identified and treated. 3. Sedative agents should be considered if patient still expresses discomfort and/or is not at RASS goal of 0 to -1 despite adequate management of pain. 4. Patients requiring neuromuscular blockage must have continuous infusions of analgesic and sedative agents. III. Responsibility: Director Respiratory Care Services and all Respiratory Care Practitioners with documented competency as well as Critical Care RN staff. General Guidelines 1. Introduction to Ventilator Plan Phase 
a. Ventilator Management Phase, General Statement -  The plan should be initiated in patients who have a secure airway/require invasive mechanical ventilation (endotracheal or tracheostomy) only -   The provider determines the appropriate medications used for analgesia and agitation/anxiety along with the clinical pharmacist 
 
2. Monitoring Levels of Comfort 
a. Pain Assessment 
- Pain is monitored using the numerical scales - A pain assessment should be conducted, at a minimum, every 4 hours and as needed and per guidelines. - The level of pain should be determined as satisfactory by the patient based on patients baseline level of pain , considering any chronic pain that the patient may have. - If the patient is unable to communicate pain level, the nurse can assess for nonverbal indicators including facial grimacing, moaning, tachypnea, tachycardia, hypertension, diaphoresis, etc as a cue to begin further pain assessment. b.  Sedation Assessment - Sedation is monitored using the Rainey Agitation Sedation Scale (RASS) Target RASS RASS Description +4 Combative, violent, danger to staff 
  
+3 Pulls or removes tubes(s) or catheters; aggressive +2 Frequent nonpurposeful movement, fights ventilator +1 Anxious, apprehensive, but not aggressive  
0 Alert and calm  
-1 Awakens to voice (eye opening/contact) > 10 sec  
-2 Light sedation, briefly awakens to voice (eye opening/contact) < 10 sec  
-3 Moderate sedation, movement or eye opening. No eye contact  
-4 Deep sedation, no response to voice, but movement or eye opening to physical stimulation  
-5 Unarousable, no response to voice or physical stimulation  
 
-  Goal RASS is 0 to -1, unless otherwise specified by providers order. 
- Nursing staff should conduct the RASS every 4 hours and as needed to maintain goal RASS of 0 to -1. 
- If RASS is outside of goal range, discuss treatment options with provider. 
- A RASS score of +2 to +4 requires further assessment by the nurse. Causes of agitation/anxiety that should be considered include: 
a. Pulmonary -   endotracheal tube malposition or patency, mode of ventilation, pneumothorax, hypoxemia, hypercarbia 
b. Metabolic  hypoglycemia, hyponatremia, acute renal or hepatic failure 
c. Emotional upset  with information and awareness of critical condition, prognosis, need for surgical or invasive procedures, other interventions or complications, family or personal stressors 
- C. Sedation Assessment while using Neuromusclar Blocking Agents 
- D. Delirium Assessment 
a. the ICU ([facility]  ICU) 3. Analgesia The incidence of significant pain has been reported to be 50% or higher in both medical and surgical ICU patients. These patients also experience discomfort during routine/procedural ICU care and at rest.  However, patients may be unable to self-report their pain (either verbally or with other signs) because of an altered level of consciousness, the use of mechanical ventilation, or high doses of sedative agents or neuromuscular blocking agents. The short and long term consequences of unrelieved or inadequately treated pain are significant and include patient discomfort, decreased satisfaction with care by family and patient, delirium, agitation/anxiety, post traumatic stress disorder and depression. Therefore, routine assessment and treatment of pain should occur in all ICU patients. Causes and Treatment of Pain in the ICU 
a. Acute pain (post-operative, procedural pain, discomfort with usual ICU care or other acute episodes of pain-related to underlying disease) 1. Consider use of PCA for alert and oriented patients with pain needs not met by PRN dosing or opoids. 2. Preemptive analgesia should occur prior to chest tube removal, and should be considered for other procedural pain such as turning and repositioning, would drain removal, wound dressing change, tracheal suctioning, femoral catheter removal or place of central venous catheter. 3. Appropriate analgesic medications for preemptive analgesia are short acting intravenous (IV) agents (i.e. fentanyl, morphine, hydromorphone) a. Administration of analgesia before patient experiences noxious stimuli prevents amplification and hyperexcitability of the central nervous system. b. Analgesia for Mechanically Ventilated Patients: 
1. The approach to sedation and analgesia management for mechanically ventilated patients favors use of analgesia first sedation. The primary goal of this strategy is to address pain and discomfort first, and then if necessary, add anxiolytic agent. 2. Analgesia first sedation reduces dose escalation of medications, decreases the duration of mechanical ventilation and the incidence of VAP, improves the probability of successful extubation, and ultimately shortens ICU length of stay. 3. For pain management, analgesic medications are determined by the provider.    Intermittent dosing of the analgesic should be attempted first.   
 If the patient requires more than 3 doses within 1 hour then provider should be contacted to consider continuous infusion. 4.  Analgesic options for mechanically ventilated patients include: 
a. Fentanyl which is considered the drug of choice for patients requiring continuous infusion. b.Morphine may be considered for those patients without renal dysfunction who are hemodynamically stable and require intermittent pain medication. Continuous infusions of morphine may be used for patientl who are receiving comfort care as part of end of life care. c.Hydromorphone is reserved for patients who are refractory to fentanyl or morphine and is typically admininstered by intermittent dosing. 4.  Agitation/Anxiety Background Agitation and anxiety frequently occur in ICU patients. Anxiolytic/sedation agents may be indicated to help relieve discomfort, improve synchrony with mechanical ventilation, and decrease the overall work of breathing. Pain control alone may be sufficient to make patients comfortable enough to require no anxiolytic/sedative agent. In addition, non-pharmacologic interventions such as repositioning or verbal assurance may be helpful to comfort or redirect an agitated patient. If these methods are unsuccessful, then anxiolytic/sedative medications such as propofol, dexmedetomidine, or benzodiazepines can be used. Selection of an anxiolytic should be based on the pharmacokinetic properties of the medication, patient specific characteristics, and sedation goal.   However, nonbenzodiazepine sedatives (ie propofol or dexmedetomidine) may be preferable over benzodiazepines (ie midazolam or larazepam) due to more favorable outcomes such as delirum. Causes and Treatment of Agitation/Anxiety 
a. Possible underlying causes of agitation and anxiety include pain, delirium, hypoxemia, hypoglycemia, hypotension, or withdrawal from alcohol  and other drugs. b. Analgesia first sedation should be attempted initially to manage pain and provide sedation in appropriate patients. Analgesia alone may be adequate to reach RASS goal of 0 to -1. If patient remains agitated or anxious despite adequate analgesia (ie RASS +2 to +4) then anxiolytic/sedative should be considered. c. The choice of anxiolytic should be based on desired levels of sedation (ie light sedation or deep sedation) with preference for the use of nonbenzodiazepines such as propofol or dexmedetomidine if appropriate. While light sedation (ie RASS 0 to -1) is preferred for most patients, there are instances when deep sedation (ie RASS -4 to -5) is desired. For example, in the setting of ventilator dysynchrony due to ARDS or for patients receiving NMB agents. d. Medications to maintain light sedation (ie RASS 0 to -1) include 1. Propofol continuous infusion can be considered for hemodynamically stable (ie SBP = 100, MAP = 65 and/or not requiring vasopressor support) patients requiring light sedation. Propofol has a quick onset (1-2 minutes) and offset of action, making it a good agent to assess neurological status and facilitate liberation from the mechanical ventilator. 2.Dexmedetomidine continuous infusion is a good option for hemodynamically stable patients requiring light sedation as it allows for a more awake, interactive patient is associated with less delirium. It has an intermediate onset of action (5-10 min). Therefore, abrupt titrations should be avoided, but use of prn haloperidol or benzodiazepine may be useful to manage agitation until the medication takes effect. 3. Antipsychotics are another option. In particular, haloperidol intermittently dosed may be useful for patients with symptoms of agitation/anxiety and delirium. 4.Benzodiazapines can also be considered for light sedation, but should be intermittently doses. Midazolam is an option for patients without renal dysfunction. It has a short onset of action (2-5 minutes) making it a good agent for acute agitation/anxiety, but short duration of action resulting in frequent dosing. Lorazepam is another option. It has a longer onset of action (15-20 minutes) in comparison to midazolam, but longer duration of action. e. Medications to maintain deep sedation (RASS -4 to -5) include: 
1) Propofol continuous infusion should be considered as a first line option for hemodynamically stable patients. 2)Benzodiazepines can be considered as second line options for deep sedation. Studies comparing these agents to other sedatives have shown that they lead to worse outcomes including delirium, oversedation, delayed extubation, and longer time to discharge. Midazolam is one option for patients without renal dysfunction and lorazepam is another options. If patient requires more than 3 doses within 1 hour then contact provider  to consider initiation of continuous infusion. 5.  Daily Sedation Awakening Trial (SAT) from IV Continuous Analgesia/Sedation 
a. Patients are to have daily awakening from sedation while on continuous IV analgesia and/or sedation in the ICU. Continuous analgesia infusions may be maintained only if needed for active pain and RASS is at goal 0 - -1. Unit guideline is for the SAT to occur following ICP rounds each morning.   
b. The sedation awakening trial (SAT) is done regardless if the patient meets criteria for spontaneous breathing trial (SBT). c. SAT safety screen is assessed and SAT should not be performed if sedation is being used for active seizures, alcohol withdrawal, hemodynamically unstable or requiring support of vasoactive medications , in conjunction with NMB agents, if ICP is greater than 20mmHg or if sedation is being used to control ICP, patients RASS is +3 or +4 (very agitated or combative). Other exclusion criteria are:  if there is documentation of myocardial ischemia in the past 24 hours; or patient is receiving high frequency oscillator ventilation (HFOV) , if the patient has an open chest /abdomen or is receiving comfort care. d. Criteria for passing the SAT are the patient opened their eyes to verbal stimuli or tolerated sedative interruption without exhibiting failure criteria. 
e. Patients fail the SAT if the develop sustained anxiety, agitation, or pain; a respiratory rate of 35 per minutes for 5 minutes or longer, an SpO2 less than 88% for 5 minutes or longer; an acute cardiac dysrhythmia; two or more signs of respiratory distress including tachycardia, bradycardia; use of accessory muscles; diaphoresis; marked dyspnea; or myocardial ischemia. f. Respiratory therapy staff must verify with the nurse that continuous IV analgesia (unless being use  for active pain) and sedation (unless patient is receiving dexmedetomidine) is off prior to placing patient on SBT. g. DO NOT interrupt infusion of analgesia and sedation medications if patient is receiving neuromuscular blockade. 
h. Monitor level of wakefulness unless patient is awake and follows commands (RASS 0 to -1) or patient becomes uncomfortable or agitated (RASS +3 to +4) 
i. If agitation prevents successful awakening , administer bolus of analgesia and/or sedation then resume infusion of the medication at ½ previous dose and titrate as needed. 
j. If oversedation prevents successful awakening, hole infusion until at goal and resume ½ of prior infusion rate/dose if clinically indicated. 6. Delirium Background Delirium is characterized by the acute onset of cerebral dysfunction with a change or fluctuation baseline 
mental status, inattention, and either disorganized thinking or an altered level of consciousness. It affects up to 80% of mechanically ventilated adult ICU patients, and is associated with increased mortality,  
and treatment is important and may in turn allow for a patient to be conscious yet cooperative enough to participate  
in ventilator weaning trials and early mobilization efforts. Delirium can only be assessed in patients who are able to sufficiently interact and communicate with bedside 
clinicians (ie RASS -3 to +4). IV. Procedure: A. Assessment: The following criteria must be assessed prior to the initiation of weaning from mechanical ventilation. Note: The criteria are general guidelines and must be individualized for each patient. The patients primary nurse will be responsible, in coordination with the RT, the Spontaneous Awakening Trial). The RT will perform the SBT. B. Spontaneous Awakening Trials (SATs  also referred to as Sedation Vacation) and Spontaneous Breathing Trials (SBTs) performed to determine readiness to wean. 1. For patients who meet established criteria, such as those without active seizures, alcohol withdrawal and agitation, myocardial ischemia or those requiring cardiac support devices, without increased intracranial pressure and those not receiving neuromuscular blockade, the nurse will reduce the infusions of sedative by 50% of current used for sedation that was used to achieve a level of light sedation (Perez Score 2 or RASS score of 0 to -1) and evaluate patient response to reduction of sedation. Analgesics required for pain control are continued during the test.  Obtain MD order to cover no SAT for that time period if patient has any exclusion criteria as described above. 2. Failure of the spontaneous awakening trial occurs when the patient shows symptoms such as increased agitation, anxiety, pain or signs of respiratory distress including respiratory rate >35/min or oxygen saturation <88% as well as development of acute cardiac arrhythmias. If these symptoms develop during the SAT, the nurse then restarts sedation at 75% of the previous dose and titrates the medications until the patient is comfortable and/or symptoms have abated. 3.  If the patient passes the SAT then the patient moves on to the Spontaneous Breathing Trials as performed by the RT. The SBT Safety Screen included the following:  No agitation, oxygen saturation > 88%, FIO2 < 50%, PEEP < 7.5 cm H20, no myocardial ischemia, no vasopressor use, and with inspiratory efforts. 4. Patients who pass the spontaneous awakening trial but fail the spontaneous breathing trial are placed back on full ventilator support and reassessed the next day. 5. Failure of the SBT (spontaneous breathing trail) includes any of the following:  Respiratory rate > 35/min, respiratory rate < 8/min, oxygen saturation < 88%, respiratory distress, mental status change, acute cardiac arrhythmia. 6. Extubation is considered for patients who tolerate the spontaneous awakening trial and pass the spontaneous breathing trial.   
C. Can the cause of respiratory failure be reversed (i.e. absence of high spinal cord injury or advanced ALS)? D. Is gas exchange adequate? 1. PaO2/FIO2 ratio > 150  200, 2. PEEP < 8 cm H20 
3. FIO2 < 50 
4. pH > 7.30 
5. Rapid shallow breathing index (f/VT) < 105 
E. Is patient hemodynamically stable? 1. Absence of clinically significant hypotension (minimal vasopressors such as Dopamine < 5mcg/kg/minute)? F. Is there evidence of intact respiratory drive (NIP/NIF >-23 [id number], stable VC02)? G. Does patient have an adequate cough, airway clearance ability? H. Is there absence of excessive secretions? V. Initiation: A. The therapist shall consult with RN to determine if sedation can be discontinued or significantly decreased. If this can be achieved, the therapist shall implement the  
                  followin. Identify patient and verify name and account number via ID bracelet. 2. Perform hand hygiene per hospital policy utilizing Standard Precautions for all patients and following transmission-based isolation as indicated per hospital policy. 3. Perform a ONE-MINUTE SPONTANEOUS TRIAL AND ASSESSMENT. 4. Measure Rapid Shallow Breathing Index (RSBI) and monitor SpO2 and cardiovascular parameters during the spontaneous breathing assessment. 5. If SpO2 and cardiovascular parameters are stable, continue spontaneous breathing trial for at least 30 minutes and up to 120 minutes, as patient tolerates. 6. Monitor ventilatory status, SpO2, and cardiovascular status during spontaneous breathing trial. 
7. If patient has a successful trial, consider patient as a candidate for extubation and obtain order. 8. If patient fails the weaning trial, place back on ventilator and adjust settings to provide a non-fatiguing form of ventilatory support for the remainder of the day and night. 9. One attempt at weaning shall be performed each day until successful weaning occurs. The RCP will make every attempt to begin the spontaneous breathing trials between 0500 and 0600 to provide documentation of the trial when the pulmonologist makes rounds. B.  Assessment of SBT or PST: 
1. Is gas exchange acceptable? 2. PaO2 > 60 mm Hg. 3. PH > 7.30 
4. Increase in PaCO2  < 10 mm Hg C. Is patient hemodynamically stable? 1. HR < 120 beats/minute 2. HR  < 20% 3. Systolic BP < 915 and > 90 mmHg 4. BP  < 20%, no vasopressors required D. Does patient have stable ventilatory pattern? 1. Sustained RR < 30 breaths per minute 2. Normal and stable VCO2 3. Patient is not demonstrating any signs of increased work of breathing, such as increased use of accessory muscles. E. Mental status stable throughout trial? 
1. Absence of changes such as somnolence, excessive agitation or anxiety 2. Absence of diaphoresis during trial? 
IV. Safety: A. The RCP shall monitor patient according to the above guidelines. If at any time during the weaning process, the respiratory therapist or nurse feels that the patient is not tolerating weaning, the therapist shall place patient back on previous ventilator settings. B. The patient shall be reassessed and the weaning process should be continued the following day. V. Reportable Conditions: A. The therapist shall notify the physician, as appropriate, for any of the following         conditions: 1. FIO2 increase (sustained) at 10% or greater 2. Poor patient/ventilator interface in spite of adjustments 3. Need for increased sedation for respiratory distress 4. Need for increasing ventilating pressures (i.e. PEEP, PIP, MAP) 5. ABG results meeting panic value criteria or other clinical signs indicating deterioration of patients condition. 6. Unplanned extubation. 7. Unexplained sustained increase in PIP greater than 10 cm H2O. 
8. Assessment results regarding ventilator discontinuance process. VI. Ventilator protocol management A. The following items should be maintained for patients who are being mechanically           ventilated. 1. Obtain STAT Chest X-Ray for ET tube placement after insertion. 2. Chest X-Ray q a.m. while on ventilator. 3. ABGs 30 - 60 minutes after being stable on the ventilator. 4. ABG's q a.m. while on ventilator and prn. 
5. Do spontaneous breathing trials when patient is hemodynamically stable, responsive, and without fever. 6. Terminate trials if patient exhibits signs of respiratory distress. 7. Therapists should maintain ABG s as follows: a. pH -  7.30 - 7.50              
    b. PaO2 -   60  100  
     8. Racemic Epinephrine (0.5cc) for post extubation stridor (2 UA treatments max.) 
 
VII. Early Mobilization Mobility Level Criteria Start at Level 1 if:  
PaO2/FIO2 <250 Positive end-expiratory Pressure (PEEP) >=10 cm H2O  
O2 saturation <90% Respiratory Rate (RR) Not within 10-30 per min Cardiac arrhythmias or ischemia New onset Heart Rate  (HR) <60 or >120 beats per min Mean arterial pressure (MAP) <55 or >140 mmHg Systolic blood pressure (SBP) <90 or > 180 mmHg Vasopressor infusion New or increasing Rainey Agitation Scale (RASS) < - 3 Level I:   Breathe  (Rass -5 to -3) HOB Angle  improve VAP protocol compliance ? Visually confirm the [facility] is elevated >= 30 degrees to comply with VAP prevention protocols ? The Centers for Disease Control and Prevention recommends an HOB angle of 30-45 degrees , unless contraindicated Additional activities to be implemented ? Every 2 hour turning ? Passive range of motion ? Up to 20 degrees reverse trendelenburg with lower extremity exercise/retracting footboard ? Continuous lateral rotation therapy can be considered part of early mobility therapy in patients who are at high risk for pulmonary complications Move to Level 2 when the patient 
- Has acceptable oxygenation/hemodynamics - Tolerates q 2 turning - Tolerates HOB > 30 degrees or up to 20 degrees reverse trendelenburg Level 2 :Tilt  Patient Assessment Rass > -3  (eg, opens eyes, may have profound weakness) Up to 20 degrees Reverse Trendelenburg position and 10 degrees minimum HOB 
- Reverse Trendelenburg positioning allows for orthostatic position in fragile patients - If available , use in conjunction with retracting foot section to allow for partial weight bearing prior to sitting up in the bed or getting out of bed Additional activities to be implemented -  Maintain HOB >/= 30 degrees - Q 2 hour turning - Passive/active range of motion - Legs dependent - PT consultation Move to Level 3 when the patient . Kelleaalexandra Snare -Tolerates active- assistance exercises 2 times per day 
  -Tolerates lower extremity exercises against footboard/Up to 20 degrees Reverse Trendelenburg 
  -Tolerates legs dependent /HOB 45 degrees Level 3 :  SIT  (Rass >- 1 (eg , weak but may move arms/legs independently) Full chair position (footboard on) ? Full upright positioning allows for diaphragmatic excursion and lung expansion ? Sitting with legs in a dependent position facilitates gas exchange Additional activities to be implemented - Maintain HOB >= 30 degrees - Q 2 hour turning (assisted) - Active range of motion  PT/OT actively involved - Encourage activities of daily living 
- Dangling, if patient can move arm against gravity Move to Level 4 when the patient . Jessy Snare - Tolerates increasing active exercise in bed - Actively assists with every- 2- hour turning or turns independently - Tolerates full chair position 3 times/day Level 4:  Stand ( RASS >0 (eg, weak but may tolerate increased activity) Stand Attempts ? Full chair position (footboard off/feet on the floor) ? Consider using a sit-to-stand lift ? Pivot to chair, it tolerates partial weight bearing Additional activities to be implemented - Maintain head of bed >= 30 degrees - Q 2 hr turning (self/assisted) - Active range of motion - Encourage activities of daily living 
- PT/OT actively involved Move to Level 5 when the patient . 
- Can successfully comply with all activities - Tolerates trial periods of full chair position (footboard off/feet on floor) 3 times per day - Tolerates partial weight-bearing stand and pivots to chair Level 5 :  Move  (RASS > 0    (eg, weak but may tolerate increased activity) Achieve out of bed ? Utilize mobile floor life to ambulate to bedside chair Additional activities to be implemented - Maintain HOB > = 30 degrees - Q 2 hour turning (self/assisted) - Active range of motion - Patient stands/bears weight > 1 minute - Patient marches in place 
- PT/OT actively involved Patient continues to ambulate progressively longer distances as tolerated until they consistently participate and move independently. E Approved by Critical Care Committee 2-19-09 [facility] Clinical Guidelines ABCDE DOC Flowsheet Content Variables to select when addressing section Comments ABCDE Initiated ? Yes/No  RN to address minimum q 24 hours (day shift) Target RASS ? 0 = alert and oriented ? -1 = drowsy ? -2 = light sedation ? -3= moderate sedation ? -4= deep sedation Target on standard ventilator setting should be -2; -4 with oscillator CAM -ICU ? Positive ? Negative ? Unable to assess Delirium assessment SAT Safety Screen Passed ? Yes 
? No Select yes if proceeding on to the sedation vacation (reduction of continuous sedative drip by directed by MD) Select no if your patient has any of the below reasons for not proceeding on to the daily awakening sedation vacation trial  
SAT Screen for Failure ? Active seizures ? Acute delirium tremors ? Agitation that threatens accidental line/tube removal 
? On paralytics ? MI (24-48hr) ? Abnormal ICP ? Open abdomen Select one of the options when the patient will not undergo the sedation vacation  ALSO MUST OBTAIN AN ORDER FOR ramon Lim written under nursing miscellaneous for now by either the NP or Intensivist  
Daily sedation Vacation/assessment of  ? Yes 
? No 
? Not applicable If yes, MUST see the reduction in sedation on the [facility] and please place in the comment section of the sedative sedation vacation started SBT Safety Screen Passed ? Yes 
? No Select Yes if the patient has none of the below listed reasons for not proceeding on to the SBT following reduction of sedation SBT Screen Reason for Failure ? Agitation ? O2 Sat < or = 88% 
? FIO2 > 50% ? PEEP >7 
? MI 
? Vasopressor Use 
? Bilevel setting on Vent ? Oscillator in use ? Increased resp effort Select reason as appropriate for NOT proceeding on to the SBT Wake Up and Breathe Protocol 05/2013

## 2021-01-13 NOTE — DIABETES MGMT
Patient admitted with pneumonia due to COVID-19 virus. Patient transferred to unit yesterday and is now intubated on ventilator. Blood glucose ranged 210-408 yesterday with patient receiving Lantus 40 units, Humalog 22 units, and Decadron 12mg. Blood glucose this morning was 450. Reviewed patient current regimen: 40 units nightly, Humalog SSI, Humalog 15 units with meals on hold, and Decadron 6mg daily. Updated provider via [employer] patient would likely benefit from normal SSI to resistant SSI q4h and will likely need an increase in basal insulin. Will continue to follow along.

## 2021-01-13 NOTE — PROGRESS NOTES
Patient went bradycardic and then PEA on monitor at 1642. No pulse palpable, compressions started and code blue called. Dr. Devora Arevalo at bedside. Epi x 6, Bicarb x 4, calcium x 1, Amio 300 mg, 2 g Mag all given during the code. Vfib twice on monitor shocked at 200J x 2. Patient pronounced by Dr. Devora Arevalo. TOD 1704. See code flow sheet for full details.

## 2021-01-13 NOTE — PROGRESS NOTES
Upon initial shift assessment, patient right chest tube dressing noted to be soaked with dark red blood, bandages removed, pulsating blood at sight of tube insertion, covered with surgicel, petrolatum dressing, gauze pad, ABD and secured with Tegaderm and tape, stat H&H completed and sent to lab. Blood pressure MAP at 64  with Levophed at 16.   Dr. Simpson informed, order placed for vasopressin, will continue monitor H&H.

## 2021-01-14 LAB
BACTERIA SPEC CULT: NORMAL
SERVICE CMNT-IMP: NORMAL

## 2021-01-14 NOTE — DISCHARGE SUMMARY
Death Summary Geraldo Barrett Admission date:  1/1/2021 Discharge date:  1/13/2021 Admitting Diagnosis:  Acute respiratory failure with hypoxia ([facility] [address] [J96.01] Pneumonia due to COVID-19 virus [U07.1, J12.82] Discharge Diagnosis:   
Problem List as of 1/13/2021 Date Reviewed: 4/27/2020 Codes Class Noted - Resolved Leukocytosis ICD-10-CM: W03.795 ICD-9-CM: 288.60  1/4/2021 - Present Acute respiratory failure with hypoxia [facility]) ICD-10-CM: J96.01 
ICD-9-CM: 518.81  1/1/2021 - Present * (Principal) Pneumonia due to COVID-19 virus ICD-10-CM: U07.1, J12.82 ICD-9-CM: 480.8  1/1/2021 - Present Mixed hyperlipidemia ICD-10-CM: E78.2 ICD-9-CM: 272.2  1/1/2021 - Present Gastroesophageal reflux disease ICD-10-CM: K21.9 ICD-9-CM: 530.81  1/1/2021 - Present Stage 3a chronic kidney disease ICD-10-CM: N18.31 
ICD-9-CM: 585.3  1/1/2021 - Present Vitamin D deficiency ICD-10-CM: E55.9 ICD-9-CM: 268.9  1/1/2021 - Present Other chest pain ICD-10-CM: R07.89 ICD-9-CM: 786.59  4/27/2020 - Present Coronary artery disease involving native coronary artery of native heart with angina pectoris ([facility] ICD-10-CM: I25.119 ICD-9-CM: 414.01, 413.9  4/21/2020 - Present Overview Signed 1/1/2021 11:17 AM by Jose Felix DO [facility] Cardiol. Abn ETT led to 3v CABG. 1990s? Type 2 diabetes mellitus (HCC) ICD-10-CM: E11.9 ICD-9-CM: 250.00  7/23/2018 - Present Essential hypertension ICD-10-CM: I10 
ICD-9-CM: 401.9  9/15/2010 - Present Overview Signed 1/1/2021 11:17 AM by Jose Felix DO Last Assessment & Plan:  
Patient is stable on medications Consultants:  Hospitalist admitted, [facility] [address] Pulmonary consulted Studies/Procedures:  Intubation - 1/12 CVC placement - 1/12 Art line - 1/12 Right chest tube placed - , replaced  Multiple CXRs Hospital course:  Admitted through the ER with acute respiratory failure secondary to COVID 19. He had been sick with fever, chills, cough, myalgias and fatigue for about 5 days and had tested positive on . He was started on Decadron, Rocephin and Zithromax in the ER and then Remdesivir and Plasma given shortly after admission. He was hypoxic on admission and this worsened over the next several days. He was seen by pulmonary medicine. He initially was a DNR. Supportive measures were continued. On , he decompensated further. He changed his category status to a full code and expressed desire to be intubated if needed. He was transferred to the ICU and intubated there. His CXR at that time showed a right pneumothorax so a chest tube was placed. This tube had to be replaced on  due to a clot. He continued to decompensate requiring maximum ventilator support. He experienced a cardiac arrest on  and subsequently . Final: 
--Pronounced dead at 1735 - pronounced by Dr. Jocelyn Gracia --Total discharge greater than 30 minutes in duration.  
 
Seven Milan NP

## 2021-01-15 NOTE — ADT AUTH CERT NOTES
Pneumonia - Care Day 10 (1/10/2021) by Venkat Sandoval 
 
  
Review Status Review Entered Completed 1/12/2021 15:46  
  
Criteria Review Care Day: 10 Care Date: 1/10/2021 Level of Care: Telemetry Guideline Day 2 Clinical Status   
(X) * No CO2 retention or acidosis 1/12/2021 15:46:32 EST by Venkat Sandoval   
  C02 21   
( ) * No requirement for mechanical ventilation 1/12/2021 15:46:32 EST by Venkat Sandoval   
  02  SATS 90-95% BIPAP, 90% 60L HEATED HFNC   
(X) * Hypotension absent 1/12/2021 15:46:32 EST by Venkat Sandoval   
  /74, P 86, R 18   
(X) * Afebrile or fever improved 1/12/2021 15:46:32 EST by Venkat Sandoval   
  T 97.8   
( ) * No hypoxia on room air or oxygenation improved 1/12/2021 15:46:32 EST by Venkat Sandoval   
  RA  SATS NOT CHECKED   
(X) * Mental status improved or at baseline Activity ( ) * Increased activity Routes   
(X) Oral hydration, medications 1/12/2021 15:46:32 EST by Venkat Sandoval   
  DECADRON 6MG PO Q 12 HRS,  LISINOPRIL 10MG PO Q HS, HUMALOG 15U SC TID AC MEALS, SSI SC AC & HS (4U X2, 6U X2), LANTUS 40J SC Q HS, PEPCID 20MG PO BID, LOVENOX 10MG SC Q 12 HRS, LIPITOR 40MG PO Q HS, ASPIRIN 81MG PO QD,   
(X) Usual diet 1/12/2021 15:46:32 EST by Venkat Sandoval   
  DIABETIC CONSIST  CARB Interventions (X) Pulse oximetry (X) Possible oxygen 1/12/2021 15:46:32 EST by Venkat Sandoval   
  BIPAP & 60L HEATED HFNC   
* Milestone Additional Notes Daily Progress Note: 1/10/2021 (Pulmonary Team Day #2 Eval)  
   
No significant improvements or escalation of symptoms. SPO2 hovering between 90-92% on 100%/60L AIRVO without any activity. Requiring every bit of 100%/60L to maintain saturations. Used BIPAP through the night. Two ensures yesterday but no food due to drop in oxygenation.  Patient has no complaints today and is very pleasant.   
   
 EXAM:  Constitution:  the patient is well developed and in no acute distress. Very pleasant. EENMT:  Sclera clear, pupils equal, oral mucosa moist  
Respiratory: Inspiratory / expiratory crackles half way up the posterior lungs Cardiovascular:  RRR without M,G,R  
Gastrointestinal: soft and non-tender; with positive bowel sounds. Musculoskeletal: warm without cyanosis. There is no lower extremity edema. Skin:  no jaundice or rashes, none wounds Neurologic: no gross neuro deficits     
Psychiatric:  alert and oriented x 3 WBC 20.4, NEUTS 91, FIBRINOGEN 757, , GLUC 281, BUN 64, CREAT 1.56, GFRNAA 47, Plan:  (Medical Decision Making)  
   
--Bipap in room if needed. Utilized last night. --Continue Airvo 60L/100% --Encourage side to side and consider pronation  
--Increase ensure to qid given he isn't able to tolerate food consumption  
--Continue decadron 6mg BID (currently on day #3) - COVID positive on 12/31/2020  
--GI/DVT prophylaxis Hospitalist Progress Note    
Did better on bipap overnight. Back on airvo and satting low-mid 90s at rest. Desats to 85 after a few minutes conversation. No fevers. pulm following. Plan:  
   
Acute respiratory failure with hypoxia 2/2 Covid-19 Pneumonia O2 sat at home 79% on RA and placed on 2L NC in ED. COVID POSITIVE on 12/30. CXR shows b/l atelectasis vs pneumonia. S/p plasma Dexamethasone S/p Remdesivir Proning as tolerated SSI while on steroids. Added basal and premeal.  
Oxygen supplementation--requiring every bit of 100% FiO2 right now. pulm following. On and off bipap, not interested in intubation. D dimer flat, mild elevation.  
   
Leukocytosis: most likely 2/2 steroids.   
   
CAD s/p CABG: Cont home ASA, statin, lisinopril metoprolol T2DM: hold home Actos. On SSI w/ POC BG qAC/HS. Added lantus. HTN: Cont home Lisinopril and metoprolol GERD: Pepcid BID Stage 3 CKD: Cr at baseline HLD: Cont home statin Vit D Def: Cont home supplements  
  
  
  
Pneumonia - Care Day 9 (1/9/2021) by Senait Barba 
 
  
Review Status Review Entered Completed 1/12/2021 11:58  
  
Criteria Review Care Day: 9 Care Date: 1/9/2021 Level of Care: Telemetry Guideline Day 2 Clinical Status   
(X) * No CO2 retention or acidosis   
(X) * No requirement for mechanical ventilation   
(X) * Hypotension absent 1/12/2021 11:58:40 EST by Sneait Barba   
  /76, P 91, R 22   
(X) * Afebrile or fever improved ( ) * No hypoxia on room air or oxygenation improved 1/12/2021 11:58:40 EST by Senait Barba   
  SEE REVIEW   
(X) * Mental status improved or at baseline Activity ( ) * Increased activity Routes   
(X) Oral hydration, medications   
(X) Usual diet Interventions (X) Pulse oximetry (X) Possible oxygen 1/12/2021 11:58:40 EST by Senait Barba   
  AIRVO 60L/100% as well as non-rebreather * Milestone Additional Notes Daily Progress Note: 1/9/2021 (Pulmonary Team Day #2 Eval) Denies shortness of breath at this point. Didn't sleep very well due to all the tubing/lines. Reports having urinated at least 3 times since the lasix at 1530 yesterday. No change in oxygen demands and does desat with extensive conversation. Currently on AIRVO 60L/100% as well as non-rebreather. Patient again stated he did not want to go to ICU. No use of Bipap within the past 24 hours. EXAM:  Constitution:  the patient is well developed and in no acute distress. Very pleasant. EENMT:  Sclera clear, pupils equal, oral mucosa moist  
Respiratory: Inspiratory / expiratory crackles half way up the posterior lungs Cardiovascular:  RRR without M,G,R  
Gastrointestinal: soft and non-tender; with positive bowel sounds. Musculoskeletal: warm without cyanosis. There is no lower extremity edema. Skin:  no jaundice or rashes, none wounds Neurologic: no gross neuro deficits     
 Psychiatric:  alert and oriented x 3 FIBRINOGEN 796, D DIMER 0.73, POC GLUC 146-346 EKG: Normal sinus rhythm  Nonspecific T wave abnormality  
  
--Continue encouraging nutrition with ensure as the patient reports some hunger. --No significant improvement with low dose lasix. SPO2 at 94% when supine. Desats with conversation but does rebound. Placed on left sidelying with SPO2 stabilized at 92-93%. --Bipap in room is needed. No obvious signs of fatigue at this point  
--Continue decadron 6mg BID (currently on day #2) - COVID positive on 12/31/2020  
--GI/DVT prophylaxis   
--Critical condition but stable ALBUTEROL INH Q 6 HRS PRN X1, ASPIRIN 81MG PO QD, LIPITOR 40MG PO Q HS,  PEPCID 20MG PO BID,  LANTUS 40U SC Q HS, SSI SC AC & HS (8U X1, 10U X1, 4U X 1), HUMALOG 15U SC TID AC MEALS, LISINOPRIL 10MG PO Q HS,  DECADRON 6MG PO Q 12 HRS,   
  
  
Pneumonia - Care Day 8 (1/8/2021) by Venkat Sandoval 
 
  
Review Status Review Entered Completed 1/8/2021 11:05  
  
Criteria Review Care Day: 8 Care Date: 1/8/2021 Level of Care: Telemetry Guideline Day 2 Clinical Status   
(X) * No CO2 retention or acidosis   
(X) * No requirement for mechanical ventilation   
(X) * Hypotension absent   
(X) * Afebrile or fever improved 1/8/2021 11:05:01 EST by Venkat Sandoval   
  98.2   
( ) * No hypoxia on room air or oxygenation improved 1/8/2021 11:05:01 EST by Venkat Sandoval   
  02 SAT 87-98% 60L HEATED HFNC 
RA SATS NOT CHECKED   
(X) * Mental status improved or at baseline Activity ( ) * Increased activity 1/8/2021 11:05:01 EST by [address] PT per RN due to respiratory status. Will check back on patient at a later date/time if schedule permits Routes   
(X) Oral hydration, medications 1/8/2021 11:05:01 EST by Venkat Sandoval   
  SSI SC  AC & HS (11U X1),  ECADRON 6MG PO Q 1 2HRS,  OTHER SCHED MEDS ARE SAME   
(X) Usual diet Interventions (X) Pulse oximetry (X) Possible oxygen 1/8/2021 11:05:01 EST by Alexander Romano   
  02 60L HEATED HFNC   
* Milestone Additional Notes Hospitalist Progress Note    
  
Overnight increased airvo and needed to go on bipap for a few hours. He is back on airvo/nrb combo 100% FiO2 and desats with any movement or even taking mask off for a bit of food. Rapid breathing. Doesn't feel short of breath. The patient and his son via phone are updated on condition and options. They agree with bipap but affirm that he would not want vent or resuscitation. EXAM:  General:          No acute distress Lungs:             Coarse lung sounds Bilaterally. Heart:              Regular rate and rhythm,  No murmur, rub, or gallop Abdomen:        Soft, Non distended, Non tender, Positive bowel sounds Extremities:     No cyanosis, clubbing or edema Neurologic:       No focal deficits BP   108/65, P 63, R 19  
PT 15.3, FIBRINOGEN 716, D DIMER 0.76 Plan:  
   
Acute respiratory failure with hypoxia 2/2 Covid-19 Pneumonia O2 sat at home 79% on RA and placed on 2L NC in ED. COVID POSITIVE on 12/30. CXR shows b/l atelectasis vs pneumonia. S/p plasma Dexamethasone for 10 days (1/1-1/10) S/p Remdesivir Proning as tolerated SSI while on steroids. Added basal and premeal.  
Oxygen supplementation--requiring every bit of 100% FiO2 right now. pulm consult. Would want bipap, not interested in intubation. D dimer flat, mild elevation.  
   
Leukocytosis: most likely 2/2 steroids.   
   
CAD s/p CABG: Cont home ASA, statin, lisinopril metoprolol T2DM: hold home Actos. On SSI w/ POC BG qAC/HS. Added lantus. HTN: Cont home Lisinopril and metoprolol GERD: Pepcid BID Stage 3 CKD: Cr at baseline HLD: Cont home statin Vit D Def: Cont home supplements  
   
DVT PPx: Lovenox SQ Code Status: DNR  
  
  
  
  
Pneumonia - Care Day 7 (1/7/2021) by Alexander Romano 
 
  
Review Status Review Entered Completed 1/8/2021 10:57  
  
Criteria Review Care Day: 7 Care Date: 1/7/2021 Level of Care: Telemetry Guideline Day 2 Clinical Status   
(X) * No CO2 retention or acidosis 1/8/2021 10:57:49 EST by Sai Wesley   
  C02 21   
(X) * No requirement for mechanical ventilation   
(X) * Hypotension absent   
(X) * Afebrile or fever improved 1/8/2021 10:57:49 EST by Sai Wesley   
  T 98.4   
( ) * No hypoxia on room air or oxygenation improved 1/8/2021 10:57:49 EST by Sai Wesley   
  02 SAT 86-91% 60L HEATED HFNC   
(X) * Mental status improved or at baseline 1/8/2021 10:57:49 EST by Sai Wesley   
  Neurologic:       No focal deficits Activity   
(X) * Increased activity 1/8/2021 10:57:49 EST by Sai Wesley   
  PT NOTE: making steady progress towards PT goals as noted by increased gait distance and activity tolerance. Will continue PT efforts. Routes   
(X) Oral hydration, medications 1/8/2021 10:57:49 EST by Sai Wesley   
  SSI  SC AC & HS (8U X 2, 2U X1, 6U X1), OTHER SCHED MEDS ARE SAME   
(X) Usual diet Interventions (X) Pulse oximetry (X) Possible oxygen 1/8/2021 10:57:49 EST by Sai Wesley   
  02 60L HEATED HFNC   
* Milestone Additional Notes Hospitalist Progress Note    
Remains on 70% airvo Low energy, no dyspnea, rare cough.    
No cp, no n/v. EXAM: General:          No acute distress Lungs:             Coarse lung sounds Bilaterally. Heart:              Regular rate and rhythm,  No murmur, rub, or gallop Abdomen:        Soft, Non distended, Non tender, Positive bowel sounds Extremities:     No cyanosis, clubbing or edema Neurologic:       No focal deficits BP  112/70, P 63, R 20 WBC 11.5, RBC 3.56, HGB 11.3, HCT 33.7, PT 15.7, FIBRINOGEN 652, D DIMER 0.74, GLUC 280, BUN 35, TP 6.2, ALB 2.4, GLOB 3.8,  POC BLOOD GAS:  PC02 26.7, P02 64, HC03 18.1, s02 93 ON 60l heated NC  
  
 CT CHEST: IV infiltrated during scan, RN aware, may need PICC team - [id number] -emw Plan:  
   
Acute respiratory failure with hypoxia 2/2 Covid-19 Pneumonia O2 sat at home 79% on RA and placed on 2L NC in ED. COVID POSITIVE on 12/30. CXR shows b/l atelectasis vs pneumonia. S/p plasma Dexamethasone for 10 days (1/1-1/10) Remdesivir for 5 days Prone as tolerated SSI while on steroids. Added basal and premeal.  
Oxygen supplementation--now on airvo, wean as tolerated.  
   
Leukocytosis: most likely 2/2 steroids.   
   
Elevated LFT: AST>ALT (wnl). Most likely secondary to viral infection. Improving. Hold Remdesivir if ALT>250.  
   
CAD s/p CABG: Cont home ASA, statin, lisinopril metoprolol T2DM: hold home Actos. On SSI w/ POC BG qAC/HS. Added lantus. HTN: Cont home Lisinopril and metoprolol GERD: Pepcid BID Stage 3 CKD: Cr at baseline HLD: Cont home statin Vit D Def: Cont home supplements  
   
DVT PPx: Lovenox SQ Code Status: FULL  
   
Anticipated discharge: Pending clinical course as still on high O2. PT/OT ordered. CONTINUOUS PULSE OX, DROPLET PLUS ISOLATION  
  
  
Pneumonia - Care Day 6 (1/6/2021) by Bruce Chapa 
 
  
Review Status Review Entered Completed 1/7/2021 14:50  
  
Criteria Review Care Day: 6 Care Date: 1/6/2021 Level of Care: Telemetry Guideline Day 2 Clinical Status ( ) * No CO2 retention or acidosis 1/7/2021 14:50:02 EST by Bruce Chapa   
  C02 19   
(X) * No requirement for mechanical ventilation   
(X) * Hypotension absent 1/7/2021 14:50:02 EST by Bruce Chapa   
  /70, P 57-80   
(X) * Afebrile or fever improved 1/7/2021 14:50:02 EST by Bruce Chapa   
  T 98.1   
( ) * No hypoxia on room air or oxygenation improved 1/7/2021 14:50:02 EST by Bruce Chapa   
  RA SATS NOT CHECKED 
 
02 SATS 91-98% 50L HEATED HFNC, 90-92% 60L HEATED HFNC   
(X) * Mental status improved or at baseline Activity (X) * Increased activity 1/7/2021 14:50:02 EST by Rosa Bull   
  PT NOTE: Mr. Alex Gifford is making slow, steady progress towards PT goals as noted by increased gait distance and activity tolerance. Will continue PT efforts. Routes   
(X) Oral hydration, medications 1/7/2021 14:50:02 EST by Rosa Bull   
  SEE REVIEW   
(X) Usual diet 1/7/2021 14:50:02 EST by Rosa Bull   
  DIABETIC CONSIST CARB Interventions (X) Pulse oximetry 1/7/2021 14:50:02 EST by Rosa Bull   
  SPOT CHECK OXIMETRY PRN   
(X) Possible oxygen 1/7/2021 14:50:02 EST by Rosa Bull   
  02 SATS 91-98% 50L HEATED HFNC, 90-92% 60L HEATED HFNC   
* Milestone Additional Notes Hospitalist Progress Note  
   
1/6: changed over to Airvo overnight, on 70% FiO2. Felt poorly overnight, has been feeling much better since them.    
No cp, no n/v. EXAM:  General:          No acute distress Lungs:             Coarse lung sounds Bilaterally. Heart:              Regular rate and rhythm,  No murmur, rub, or gallop Abdomen:        Soft, Non distended, Non tender, Positive bowel sounds Extremities:     No cyanosis, clubbing or edema Neurologic:       No focal deficits WBC 14.6, RBC 4.09, HGB 13.1, HCT 38.7, FIBRINOGEN 644, D DIMER 0.62, C02 19, GLUC 218, BUN 37, GFRNAA 56, ALB 2.9, GLOB 4.2, , Plan:  
   
Acute respiratory failure with hypoxia 2/2 Covid-19 Pneumonia O2 sat at home 79% on RA and placed on 2L NC in ED. COVID POSITIVE on 12/30. CXR shows b/l atelectasis vs pneumonia. S/p plasma Dexamethasone for 10 days (1/1-1/10) Remdesivir for 5 days Prone as tolerated SSI while on steroids. Added basal and premeal.  
Oxygen supplementation--now on airvo, wean as tolerated.  
   
Leukocytosis: most likely 2/2 steroids.   
   
Elevated LFT: AST>ALT (wnl). Most likely secondary to viral infection. Improving.  Hold Remdesivir if ALT>250.  
   
 CAD s/p CABG: Cont home ASA, statin, lisinopril metoprolol T2DM: hold home Actos. On SSI w/ POC BG qAC/HS. Added lantus. HTN: Cont home Lisinopril and metoprolol GERD: Pepcid BID Stage 3 CKD: Cr at baseline HLD: Cont home statin Vit D Def: Cont home supplements  
   
DVT PPx: Lovenox SQ Code Status: FULL  
   
Anticipated discharge: Pending clinical course as still on high O2. PT/OT ordered.  
   
REMDESEVIR 100 MG IV Q 2 4HRS, VIT C 1000MG PO QD, ASPIRIN 81MG PO QD, LIPITOR 40MG PO Q HS, VIT D3 1 TAB PO QD, DECADRON 6MG PO QD,  LOVENOX 40MG SC Q 12 HRS, PEPCID 20MG PO BID,  LANTUS 30U SC Q HS, SSI SC AC & HS (4U X3), HUMALOG 11U SC TID AC MEALS,  LISINOPRIL 10MG PO Q HS, LOPRESSOR 25MG PO Q HS, THERA M W/IRON 1 TAB PO QD, ZINC SULFATE 220 MG PO QD, DROPLET PLUS ISOLATION  
  
  
Pneumonia - Care Day 5 (1/5/2021) by [facility] 
 
  
Review Status Review Entered Completed 1/6/2021 11:13  
  
Criteria Review Care Day: 5 Care Date: 1/5/2021 Level of Care: Telemetry Guideline Day 2 Clinical Status   
(X) * No CO2 retention or acidosis 1/6/2021 11:13:05 EST by [facility]   
  C02 22   
(X) * No requirement for mechanical ventilation   
(X) * Hypotension absent 1/6/2021 11:13:05 EST by [facility]   
  /71, P 54   
(X) * Afebrile or fever improved 1/6/2021 11:13:05 EST by [facility]   
  T 98.1   
( ) * No hypoxia on room air or oxygenation improved 1/6/2021 11:13:05 EST by [facility]   
  02 SAT 90-94% 10L NC   
(X) * Mental status improved or at baseline Activity   
(X) * Increased activity Routes   
(X) Oral hydration, medications 1/6/2021 11:13:05 EST by [facility]   
  LANTUS 25U SC Q HS, SSI SC AC & HS (4U X2, 8U X1, 6U X1),  SAME SCHED MEDS Interventions (X) Pulse oximetry (X) Possible oxygen * Milestone Additional Notes Hospitalist Progress Note    
  
 1/5: sitting up in chair. Still remains on 10L but has been feeling less short of breath and able to move in room better. No peripheral edema, chest pain, abdominal pain, N/V, diarrhea or constipation.   
   
EXAM: General:          No acute distress Lungs:             Coarse lung sounds Bilaterally. Heart:              Regular rate and rhythm,  No murmur, rub, or gallop Abdomen:        Soft, Non distended, Non tender, Positive bowel sounds Extremities:     No cyanosis, clubbing or edema Neurologic:       No focal deficits Plan:  
   
Acute respiratory failure with hypoxia 2/2 Covid-19 Pneumonia O2 sat at home 79% on RA and placed on 2L NC in ED. COVID POSITIVE on 12/30. CXR shows b/l atelectasis vs pneumonia. S/p plasma Dexamethasone for 10 days (1/1-1/10) Remdesivir for 5 days Prone as tolerated SSI while on steroids. Added basal and premeal.  
Oxygen supplementation--10LNC. Wean O2 as tolerated.  
   
Leukocytosis: most likely 2/2 steroids.   
   
Elevated LFT: AST>ALT (wnl). Most likely secondary to viral infection. Improving. Hold Remdesivir if ALT>250.  
   
CAD s/p CABG: Cont home ASA, statin, lisinopril metoprolol T2DM: hold home Actos. On SSI w/ POC BG qAC/HS. Added lantus. HTN: Cont home Lisinopril and metoprolol GERD: Pepcid BID Stage 3 CKD: Cr at baseline HLD: Cont home statin Vit D Def: Cont home supplements  
   
DVT PPx: Lovenox SQ Code Status: FULL PHYSICAL THERAPY: Daily Note and PM Treatment Day # 3 ASSESSMENT:  
Mr. Angel Mullins presents in bedside chair again today, with increased activity tolerance today, including increased gait to 3 x 30ft. At end of session, he is up in bedside chair with all needs within reach.   
  
  
CONTINUES ON DROPLET PLUS ISOLATION

## 2021-01-16 LAB
BACTERIA SPEC CULT: ABNORMAL
BACTERIA SPEC CULT: ABNORMAL
GRAM STN SPEC: ABNORMAL
SERVICE CMNT-IMP: ABNORMAL

## 2021-01-17 LAB
BACTERIA SPEC CULT: NORMAL
BACTERIA SPEC CULT: NORMAL
SERVICE CMNT-IMP: NORMAL
SERVICE CMNT-IMP: NORMAL
